# Patient Record
Sex: FEMALE | Race: WHITE | NOT HISPANIC OR LATINO
[De-identification: names, ages, dates, MRNs, and addresses within clinical notes are randomized per-mention and may not be internally consistent; named-entity substitution may affect disease eponyms.]

---

## 2018-02-24 ENCOUNTER — TRANSCRIPTION ENCOUNTER (OUTPATIENT)
Age: 58
End: 2018-02-24

## 2018-03-25 ENCOUNTER — TRANSCRIPTION ENCOUNTER (OUTPATIENT)
Age: 58
End: 2018-03-25

## 2018-04-19 ENCOUNTER — NON-APPOINTMENT (OUTPATIENT)
Age: 58
End: 2018-04-19

## 2018-04-19 ENCOUNTER — APPOINTMENT (OUTPATIENT)
Dept: INTERNAL MEDICINE | Facility: CLINIC | Age: 58
End: 2018-04-19
Payer: COMMERCIAL

## 2018-04-19 VITALS
RESPIRATION RATE: 16 BRPM | DIASTOLIC BLOOD PRESSURE: 88 MMHG | HEIGHT: 59 IN | OXYGEN SATURATION: 97 % | HEART RATE: 84 BPM | SYSTOLIC BLOOD PRESSURE: 120 MMHG | BODY MASS INDEX: 30.04 KG/M2 | WEIGHT: 149 LBS | TEMPERATURE: 98.4 F

## 2018-04-19 DIAGNOSIS — I72.8 ANEURYSM OF OTHER SPECIFIED ARTERIES: ICD-10-CM

## 2018-04-19 DIAGNOSIS — Z82.49 FAMILY HISTORY OF ISCHEMIC HEART DISEASE AND OTHER DISEASES OF THE CIRCULATORY SYSTEM: ICD-10-CM

## 2018-04-19 DIAGNOSIS — K76.0 FATTY (CHANGE OF) LIVER, NOT ELSEWHERE CLASSIFIED: ICD-10-CM

## 2018-04-19 DIAGNOSIS — Z78.9 OTHER SPECIFIED HEALTH STATUS: ICD-10-CM

## 2018-04-19 PROCEDURE — 99205 OFFICE O/P NEW HI 60 MIN: CPT | Mod: 25

## 2018-04-19 PROCEDURE — 93000 ELECTROCARDIOGRAM COMPLETE: CPT

## 2018-05-22 ENCOUNTER — LABORATORY RESULT (OUTPATIENT)
Age: 58
End: 2018-05-22

## 2018-05-25 LAB
APPEARANCE: CLEAR
BACTERIA UR CULT: NORMAL
BACTERIA: NEGATIVE
BILIRUBIN URINE: NEGATIVE
BLOOD URINE: NEGATIVE
COLOR: YELLOW
GLUCOSE QUALITATIVE U: NEGATIVE MG/DL
HYALINE CASTS: 4 /LPF
KETONES URINE: NEGATIVE
LEUKOCYTE ESTERASE URINE: ABNORMAL
MICROSCOPIC-UA: NORMAL
NITRITE URINE: NEGATIVE
PH URINE: 5
PROTEIN URINE: NEGATIVE MG/DL
RED BLOOD CELLS URINE: 2 /HPF
SPECIFIC GRAVITY URINE: 1.03
SQUAMOUS EPITHELIAL CELLS: 3 /HPF
UROBILINOGEN URINE: NEGATIVE MG/DL
WHITE BLOOD CELLS URINE: 12 /HPF

## 2018-05-31 ENCOUNTER — NON-APPOINTMENT (OUTPATIENT)
Age: 58
End: 2018-05-31

## 2018-05-31 ENCOUNTER — APPOINTMENT (OUTPATIENT)
Dept: INTERNAL MEDICINE | Facility: CLINIC | Age: 58
End: 2018-05-31
Payer: COMMERCIAL

## 2018-05-31 VITALS
RESPIRATION RATE: 16 BRPM | SYSTOLIC BLOOD PRESSURE: 130 MMHG | TEMPERATURE: 98.2 F | WEIGHT: 149 LBS | DIASTOLIC BLOOD PRESSURE: 70 MMHG | HEIGHT: 59 IN | OXYGEN SATURATION: 98 % | BODY MASS INDEX: 30.04 KG/M2 | HEART RATE: 62 BPM

## 2018-05-31 DIAGNOSIS — K21.9 GASTRO-ESOPHAGEAL REFLUX DISEASE W/OUT ESOPHAGITIS: ICD-10-CM

## 2018-05-31 PROCEDURE — 94060 EVALUATION OF WHEEZING: CPT

## 2018-05-31 PROCEDURE — 99214 OFFICE O/P EST MOD 30 MIN: CPT | Mod: 25

## 2018-05-31 PROCEDURE — 94727 GAS DIL/WSHOT DETER LNG VOL: CPT

## 2018-05-31 PROCEDURE — 94729 DIFFUSING CAPACITY: CPT

## 2018-05-31 NOTE — PHYSICAL EXAM
[No Acute Distress] : no acute distress [Well Developed] : well developed [Well-Appearing] : well-appearing [Normal Voice/Communication] : normal voice/communication [Normal Sclera/Conjunctiva] : normal sclera/conjunctiva [PERRL] : pupils equal round and reactive to light [EOMI] : extraocular movements intact [Normal Outer Ear/Nose] : the outer ears and nose were normal in appearance [Normal Oropharynx] : the oropharynx was normal [Normal Nasal Mucosa] : the nasal mucosa was normal [No JVD] : no jugular venous distention [Supple] : supple [No Lymphadenopathy] : no lymphadenopathy [Thyroid Normal, No Nodules] : the thyroid was normal and there were no nodules present [No Respiratory Distress] : no respiratory distress  [Clear to Auscultation] : lungs were clear to auscultation bilaterally [No Accessory Muscle Use] : no accessory muscle use [Normal Rate] : normal rate  [Regular Rhythm] : with a regular rhythm [Normal S1, S2] : normal S1 and S2 [No Murmur] : no murmur heard [No Varicosities] : no varicosities [No Edema] : there was no peripheral edema [No Extremity Clubbing/Cyanosis] : no extremity clubbing/cyanosis [Soft] : abdomen soft [Normal Supraclavicular Nodes] : no supraclavicular lymphadenopathy [Normal Posterior Cervical Nodes] : no posterior cervical lymphadenopathy [Normal Anterior Cervical Nodes] : no anterior cervical lymphadenopathy [No CVA Tenderness] : no CVA  tenderness [No Spinal Tenderness] : no spinal tenderness [Kyphosis] : no kyphosis [No Joint Swelling] : no joint swelling [Grossly Normal Strength/Tone] : grossly normal strength/tone [No Rash] : no rash [No Skin Lesions] : no skin lesions [Acne] : no acne [Normal Gait] : normal gait [Coordination Grossly Intact] : coordination grossly intact [No Focal Deficits] : no focal deficits [Speech Grossly Normal] : speech grossly normal [Memory Grossly Normal] : memory grossly normal [Normal Affect] : the affect was normal [Alert and Oriented x3] : oriented to person, place, and time [Normal Mood] : the mood was normal [Normal Insight/Judgement] : insight and judgment were intact [de-identified] : overweight. [de-identified] : no lip lesions [de-identified] : no wheezing or crackles. [de-identified] : round

## 2018-05-31 NOTE — DATA REVIEWED
[FreeTextEntry1] : PFT is performed today (see pre albert and post with other PFT testing). Flow rates are normal with moderate airway reactivity. ERV is reduced and RV is slightly elevated. Raw diffusion is reduced at 78% predicted but corrects to 115% based on alveolar volume.\par \par 5-22-18 labs reviewed with the patient today.\par

## 2018-05-31 NOTE — REVIEW OF SYSTEMS
[Fever] : no fever [Chills] : no chills [Fatigue] : no fatigue [Night Sweats] : no night sweats [Recent Change In Weight] : ~T recent weight change [Pain] : no pain [Vision Problems] : no vision problems [Hearing Loss] : no hearing loss [Nosebleed] : no nosebleeds [Hoarseness] : no hoarseness [Nasal Discharge] : no nasal discharge [Sore Throat] : no sore throat [Postnasal Drip] : no postnasal drip [Chest Pain] : no chest pain [Palpitations] : no palpitations [Lower Ext Edema] : no lower extremity edema [Orthopnea] : no orthopnea [Paroysmal Nocturnal Dyspnea] : no paroysmal nocturnal dyspnea [Wheezing] : no wheezing [Cough] : cough [Abdominal Pain] : no abdominal pain [Nausea] : no nausea [Constipation] : no constipation [Diarrhea] : diarrhea [Heartburn] : no heartburn [Melena] : no melena [Dysuria] : no dysuria [Hematuria] : no hematuria [Joint Pain] : no joint pain [Joint Stiffness] : no joint stiffness [Joint Swelling] : no joint swelling [Muscle Pain] : no muscle pain [Back Pain] : no back pain [Itching] : Itching [Mole Changes] : no mole changes [Skin Rash] : skin rash [Headache] : no headache [Dizziness] : no dizziness [Fainting] : no fainting [Confusion] : no confusion [Memory Loss] : no memory loss [Unsteady Walking] : no ataxia [Insomnia] : no insomnia [Anxiety] : anxiety [Depression] : no depression [Easy Bruising] : no easy bruising [Swollen Glands] : no swollen glands [Negative] : Heme/Lymph

## 2018-05-31 NOTE — PLAN
[FreeTextEntry1] : She will not return to smoking.\par Strict low fat/chol/Na diet.\par Exercise as tolerated.\par Continue weight loss.\par Continue current medications.\par She will return to Dr Hector soon.\par No acute treatment for abn UA buit she has been referred to Dr Gardner for GYN exam soon.\par Dr Pack for colonoscopy.\par Eye exam is due soon.\par She will continue yearly dental exam.\par F/U 5-6 mos as sched. or sooner PRN.\par

## 2018-05-31 NOTE — HISTORY OF PRESENT ILLNESS
[FreeTextEntry1] : HTN, urticaria and mild COPD. [de-identified] : The patient has been monitoring her BP at home and reports SBP below 140 and DBP below 80 or daily basis. She is compliant with medications and dietary restrictions. "I have lost 5 pounds so far." She denies chest pain, palpitation, LH, dizziness, edema or PND. \par \par She admits to SUTTON with moderate exertion and there is mild dry cough present during the day w/o wheeze, sputum production or hemoptysis. Rare episodes of urticaria continue with upper lip swelling, mostly in early AM, that resolves within a few hours after Zyrtec OTC. She is due f/u with Dr Hector.\par \par UA has been abn x 2 with contaminated culture. She denies sxs of UTI, hematuria, abd pain or flank pain. She is overdue GYN, GI and Opthal visits.

## 2018-06-21 ENCOUNTER — TRANSCRIPTION ENCOUNTER (OUTPATIENT)
Age: 58
End: 2018-06-21

## 2018-07-26 ENCOUNTER — APPOINTMENT (OUTPATIENT)
Dept: OBGYN | Facility: CLINIC | Age: 58
End: 2018-07-26
Payer: COMMERCIAL

## 2018-07-26 VITALS
BODY MASS INDEX: 30.44 KG/M2 | WEIGHT: 151 LBS | HEIGHT: 59 IN | DIASTOLIC BLOOD PRESSURE: 80 MMHG | SYSTOLIC BLOOD PRESSURE: 110 MMHG

## 2018-07-26 DIAGNOSIS — Z78.9 OTHER SPECIFIED HEALTH STATUS: ICD-10-CM

## 2018-07-26 PROCEDURE — 99386 PREV VISIT NEW AGE 40-64: CPT

## 2018-07-26 RX ORDER — CETIRIZINE HYDROCHLORIDE 10 MG/1
10 TABLET, FILM COATED ORAL DAILY
Qty: 30 | Refills: 5 | Status: DISCONTINUED | COMMUNITY
Start: 2018-04-19 | End: 2018-07-26

## 2018-07-26 RX ORDER — RANITIDINE 150 MG/1
150 TABLET ORAL TWICE DAILY
Qty: 180 | Refills: 3 | Status: DISCONTINUED | COMMUNITY
End: 2018-07-26

## 2018-07-30 LAB — HPV HIGH+LOW RISK DNA PNL CVX: NOT DETECTED

## 2018-07-31 LAB — CYTOLOGY CVX/VAG DOC THIN PREP: NORMAL

## 2018-09-13 LAB
24R-OH-CALCIDIOL SERPL-MCNC: 53.1 PG/ML
ALBUMIN SERPL ELPH-MCNC: 4.4 G/DL
ALP BLD-CCNC: 50 U/L
ALT SERPL-CCNC: 17 U/L
ANION GAP SERPL CALC-SCNC: 15 MMOL/L
APPEARANCE: ABNORMAL
AST SERPL-CCNC: 18 U/L
BACTERIA: NEGATIVE
BASOPHILS # BLD AUTO: 0.01 K/UL
BASOPHILS NFR BLD AUTO: 0.2 %
BILIRUB SERPL-MCNC: 0.6 MG/DL
BILIRUBIN URINE: NEGATIVE
BLOOD URINE: ABNORMAL
BUN SERPL-MCNC: 17 MG/DL
CALCIUM SERPL-MCNC: 9.5 MG/DL
CHLORIDE SERPL-SCNC: 105 MMOL/L
CHOLEST SERPL-MCNC: 157 MG/DL
CHOLEST/HDLC SERPL: 3.5 RATIO
CO2 SERPL-SCNC: 23 MMOL/L
COLOR: YELLOW
CREAT SERPL-MCNC: 0.86 MG/DL
EOSINOPHIL # BLD AUTO: 0.17 K/UL
EOSINOPHIL NFR BLD AUTO: 2.8 %
ESTIMATED AVERAGE GLUCOSE: 108 MG/DL
GLUCOSE QUALITATIVE U: NEGATIVE MG/DL
GLUCOSE SERPL-MCNC: 96 MG/DL
HBA1C MFR BLD HPLC: 5.4 %
HCT VFR BLD CALC: 40.4 %
HDLC SERPL-MCNC: 45 MG/DL
HGB BLD-MCNC: 13.6 G/DL
HYALINE CASTS: 0 /LPF
IMM GRANULOCYTES NFR BLD AUTO: 0.2 %
KETONES URINE: NEGATIVE
LDLC SERPL CALC-MCNC: 68 MG/DL
LEUKOCYTE ESTERASE URINE: ABNORMAL
LYMPHOCYTES # BLD AUTO: 1.67 K/UL
LYMPHOCYTES NFR BLD AUTO: 27.6 %
MAN DIFF?: NORMAL
MCHC RBC-ENTMCNC: 28.2 PG
MCHC RBC-ENTMCNC: 33.7 GM/DL
MCV RBC AUTO: 83.8 FL
MICROSCOPIC-UA: NORMAL
MONOCYTES # BLD AUTO: 0.46 K/UL
MONOCYTES NFR BLD AUTO: 7.6 %
NEUTROPHILS # BLD AUTO: 3.72 K/UL
NEUTROPHILS NFR BLD AUTO: 61.6 %
NITRITE URINE: NEGATIVE
PH URINE: 5
PLATELET # BLD AUTO: 218 K/UL
POTASSIUM SERPL-SCNC: 4.1 MMOL/L
PROT SERPL-MCNC: 7.4 G/DL
PROTEIN URINE: ABNORMAL MG/DL
RBC # BLD: 4.82 M/UL
RBC # FLD: 13.8 %
RED BLOOD CELLS URINE: 2 /HPF
SODIUM SERPL-SCNC: 143 MMOL/L
SPECIFIC GRAVITY URINE: 1.02
SQUAMOUS EPITHELIAL CELLS: 2 /HPF
T3FREE SERPL-MCNC: 2.88 PG/ML
T3RU NFR SERPL: 1.07 INDEX
T4 FREE SERPL-MCNC: 1.3 NG/DL
T4 SERPL-MCNC: 7.7 UG/DL
TRIGL SERPL-MCNC: 218 MG/DL
TSH SERPL-ACNC: 3.74 UIU/ML
UROBILINOGEN URINE: NEGATIVE MG/DL
WBC # FLD AUTO: 6.04 K/UL
WHITE BLOOD CELLS URINE: 201 /HPF

## 2018-10-16 ENCOUNTER — RESULT REVIEW (OUTPATIENT)
Age: 58
End: 2018-10-16

## 2019-04-23 ENCOUNTER — APPOINTMENT (OUTPATIENT)
Dept: INTERNAL MEDICINE | Facility: CLINIC | Age: 59
End: 2019-04-23
Payer: COMMERCIAL

## 2019-04-23 ENCOUNTER — MEDICATION RENEWAL (OUTPATIENT)
Age: 59
End: 2019-04-23

## 2019-04-23 VITALS
SYSTOLIC BLOOD PRESSURE: 134 MMHG | HEART RATE: 62 BPM | RESPIRATION RATE: 16 BRPM | BODY MASS INDEX: 31.45 KG/M2 | DIASTOLIC BLOOD PRESSURE: 72 MMHG | HEIGHT: 59 IN | WEIGHT: 156 LBS | OXYGEN SATURATION: 99 % | TEMPERATURE: 97.7 F

## 2019-04-23 PROCEDURE — 99396 PREV VISIT EST AGE 40-64: CPT

## 2019-04-23 NOTE — PHYSICAL EXAM
[Well Nourished] : well nourished [Well Developed] : well developed [PERRL] : pupils equal round and reactive to light [Normal Oropharynx] : the oropharynx was normal [No JVD] : no jugular venous distention [Supple] : supple [No Lymphadenopathy] : no lymphadenopathy [No Respiratory Distress] : no respiratory distress  [Clear to Auscultation] : lungs were clear to auscultation bilaterally [No Accessory Muscle Use] : no accessory muscle use [Regular Rhythm] : with a regular rhythm [No Edema] : there was no peripheral edema [No Extremity Clubbing/Cyanosis] : no extremity clubbing/cyanosis [Soft] : abdomen soft [Non Tender] : non-tender [Normal Supraclavicular Nodes] : no supraclavicular lymphadenopathy [Normal Posterior Cervical Nodes] : no posterior cervical lymphadenopathy [Normal Anterior Cervical Nodes] : no anterior cervical lymphadenopathy [No CVA Tenderness] : no CVA  tenderness [No Joint Swelling] : no joint swelling [Grossly Normal Strength/Tone] : grossly normal strength/tone [No Rash] : no rash [Normal Gait] : normal gait [Coordination Grossly Intact] : coordination grossly intact [Normal Affect] : the affect was normal [Normal Insight/Judgement] : insight and judgment were intact [de-identified] : Carotids are 1-2+ bilaterally

## 2019-04-23 NOTE — PLAN
[FreeTextEntry1] : 1. Continue with medication as outlined above.\par \par 2. The patient is due for her followup colonoscopy this year.\par \par 3. Routine blood work to be performed at this time.\par \par 4. Routine GYN followup\par \par 5. The patient will return for the new shingles vaccine after determining its coverage.\par \par 6. Followup in 6 months with full pulmonary function testing, and a Pneumovax 23. Flu shot will be offered as well.

## 2019-04-23 NOTE — HISTORY OF PRESENT ILLNESS
[de-identified] : The patient comes in today for a wellness evaluation.\par \par Overall, the patient states that she is stable at this time. She had previously been treated for chronic urticaria with Zyrtec and ranitidine. She notes that the symptoms eventually resolved. She discontinued these 2 medications on around approximately 6 months ago. She has not had any flareups of the urticaria. She still carries an EpiPen with her.\par \par The patient states that she is compliant with her other medications including fenofibrate and metoprolol. She still takes Paxil for depression and anxiety. She feels fairly well overall at this time. She occasionally exercises approximately 3 times a week but does not over exert herself. She has not been able to lose weight. She denies any change in bowel habits. She is due for GYN assessment. She now comes in for this assessment.

## 2019-04-23 NOTE — HEALTH RISK ASSESSMENT
[0] : 2) Feeling down, depressed, or hopeless: Not at all (0) [Sunscreen] : uses sunscreen [Seat Belt] :  uses seat belt [] : No [de-identified] : occasional [[id number]] : 0 [Smoke Detector] : no smoke detector [Carbon Monoxide Detector] : no carbon monoxide detector [Guns at Home] : no guns at home

## 2019-07-18 ENCOUNTER — APPOINTMENT (OUTPATIENT)
Dept: OBGYN | Facility: CLINIC | Age: 59
End: 2019-07-18
Payer: COMMERCIAL

## 2019-07-18 VITALS
BODY MASS INDEX: 31.51 KG/M2 | WEIGHT: 156.31 LBS | HEIGHT: 59 IN | SYSTOLIC BLOOD PRESSURE: 130 MMHG | DIASTOLIC BLOOD PRESSURE: 80 MMHG

## 2019-07-18 PROCEDURE — 99396 PREV VISIT EST AGE 40-64: CPT

## 2019-07-18 NOTE — HISTORY OF PRESENT ILLNESS
[Last Mammogram ___] : Last Mammogram was [unfilled] [Last Bone Density ___] : Last bone density studies [unfilled] [Last Colonoscopy ___] : Last colonoscopy [unfilled] [Good] : being in good health [Healthy Diet] : a healthy diet [Regular Exercise] : regular exercise [Last Pap ___] : Last cervical pap smear was [unfilled] [Postmenopausal] : is postmenopausal [Sexually Active] : is sexually active

## 2019-09-21 ENCOUNTER — LABORATORY RESULT (OUTPATIENT)
Age: 59
End: 2019-09-21

## 2019-09-25 LAB
25(OH)D3 SERPL-MCNC: 23.3 NG/ML
ALBUMIN SERPL ELPH-MCNC: 4.8 G/DL
ALP BLD-CCNC: 53 U/L
ALT SERPL-CCNC: 19 U/L
ANION GAP SERPL CALC-SCNC: 14 MMOL/L
APPEARANCE: ABNORMAL
AST SERPL-CCNC: 17 U/L
BACTERIA: NEGATIVE
BASOPHILS # BLD AUTO: 0.05 K/UL
BASOPHILS NFR BLD AUTO: 0.7 %
BILIRUB SERPL-MCNC: 0.5 MG/DL
BILIRUBIN URINE: NEGATIVE
BLOOD URINE: NEGATIVE
BUN SERPL-MCNC: 18 MG/DL
CALCIUM SERPL-MCNC: 9.8 MG/DL
CHLORIDE SERPL-SCNC: 106 MMOL/L
CHOLEST SERPL-MCNC: 161 MG/DL
CHOLEST/HDLC SERPL: 3.3 RATIO
CO2 SERPL-SCNC: 24 MMOL/L
COLOR: YELLOW
CREAT SERPL-MCNC: 0.79 MG/DL
EOSINOPHIL # BLD AUTO: 0.2 K/UL
EOSINOPHIL NFR BLD AUTO: 2.6 %
GLUCOSE QUALITATIVE U: NEGATIVE
GLUCOSE SERPL-MCNC: 97 MG/DL
HCT VFR BLD CALC: 45.4 %
HDLC SERPL-MCNC: 49 MG/DL
HGB BLD-MCNC: 14.8 G/DL
HYALINE CASTS: 0 /LPF
IMM GRANULOCYTES NFR BLD AUTO: 0.4 %
KETONES URINE: NEGATIVE
LDLC SERPL CALC-MCNC: 81 MG/DL
LEUKOCYTE ESTERASE URINE: ABNORMAL
LYMPHOCYTES # BLD AUTO: 2.01 K/UL
LYMPHOCYTES NFR BLD AUTO: 26.6 %
MAN DIFF?: NORMAL
MCHC RBC-ENTMCNC: 27.7 PG
MCHC RBC-ENTMCNC: 32.6 GM/DL
MCV RBC AUTO: 84.9 FL
MICROSCOPIC-UA: NORMAL
MONOCYTES # BLD AUTO: 0.49 K/UL
MONOCYTES NFR BLD AUTO: 6.5 %
NEUTROPHILS # BLD AUTO: 4.79 K/UL
NEUTROPHILS NFR BLD AUTO: 63.2 %
NITRITE URINE: NEGATIVE
PH URINE: 5.5
PLATELET # BLD AUTO: 224 K/UL
POTASSIUM SERPL-SCNC: 3.7 MMOL/L
PROT SERPL-MCNC: 7.2 G/DL
PROTEIN URINE: NORMAL
RBC # BLD: 5.35 M/UL
RBC # FLD: 13.3 %
RED BLOOD CELLS URINE: 6 /HPF
SODIUM SERPL-SCNC: 144 MMOL/L
SPECIFIC GRAVITY URINE: 1.03
SQUAMOUS EPITHELIAL CELLS: 4 /HPF
T3FREE SERPL-MCNC: 2.95 PG/ML
T3RU NFR SERPL: 1.1 TBI
T4 FREE SERPL-MCNC: 1.3 NG/DL
T4 SERPL-MCNC: 7.8 UG/DL
TRIGL SERPL-MCNC: 157 MG/DL
TSH SERPL-ACNC: 4.31 UIU/ML
URINE COMMENTS: NORMAL
UROBILINOGEN URINE: NORMAL
WBC # FLD AUTO: 7.57 K/UL
WHITE BLOOD CELLS URINE: 7 /HPF

## 2019-09-30 ENCOUNTER — APPOINTMENT (OUTPATIENT)
Dept: INTERNAL MEDICINE | Facility: CLINIC | Age: 59
End: 2019-09-30

## 2019-09-30 ENCOUNTER — APPOINTMENT (OUTPATIENT)
Dept: INTERNAL MEDICINE | Facility: CLINIC | Age: 59
End: 2019-09-30
Payer: COMMERCIAL

## 2019-09-30 ENCOUNTER — NON-APPOINTMENT (OUTPATIENT)
Age: 59
End: 2019-09-30

## 2019-09-30 VITALS
RESPIRATION RATE: 18 BRPM | HEART RATE: 71 BPM | DIASTOLIC BLOOD PRESSURE: 84 MMHG | SYSTOLIC BLOOD PRESSURE: 138 MMHG | BODY MASS INDEX: 31.45 KG/M2 | TEMPERATURE: 97.5 F | OXYGEN SATURATION: 96 % | HEIGHT: 59 IN | WEIGHT: 156 LBS

## 2019-09-30 DIAGNOSIS — K63.5 POLYP OF COLON: ICD-10-CM

## 2019-09-30 PROCEDURE — 99214 OFFICE O/P EST MOD 30 MIN: CPT | Mod: 25

## 2019-09-30 PROCEDURE — 93000 ELECTROCARDIOGRAM COMPLETE: CPT

## 2019-09-30 NOTE — HISTORY OF PRESENT ILLNESS
[FreeTextEntry1] : colonoscopy [FreeTextEntry3] : Dr. Johnna Pack [FreeTextEntry4] : Mrs. Thorpe is a 59-year-old female with a history of hypertension, hyperlipidemia and depression who is now scheduled for elective breast colonoscopy. She also has a history of colonic polyps. She has no chest pain, shortness breath or palpitations. Her no nocturnal symptoms of cough or dyspnea.

## 2019-09-30 NOTE — ASSESSMENT
[Patient Optimized for Surgery] : Patient optimized for surgery [No Further Testing Recommended] : no further testing recommended [As per surgery] : as per surgery [FreeTextEntry4] : Ms. Thorpe is scheduled for elective colonoscopy. She has a history of colon polyps. There is no contraindication to planned procedure with sedation/anesthesia. Followup as scheduled.

## 2019-09-30 NOTE — PHYSICAL EXAM
[No Acute Distress] : no acute distress [Well Nourished] : well nourished [Well Developed] : well developed [Well-Appearing] : well-appearing [Normal Sclera/Conjunctiva] : normal sclera/conjunctiva [PERRL] : pupils equal round and reactive to light [Normal Outer Ear/Nose] : the outer ears and nose were normal in appearance [EOMI] : extraocular movements intact [No JVD] : no jugular venous distention [Normal Oropharynx] : the oropharynx was normal [No Lymphadenopathy] : no lymphadenopathy [Supple] : supple [Thyroid Normal, No Nodules] : the thyroid was normal and there were no nodules present [No Respiratory Distress] : no respiratory distress  [No Accessory Muscle Use] : no accessory muscle use [Normal Rate] : normal rate  [Clear to Auscultation] : lungs were clear to auscultation bilaterally [Normal S1, S2] : normal S1 and S2 [Regular Rhythm] : with a regular rhythm [No Carotid Bruits] : no carotid bruits [No Murmur] : no murmur heard [No Abdominal Bruit] : a ~M bruit was not heard ~T in the abdomen [No Varicosities] : no varicosities [No Edema] : there was no peripheral edema [Pedal Pulses Present] : the pedal pulses are present [No Extremity Clubbing/Cyanosis] : no extremity clubbing/cyanosis [No Palpable Aorta] : no palpable aorta [Soft] : abdomen soft [Non Tender] : non-tender [Non-distended] : non-distended [No HSM] : no HSM [No Masses] : no abdominal mass palpated [Normal Posterior Cervical Nodes] : no posterior cervical lymphadenopathy [Normal Bowel Sounds] : normal bowel sounds [No CVA Tenderness] : no CVA  tenderness [Normal Anterior Cervical Nodes] : no anterior cervical lymphadenopathy [No Spinal Tenderness] : no spinal tenderness [Grossly Normal Strength/Tone] : grossly normal strength/tone [No Rash] : no rash [No Joint Swelling] : no joint swelling [No Focal Deficits] : no focal deficits [Coordination Grossly Intact] : coordination grossly intact [Normal Gait] : normal gait [Deep Tendon Reflexes (DTR)] : deep tendon reflexes were 2+ and symmetric [Normal Insight/Judgement] : insight and judgment were intact [Normal Affect] : the affect was normal

## 2019-11-26 ENCOUNTER — APPOINTMENT (OUTPATIENT)
Dept: INTERNAL MEDICINE | Facility: CLINIC | Age: 59
End: 2019-11-26

## 2019-12-09 ENCOUNTER — APPOINTMENT (OUTPATIENT)
Dept: INTERNAL MEDICINE | Facility: CLINIC | Age: 59
End: 2019-12-09

## 2019-12-14 LAB
APPEARANCE: ABNORMAL
BACTERIA UR CULT: NORMAL
BACTERIA: NEGATIVE
BILIRUBIN URINE: NEGATIVE
BLOOD URINE: NEGATIVE
CALCIUM OXALATE CRYSTALS: ABNORMAL
COLOR: YELLOW
GLUCOSE QUALITATIVE U: NEGATIVE
HYALINE CASTS: 0 /LPF
KETONES URINE: NEGATIVE
LEUKOCYTE ESTERASE URINE: ABNORMAL
MICROSCOPIC-UA: NORMAL
NITRITE URINE: NEGATIVE
PH URINE: 5.5
PROTEIN URINE: NORMAL
RED BLOOD CELLS URINE: 0 /HPF
SPECIFIC GRAVITY URINE: 1.02
SQUAMOUS EPITHELIAL CELLS: 3 /HPF
URINE COMMENTS: NORMAL
UROBILINOGEN URINE: NORMAL
WHITE BLOOD CELLS URINE: 6 /HPF

## 2019-12-16 ENCOUNTER — APPOINTMENT (OUTPATIENT)
Dept: INTERNAL MEDICINE | Facility: CLINIC | Age: 59
End: 2019-12-16
Payer: COMMERCIAL

## 2019-12-16 VITALS
TEMPERATURE: 98.4 F | DIASTOLIC BLOOD PRESSURE: 82 MMHG | BODY MASS INDEX: 31.04 KG/M2 | OXYGEN SATURATION: 98 % | RESPIRATION RATE: 16 BRPM | SYSTOLIC BLOOD PRESSURE: 120 MMHG | WEIGHT: 154 LBS | HEIGHT: 59 IN | HEART RATE: 68 BPM

## 2019-12-16 DIAGNOSIS — M54.5 LOW BACK PAIN: ICD-10-CM

## 2019-12-16 DIAGNOSIS — E88.81 METABOLIC SYNDROME: ICD-10-CM

## 2019-12-16 PROCEDURE — 99214 OFFICE O/P EST MOD 30 MIN: CPT

## 2019-12-16 NOTE — ASSESSMENT
[FreeTextEntry1] : \par Discussed current recommendations regarding exercise for general health and well being and for prevention/progression of chronic disease with goal of 30 minutes of moderate intensity activity most days of week.  For weight lose may require 60 - 90 minutes of moderate intensity exercise.\par \par Healthy diet\par \par Avoid heavy lifting.  Mobic 15 mg qd x 7-10 days.  Weight loss.  Do Xray LS spine.  FU if pain persists\par \par Repeat Thyroid Bw in 3 months.  \par \par Vit D 2000ius qd. \par \par Declined vaccines. \par \par FU with  in 6 months for CPE.

## 2019-12-16 NOTE — PHYSICAL EXAM
[No Edema] : there was no peripheral edema [No Extremity Clubbing/Cyanosis] : no extremity clubbing/cyanosis [Normal] : no CVA or spinal tenderness [de-identified] : + R lumbar paravertebral tenderness

## 2019-12-16 NOTE — COUNSELING
[Potential consequences of obesity discussed] : Potential consequences of obesity discussed [Encouraged to increase physical activity] : Encouraged to increase physical activity [Benefits of weight loss discussed] : Benefits of weight loss discussed [Good understanding] : Patient has a good understanding of lifestyle changes and steps needed to achieve self management goal

## 2019-12-16 NOTE — HISTORY OF PRESENT ILLNESS
[FreeTextEntry1] : FU HTN/review BW [de-identified] : Overall she is feeling well  although for the past few months having L sided back pain.   Worse getting out of bed in am, improves after 10 minutes of motion.  There is no radiation.    No leg weakness or numbness.  Occasional takes aspirin for pain. Exercises doing aerobic classes 2-3 x/week without difficulty.   No trauma. \par   Recently she had colonoscopy by .  +polyp with repeat in 3 years.

## 2020-05-01 ENCOUNTER — EMERGENCY (EMERGENCY)
Facility: HOSPITAL | Age: 60
LOS: 0 days | Discharge: HOME | End: 2020-05-01
Payer: COMMERCIAL

## 2020-05-01 VITALS
HEART RATE: 64 BPM | RESPIRATION RATE: 17 BRPM | OXYGEN SATURATION: 97 % | DIASTOLIC BLOOD PRESSURE: 90 MMHG | SYSTOLIC BLOOD PRESSURE: 159 MMHG | TEMPERATURE: 98 F

## 2020-05-01 DIAGNOSIS — R53.83 OTHER FATIGUE: ICD-10-CM

## 2020-05-01 DIAGNOSIS — I10 ESSENTIAL (PRIMARY) HYPERTENSION: ICD-10-CM

## 2020-05-01 DIAGNOSIS — R50.9 FEVER, UNSPECIFIED: ICD-10-CM

## 2020-05-01 DIAGNOSIS — Z20.828 CONTACT WITH AND (SUSPECTED) EXPOSURE TO OTHER VIRAL COMMUNICABLE DISEASES: ICD-10-CM

## 2020-05-01 DIAGNOSIS — J02.9 ACUTE PHARYNGITIS, UNSPECIFIED: ICD-10-CM

## 2020-05-01 PROCEDURE — 87635 SARS-COV-2 COVID-19 AMP PRB: CPT

## 2020-05-01 PROCEDURE — 99283 EMERGENCY DEPT VISIT LOW MDM: CPT

## 2020-05-01 NOTE — ED STATDOCS - CARE PLAN
Principal Discharge DX:	Fatigue, unspecified type  Secondary Diagnosis:	Fever, unspecified fever cause

## 2020-05-01 NOTE — ED STATDOCS - OBJECTIVE STATEMENT
60 yo female with a PMH of htn presents with sore throat, fatigue, and fever T max 102F x 2 days. +exposure to covid in her house. Pt here for testing because she has a  grandchild.

## 2020-05-01 NOTE — ED STATDOCS - CLINICAL SUMMARY MEDICAL DECISION MAKING FREE TEXT BOX
patient presents with fatigue, sore throat, fever and concern for COVID exposure.  As patient is nontoxic appearing will test for COVID and d/c.  Quarantine reviewed and return precautions reviewed.

## 2020-05-01 NOTE — ED ADULT NURSE NOTE - OBJECTIVE STATEMENT
Patient provides verbal consent to receive results via text or email. Discharge instructions given verbally and understood by patient. Self-quarantine and COVID-19 information provided to patient. Unable to sign secondary to COVID-19 PUI. Patient presents for COVID-19 testing; complains of cold symptoms.

## 2020-05-02 LAB — SARS-COV-2 RNA SPEC QL NAA+PROBE: SIGNIFICANT CHANGE UP

## 2020-06-30 ENCOUNTER — APPOINTMENT (OUTPATIENT)
Dept: INTERNAL MEDICINE | Facility: CLINIC | Age: 60
End: 2020-06-30

## 2020-06-30 LAB
THYROGLOB AB SERPL-ACNC: <20 IU/ML
THYROPEROXIDASE AB SERPL IA-ACNC: <10 IU/ML
TSH SERPL-ACNC: 1 UIU/ML

## 2020-07-01 ENCOUNTER — TRANSCRIPTION ENCOUNTER (OUTPATIENT)
Age: 60
End: 2020-07-01

## 2020-07-08 ENCOUNTER — APPOINTMENT (OUTPATIENT)
Dept: INTERNAL MEDICINE | Facility: CLINIC | Age: 60
End: 2020-07-08
Payer: COMMERCIAL

## 2020-07-08 VITALS — DIASTOLIC BLOOD PRESSURE: 86 MMHG | SYSTOLIC BLOOD PRESSURE: 138 MMHG

## 2020-07-08 VITALS
TEMPERATURE: 98.3 F | HEART RATE: 63 BPM | HEIGHT: 59 IN | WEIGHT: 153 LBS | RESPIRATION RATE: 18 BRPM | SYSTOLIC BLOOD PRESSURE: 168 MMHG | BODY MASS INDEX: 30.84 KG/M2 | OXYGEN SATURATION: 99 % | DIASTOLIC BLOOD PRESSURE: 94 MMHG

## 2020-07-08 PROCEDURE — 99396 PREV VISIT EST AGE 40-64: CPT

## 2020-07-08 RX ORDER — MELOXICAM 15 MG/1
15 TABLET ORAL
Qty: 14 | Refills: 0 | Status: DISCONTINUED | COMMUNITY
Start: 2019-12-16 | End: 2020-07-08

## 2020-07-08 NOTE — HEALTH RISK ASSESSMENT
[Yes] : Yes [Monthly or less (1 pt)] : Monthly or less (1 point) [1 or 2 (0 pts)] : 1 or 2 (0 points) [Never (0 pts)] : Never (0 points) [No] : In the past 12 months have you used drugs other than those required for medical reasons? 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

## 2020-07-08 NOTE — COUNSELING
[Potential consequences of obesity discussed] : Potential consequences of obesity discussed [Benefits of weight loss discussed] : Benefits of weight loss discussed [Encouraged to increase physical activity] : Encouraged to increase physical activity [Decrease Portions] : decrease portions [____ min/wk Activity] : [unfilled] min/wk activity [Good understanding] : Patient has a good understanding of lifestyle changes and steps needed to achieve self management goal [None] : None

## 2020-07-08 NOTE — HISTORY OF PRESENT ILLNESS
[FreeTextEntry1] : CPE [de-identified] : She is doing well overall.  Exercises doing aerobics and dancing 3x/week and walks occassionally.    Wants to lose weight.  Her downfalls are bread and pasta.   does most of cooking so unsure of Salt intake but attempts to avoid.  \par Doing well on Paxil. \par She has not had any issues regarding allergic Rxs  in the past year.

## 2020-07-08 NOTE — ASSESSMENT
[FreeTextEntry1] : Dental exam q 6 months\par Colonoscopy UTD\par GYN yearly for pap/mammo/breast exam\par Yearly eye exam.\par Long discussion focusing on benefits of  Lifestyle modifications.  Encouraged healthy diet with lean meats, fish, chicken,increased fruits and veggies, higher fiber and avoidance of sweets, soft drinks and fruit juices.   Discussed  portion control  and importance of  increased exercise for health promotion and disease prevention. Calorie restriction with 500 -1000 marilyn necessary to promote weight loss of 1-2 lbs weekly.\par \par Limit white starches and low salt\par Stressed importance off an overall healthy diet and lifestyle\par \par Declines Pneumovax and Shingrix\par Yearly flu vaccine advised\par \par FU 6 months.  Do FBW prior to visit.  \par \par \par

## 2020-07-08 NOTE — PHYSICAL EXAM
[No Acute Distress] : no acute distress [Well Developed] : well developed [Well Nourished] : well nourished [Well-Appearing] : well-appearing [PERRL] : pupils equal round and reactive to light [Normal Sclera/Conjunctiva] : normal sclera/conjunctiva [EOMI] : extraocular movements intact [Normal Outer Ear/Nose] : the outer ears and nose were normal in appearance [Normal Oropharynx] : the oropharynx was normal [No JVD] : no jugular venous distention [No Lymphadenopathy] : no lymphadenopathy [Thyroid Normal, No Nodules] : the thyroid was normal and there were no nodules present [Supple] : supple [No Respiratory Distress] : no respiratory distress  [No Accessory Muscle Use] : no accessory muscle use [Clear to Auscultation] : lungs were clear to auscultation bilaterally [Normal Rate] : normal rate  [Regular Rhythm] : with a regular rhythm [Normal S1, S2] : normal S1 and S2 [No Carotid Bruits] : no carotid bruits [No Murmur] : no murmur heard [No Abdominal Bruit] : a ~M bruit was not heard ~T in the abdomen [No Varicosities] : no varicosities [No Edema] : there was no peripheral edema [Pedal Pulses Present] : the pedal pulses are present [No Palpable Aorta] : no palpable aorta [No Extremity Clubbing/Cyanosis] : no extremity clubbing/cyanosis [Soft] : abdomen soft [Non-distended] : non-distended [Non Tender] : non-tender [No HSM] : no HSM [No Masses] : no abdominal mass palpated [Normal Bowel Sounds] : normal bowel sounds [Normal Anterior Cervical Nodes] : no anterior cervical lymphadenopathy [Normal Posterior Cervical Nodes] : no posterior cervical lymphadenopathy [No CVA Tenderness] : no CVA  tenderness [No Spinal Tenderness] : no spinal tenderness [Grossly Normal Strength/Tone] : grossly normal strength/tone [No Joint Swelling] : no joint swelling [No Rash] : no rash [Coordination Grossly Intact] : coordination grossly intact [No Focal Deficits] : no focal deficits [Normal Gait] : normal gait [Normal Affect] : the affect was normal [Normal Insight/Judgement] : insight and judgment were intact [Deep Tendon Reflexes (DTR)] : deep tendon reflexes were 2+ and symmetric [de-identified] : midline sx scar

## 2021-02-01 ENCOUNTER — RX RENEWAL (OUTPATIENT)
Age: 61
End: 2021-02-01

## 2021-02-02 ENCOUNTER — LABORATORY RESULT (OUTPATIENT)
Age: 61
End: 2021-02-02

## 2021-02-04 LAB
25(OH)D3 SERPL-MCNC: 27 NG/ML
ALBUMIN SERPL ELPH-MCNC: 4.5 G/DL
ALP BLD-CCNC: 61 U/L
ALT SERPL-CCNC: 26 U/L
ANION GAP SERPL CALC-SCNC: 13 MMOL/L
APPEARANCE: ABNORMAL
AST SERPL-CCNC: 20 U/L
BACTERIA: NEGATIVE
BASOPHILS # BLD AUTO: 0.04 K/UL
BASOPHILS NFR BLD AUTO: 0.5 %
BILIRUB SERPL-MCNC: 0.6 MG/DL
BILIRUBIN URINE: NEGATIVE
BLOOD URINE: NEGATIVE
BUN SERPL-MCNC: 15 MG/DL
CALCIUM SERPL-MCNC: 9.7 MG/DL
CHLORIDE SERPL-SCNC: 104 MMOL/L
CHOLEST SERPL-MCNC: 180 MG/DL
CO2 SERPL-SCNC: 25 MMOL/L
COLOR: ABNORMAL
CREAT SERPL-MCNC: 0.95 MG/DL
EOSINOPHIL # BLD AUTO: 0.26 K/UL
EOSINOPHIL NFR BLD AUTO: 3.5 %
ESTIMATED AVERAGE GLUCOSE: 103 MG/DL
GLUCOSE QUALITATIVE U: NEGATIVE
GLUCOSE SERPL-MCNC: 93 MG/DL
HBA1C MFR BLD HPLC: 5.2 %
HCT VFR BLD CALC: 46.5 %
HDLC SERPL-MCNC: 46 MG/DL
HGB BLD-MCNC: 15.2 G/DL
HYALINE CASTS: 0 /LPF
IMM GRANULOCYTES NFR BLD AUTO: 0.4 %
KETONES URINE: NEGATIVE
LDLC SERPL CALC-MCNC: 94 MG/DL
LEUKOCYTE ESTERASE URINE: ABNORMAL
LYMPHOCYTES # BLD AUTO: 1.65 K/UL
LYMPHOCYTES NFR BLD AUTO: 22.1 %
MAN DIFF?: NORMAL
MCHC RBC-ENTMCNC: 27.9 PG
MCHC RBC-ENTMCNC: 32.7 GM/DL
MCV RBC AUTO: 85.3 FL
MICROSCOPIC-UA: NORMAL
MONOCYTES # BLD AUTO: 0.53 K/UL
MONOCYTES NFR BLD AUTO: 7.1 %
NEUTROPHILS # BLD AUTO: 4.94 K/UL
NEUTROPHILS NFR BLD AUTO: 66.4 %
NITRITE URINE: NEGATIVE
NONHDLC SERPL-MCNC: 134 MG/DL
PH URINE: 5.5
PLATELET # BLD AUTO: 249 K/UL
POTASSIUM SERPL-SCNC: 4.8 MMOL/L
PROT SERPL-MCNC: 6.9 G/DL
PROTEIN URINE: ABNORMAL
RBC # BLD: 5.45 M/UL
RBC # FLD: 13.5 %
RED BLOOD CELLS URINE: 2 /HPF
SODIUM SERPL-SCNC: 142 MMOL/L
SPECIFIC GRAVITY URINE: 1.03
SQUAMOUS EPITHELIAL CELLS: 0 /HPF
T3FREE SERPL-MCNC: 2.8 PG/ML
T3RU NFR SERPL: 1.2 TBI
T4 FREE SERPL-MCNC: 1.2 NG/DL
T4 SERPL-MCNC: 8.2 UG/DL
TRIGL SERPL-MCNC: 200 MG/DL
TSH SERPL-ACNC: 2.22 UIU/ML
URINE COMMENTS: NORMAL
UROBILINOGEN URINE: NORMAL
WBC # FLD AUTO: 7.45 K/UL
WHITE BLOOD CELLS URINE: 58 /HPF

## 2021-02-11 LAB
APPEARANCE: CLEAR
BACTERIA UR CULT: NORMAL
BACTERIA: NEGATIVE
BILIRUBIN URINE: NEGATIVE
BLOOD URINE: NEGATIVE
COLOR: YELLOW
GLUCOSE QUALITATIVE U: NEGATIVE
HYALINE CASTS: 1 /LPF
KETONES URINE: NEGATIVE
LEUKOCYTE ESTERASE URINE: ABNORMAL
MICROSCOPIC-UA: NORMAL
NITRITE URINE: NEGATIVE
PH URINE: 5.5
PROTEIN URINE: NEGATIVE
RED BLOOD CELLS URINE: 1 /HPF
SPECIFIC GRAVITY URINE: 1.03
SQUAMOUS EPITHELIAL CELLS: 4 /HPF
UROBILINOGEN URINE: NORMAL
WHITE BLOOD CELLS URINE: 24 /HPF

## 2021-02-16 ENCOUNTER — NON-APPOINTMENT (OUTPATIENT)
Age: 61
End: 2021-02-16

## 2021-02-16 ENCOUNTER — APPOINTMENT (OUTPATIENT)
Dept: INTERNAL MEDICINE | Facility: CLINIC | Age: 61
End: 2021-02-16
Payer: COMMERCIAL

## 2021-02-16 VITALS
TEMPERATURE: 98.3 F | WEIGHT: 155 LBS | SYSTOLIC BLOOD PRESSURE: 162 MMHG | DIASTOLIC BLOOD PRESSURE: 84 MMHG | BODY MASS INDEX: 31.25 KG/M2 | HEART RATE: 77 BPM | HEIGHT: 59 IN | OXYGEN SATURATION: 97 % | RESPIRATION RATE: 18 BRPM

## 2021-02-16 DIAGNOSIS — E55.9 VITAMIN D DEFICIENCY, UNSPECIFIED: ICD-10-CM

## 2021-02-16 PROCEDURE — 99072 ADDL SUPL MATRL&STAF TM PHE: CPT

## 2021-02-16 PROCEDURE — 93000 ELECTROCARDIOGRAM COMPLETE: CPT

## 2021-02-16 PROCEDURE — 99396 PREV VISIT EST AGE 40-64: CPT | Mod: 25

## 2021-02-16 PROCEDURE — 99214 OFFICE O/P EST MOD 30 MIN: CPT | Mod: 25

## 2021-02-16 NOTE — DATA REVIEWED
[FreeTextEntry1] : Yearly routine blood work is reviewed\par \par EKG shows sinus rhythm at a rate of 75. There are normal intervals and axis. There are no acute ST-T wave changes noted.

## 2021-02-16 NOTE — PHYSICAL EXAM
[Well Nourished] : well nourished [Well Developed] : well developed [Normal Oropharynx] : the oropharynx was normal [No JVD] : no jugular venous distention [Supple] : supple [No Respiratory Distress] : no respiratory distress  [Clear to Auscultation] : lungs were clear to auscultation bilaterally [No Accessory Muscle Use] : no accessory muscle use [Regular Rhythm] : with a regular rhythm [Normal S1, S2] : normal S1 and S2 [No Edema] : there was no peripheral edema [No Extremity Clubbing/Cyanosis] : no extremity clubbing/cyanosis [Soft] : abdomen soft [Non Tender] : non-tender [Normal Supraclavicular Nodes] : no supraclavicular lymphadenopathy [Normal Posterior Cervical Nodes] : no posterior cervical lymphadenopathy [Normal Anterior Cervical Nodes] : no anterior cervical lymphadenopathy [No CVA Tenderness] : no CVA  tenderness [No Rash] : no rash [Normal Affect] : the affect was normal [Normal Insight/Judgement] : insight and judgment were intact [de-identified] : mild centripetal obesity

## 2021-02-16 NOTE — HISTORY OF PRESENT ILLNESS
[FreeTextEntry1] : The patient comes in today for a routine followup evaluation.\par  [de-identified] : The patient states, that she is relatively stable at this time. Unfortunately, she has not been able to lose weight. She has had poor dietary choices over the past several years. During a recent rotavirus epidemic, she has not been exercising as much as she had in the past. She states that she is compliant with her metoprolol taking it once a day. She usually takes the medication in the evening.\par \par The patient remains on fenofibrate for her hypertriglyceridemia. She did undergo recent blood work and her cholesterol increased from 157 up to 180. She continues to take her Paxil for underlying depression. She notes that her mood has been stable. She now comes in for this assessment

## 2021-02-16 NOTE — PLAN
[FreeTextEntry1] : 1. We have had an extensive discussion regarding her accelerated hypertension. To this end, the patient knows that she has not been compliant with dietary discretion. She will now tend to be more compliant with salt intake as well as fast foods. She will attempt to make better and more healthy choices going forward.\par \par 2. Will now increase the strength of the metoprolol from 25 mg daily, to 50 mg daily. We will need to monitor her pulse and blood pressure carefully.\par \par 3. Diet and weight loss have been stressed.\par \par 4. The patient will resume cardiovascular exercise\par \par 5. Increase vitamin D to 5000 units daily\par \par 6. The patient is in need of routine GYN followup with mammography\par \par 7. The patient will followup in 6 weeks with Ranulfo Hernan for a blood pressure check. This will be to assess her response to the above noted changes\par \par 8. Follow up with myself in 6 months with a wellness evaluation.

## 2021-03-25 ENCOUNTER — APPOINTMENT (OUTPATIENT)
Dept: OBGYN | Facility: CLINIC | Age: 61
End: 2021-03-25
Payer: COMMERCIAL

## 2021-03-25 VITALS
BODY MASS INDEX: 30.64 KG/M2 | RESPIRATION RATE: 16 BRPM | HEART RATE: 78 BPM | WEIGHT: 152 LBS | TEMPERATURE: 98 F | DIASTOLIC BLOOD PRESSURE: 80 MMHG | SYSTOLIC BLOOD PRESSURE: 148 MMHG | HEIGHT: 59 IN

## 2021-03-25 PROCEDURE — 99072 ADDL SUPL MATRL&STAF TM PHE: CPT

## 2021-03-25 PROCEDURE — 99396 PREV VISIT EST AGE 40-64: CPT

## 2021-03-25 NOTE — HISTORY OF PRESENT ILLNESS
[No] : Patient does not have concerns regarding sex [Currently Active] : currently active [Men] : men [Vaginal] : vaginal [TextBox_4] : No vb takes vit d, exercising [Mammogramdate] : 9/19 [PapSmeardate] : 7/18 [BoneDensityDate] : 8/31/19 [TextBox_37] : fu 2-3 yrs [ColonoscopyDate] : utd

## 2021-03-25 NOTE — PHYSICAL EXAM
[Appropriately responsive] : appropriately responsive [Alert] : alert [No Acute Distress] : no acute distress [Soft] : soft [Non-tender] : non-tender [Non-distended] : non-distended [No HSM] : No HSM [No Lesions] : no lesions [No Mass] : no mass [Oriented x3] : oriented x3 [Examination Of The Breasts] : a normal appearance [No Masses] : no breast masses were palpable [Labia Majora] : normal [Labia Minora] : normal [Normal] : normal [Tenderness] : nontender [Enlarged ___ wks] : not enlarged [Mass ___ cm] : no uterine mass was palpated [Uterine Adnexae] : non-palpable [No Tenderness] : no tenderness [FreeTextEntry5] : pap done [FreeTextEntry9] : guaiac- Sarecycline Pregnancy And Lactation Text: This medication is Pregnancy Category D and not consider safe during pregnancy. It is also excreted in breast milk.

## 2021-03-27 LAB — HPV HIGH+LOW RISK DNA PNL CVX: NOT DETECTED

## 2021-03-31 ENCOUNTER — APPOINTMENT (OUTPATIENT)
Dept: INTERNAL MEDICINE | Facility: CLINIC | Age: 61
End: 2021-03-31
Payer: COMMERCIAL

## 2021-03-31 VITALS
DIASTOLIC BLOOD PRESSURE: 84 MMHG | TEMPERATURE: 98 F | HEART RATE: 56 BPM | OXYGEN SATURATION: 97 % | HEIGHT: 59 IN | WEIGHT: 150 LBS | RESPIRATION RATE: 18 BRPM | SYSTOLIC BLOOD PRESSURE: 126 MMHG | BODY MASS INDEX: 30.24 KG/M2

## 2021-03-31 PROCEDURE — 99213 OFFICE O/P EST LOW 20 MIN: CPT

## 2021-03-31 PROCEDURE — 99072 ADDL SUPL MATRL&STAF TM PHE: CPT

## 2021-03-31 NOTE — REVIEW OF SYSTEMS
Patient has small scabbed areas on left groin abdomen area. Visited family doctor and was started on keflex 500mg qid. Has been taking RX since Monday the 10th.   [Negative] : Heme/Lymph

## 2021-03-31 NOTE — HISTORY OF PRESENT ILLNESS
[FreeTextEntry1] : FU HTN [de-identified] : Metoprolol increased to 50 mg last visit.  She has also altered her diet and is eating healthier with portion control.  She has lost 5 lbs and feels well.

## 2021-07-12 ENCOUNTER — OUTPATIENT (OUTPATIENT)
Dept: OUTPATIENT SERVICES | Facility: HOSPITAL | Age: 61
LOS: 1 days | End: 2021-07-12
Payer: COMMERCIAL

## 2021-07-12 ENCOUNTER — APPOINTMENT (OUTPATIENT)
Dept: CT IMAGING | Facility: CLINIC | Age: 61
End: 2021-07-12
Payer: COMMERCIAL

## 2021-07-12 ENCOUNTER — RESULT REVIEW (OUTPATIENT)
Age: 61
End: 2021-07-12

## 2021-07-12 ENCOUNTER — APPOINTMENT (OUTPATIENT)
Dept: INTERNAL MEDICINE | Facility: CLINIC | Age: 61
End: 2021-07-12
Payer: COMMERCIAL

## 2021-07-12 VITALS
WEIGHT: 146 LBS | HEIGHT: 59 IN | TEMPERATURE: 98.7 F | HEART RATE: 64 BPM | DIASTOLIC BLOOD PRESSURE: 88 MMHG | OXYGEN SATURATION: 96 % | SYSTOLIC BLOOD PRESSURE: 130 MMHG | BODY MASS INDEX: 29.43 KG/M2 | RESPIRATION RATE: 16 BRPM

## 2021-07-12 DIAGNOSIS — R19.7 DIARRHEA, UNSPECIFIED: ICD-10-CM

## 2021-07-12 DIAGNOSIS — R10.9 UNSPECIFIED ABDOMINAL PAIN: ICD-10-CM

## 2021-07-12 PROCEDURE — 74177 CT ABD & PELVIS W/CONTRAST: CPT | Mod: 26

## 2021-07-12 PROCEDURE — 74177 CT ABD & PELVIS W/CONTRAST: CPT

## 2021-07-12 PROCEDURE — 36415 COLL VENOUS BLD VENIPUNCTURE: CPT

## 2021-07-12 PROCEDURE — 99213 OFFICE O/P EST LOW 20 MIN: CPT | Mod: 25

## 2021-07-12 PROCEDURE — 99072 ADDL SUPL MATRL&STAF TM PHE: CPT

## 2021-07-12 PROCEDURE — 82565 ASSAY OF CREATININE: CPT

## 2021-07-12 NOTE — PHYSICAL EXAM
[No Acute Distress] : no acute distress [Well-Appearing] : well-appearing [Normal Sclera/Conjunctiva] : normal sclera/conjunctiva [PERRL] : pupils equal round and reactive to light [EOMI] : extraocular movements intact [Normal Outer Ear/Nose] : the outer ears and nose were normal in appearance [Normal Oropharynx] : the oropharynx was normal [Supple] : supple [No Respiratory Distress] : no respiratory distress  [Clear to Auscultation] : lungs were clear to auscultation bilaterally [Normal Rate] : normal rate  [Regular Rhythm] : with a regular rhythm [Normal S1, S2] : normal S1 and S2 [No Murmur] : no murmur heard [Pedal Pulses Present] : the pedal pulses are present [No Edema] : there was no peripheral edema [Soft] : abdomen soft [No Masses] : no abdominal mass palpated [No HSM] : no HSM [Normal Supraclavicular Nodes] : no supraclavicular lymphadenopathy [Normal Posterior Cervical Nodes] : no posterior cervical lymphadenopathy [Normal Anterior Cervical Nodes] : no anterior cervical lymphadenopathy [No CVA Tenderness] : no CVA  tenderness [No Spinal Tenderness] : no spinal tenderness [No Joint Swelling] : no joint swelling [No Rash] : no rash [No Focal Deficits] : no focal deficits [Normal Gait] : normal gait [Normal Affect] : the affect was normal [Alert and Oriented x3] : oriented to person, place, and time [de-identified] : mild diffuse discomfort with mild palpation at right mid abdomen, no rebound or guarding; negative Harp's sign

## 2021-07-12 NOTE — ASSESSMENT
[FreeTextEntry1] : \par 61 yo F pmhx HTN, HLD, COPD, GERD, hepatic steatosis, colon polyps, splenic stent for hx aneurysm, depression here for acute visit\par \par abdominal pain, diarrhea, hx GERD- onset after eating store bought cherries, possible GE.  Focal right mid abdominal pain on exam w/o acute findings.  VSS, afebrile and non-toxic appearing.\par -no clear indication for abx at present\par -check cbc/cmp, stool cx\par -check CT scan r/o colitis\par -advised bland diet, increased hydration\par -declines PPI trial\par -GI f/u pending possibly 7/14/21, encouraged\par -advised prompt medical eval if sx's worsen or f/c, n/v, etc.\par -pt declines f/u appt\par \par MISC:  Continued social distancing and measure for covid19 prevention encouraged.  \par -hx vaccine pfizer, last dose 4/21\par \par Pt has prior scheduled office appt 8/24/21 with PMD Dr. Rosales for CPE.  Advised to f/u sooner as needed.\par \par Labs drawn in office today.\par \par

## 2021-07-12 NOTE — HISTORY OF PRESENT ILLNESS
[FreeTextEntry8] : \par 61 yo F pmhx HTN, HLD, COPD, GERD, hepatic steatosis, depression here for acute visit\par \par Last seen in office by NP 3/21 for f/u.\par \par c/o abdominal pain on/off x 3 days\par described as gen vague "burning" pain.  Notes may also happen w/o food relation, but onset after eating at times- no clear relation to type of food eating.  Recalls onset of pain after had eaten store bought cherries, states no others in home ate same.\par taking Tums w/o help; no OTC pain meds tried\par +diarrhea x 3d, going 2-3x/d, brown, watery; no blood or mucus\par Denies n/v.  Reports nl appetite and po intake.\par \par hx GERD- feels is not active\par -denies recent spicy foods, citric foods or NSAIDs\par \par Denies hx similar abdominal pain.\par Denies recent travel.  Last received abx many yrs ago.  \par works in [employer])- last at work 2 weeks ago, no known illness there\par intentionally trying to lose wt with healthy diet, no recent change in diet.\par \par Reports is socially distancing and using precautions for covid prevention.\par Denies sick or covid positive contacts.\par Denies fever, chills, cough or sob.\par -hx vaccine pfizer, last dose 4/21\par \par hx stent in spleen (retained) for aneurysm- monitored by GI\par -followed by GI, Dr. Fall- has appt pending, possible Wednesday when called today re: abdominal pain\par -reports hx colonoscopy last visit ~ 6 mo ago, told + benign polyp (hx similar) and to repeat in 4 - 5 yrs\par \par \par Denies  complaints.\par  How Severe Is Your Acne?: moderate Is This A New Presentation, Or A Follow-Up?: Follow Up Acne Additional Comments (Use Complete Sentences): Patient states she used Doxycycline 100mg QD, without improvement on acne, also patient has tried Proactive and OTC topical med without improvement

## 2021-07-13 ENCOUNTER — NON-APPOINTMENT (OUTPATIENT)
Age: 61
End: 2021-07-13

## 2021-07-13 LAB
ALBUMIN SERPL ELPH-MCNC: 4.3 G/DL
ALP BLD-CCNC: 49 U/L
ALT SERPL-CCNC: 8 U/L
ANION GAP SERPL CALC-SCNC: 9 MMOL/L
AST SERPL-CCNC: 13 U/L
BASOPHILS # BLD AUTO: 0.03 K/UL
BASOPHILS NFR BLD AUTO: 0.4 %
BILIRUB SERPL-MCNC: 0.4 MG/DL
BUN SERPL-MCNC: 14 MG/DL
CALCIUM SERPL-MCNC: 9.9 MG/DL
CHLORIDE SERPL-SCNC: 105 MMOL/L
CO2 SERPL-SCNC: 28 MMOL/L
CREAT SERPL-MCNC: 0.84 MG/DL
EOSINOPHIL # BLD AUTO: 0.17 K/UL
EOSINOPHIL NFR BLD AUTO: 2.4 %
GLUCOSE SERPL-MCNC: 126 MG/DL
HCT VFR BLD CALC: 40 %
HGB BLD-MCNC: 13.4 G/DL
IMM GRANULOCYTES NFR BLD AUTO: 0.1 %
LYMPHOCYTES # BLD AUTO: 1.62 K/UL
LYMPHOCYTES NFR BLD AUTO: 22.5 %
MAN DIFF?: NORMAL
MCHC RBC-ENTMCNC: 28.5 PG
MCHC RBC-ENTMCNC: 33.5 GM/DL
MCV RBC AUTO: 84.9 FL
MONOCYTES # BLD AUTO: 0.53 K/UL
MONOCYTES NFR BLD AUTO: 7.4 %
NEUTROPHILS # BLD AUTO: 4.83 K/UL
NEUTROPHILS NFR BLD AUTO: 67.2 %
PLATELET # BLD AUTO: 197 K/UL
POTASSIUM SERPL-SCNC: 4.4 MMOL/L
PROT SERPL-MCNC: 6.7 G/DL
RBC # BLD: 4.71 M/UL
RBC # FLD: 13.3 %
SODIUM SERPL-SCNC: 142 MMOL/L
WBC # FLD AUTO: 7.19 K/UL

## 2021-08-24 ENCOUNTER — APPOINTMENT (OUTPATIENT)
Dept: INTERNAL MEDICINE | Facility: CLINIC | Age: 61
End: 2021-08-24
Payer: COMMERCIAL

## 2021-08-24 VITALS
BODY MASS INDEX: 30.24 KG/M2 | OXYGEN SATURATION: 99 % | TEMPERATURE: 96.2 F | HEART RATE: 70 BPM | SYSTOLIC BLOOD PRESSURE: 134 MMHG | HEIGHT: 59 IN | RESPIRATION RATE: 16 BRPM | DIASTOLIC BLOOD PRESSURE: 94 MMHG | WEIGHT: 150 LBS

## 2021-08-24 DIAGNOSIS — S76.112A STRAIN OF LEFT QUADRICEPS MUSCLE, FASCIA AND TENDON, INITIAL ENCOUNTER: ICD-10-CM

## 2021-08-24 PROCEDURE — 99214 OFFICE O/P EST MOD 30 MIN: CPT | Mod: 25

## 2021-08-24 PROCEDURE — 99396 PREV VISIT EST AGE 40-64: CPT | Mod: 25

## 2021-08-24 NOTE — DATA REVIEWED
[FreeTextEntry1] : blood work from July is reviewed.\par \par CAT scan of the abdomen and pelvis performed in July is reviewed as well

## 2021-08-24 NOTE — PLAN
[FreeTextEntry1] : #1. Continued medications as outlined above.\par \par 2. Without the amlodipine 2.5 mg daily to her antihypertensive regimen, as her blood pressure is elevated at this time.\par \par 3. Followup in 6 weeks with Mrs. Becerra for blood pressure check\par \par 4. The patient will apply heating pad to the left thigh. She is told that she can take a nonsteroidal anti-inflammatory drug once or twice a day for the next few days to see if this helps to decrease the discomfort and inflammation.\par \par 5. Pneumovax and the new shingles vaccine have been recommended but the patient refuses\par \par 6. Follow up with myself in 6 months with full pulmonary function testing and routine fasting blood work.

## 2021-08-24 NOTE — HEALTH RISK ASSESSMENT
[Yes] : Yes [Monthly or less (1 pt)] : Monthly or less (1 point) [1 or 2 (0 pts)] : 1 or 2 (0 points) [Never (0 pts)] : Never (0 points) [No] : In the past 12 months have you used drugs other than those required for medical reasons? No [0] : 2) Feeling down, depressed, or hopeless: Not at all (0) [] : No

## 2021-08-24 NOTE — HISTORY OF PRESENT ILLNESS
[FreeTextEntry1] : The patient comes in for a yearly wellness evaluation\par  [de-identified] : He is, that she has been fairly stable. At the time of her last visit, her metoprolol was increased from 25-50 mg daily. Her blood pressure at that time but it was reevaluated was good. She has been under the moderate amounts of pressure and stress, due to a recent illness with her daughter. The patient has lost 5 pounds. She has been exercising by walking and performing aerobics. She denies any chest pains or palpitations. Her last cardiac workup was 5 years ago.\par \par The patient developed the onset of pain in her left upper posterior thigh several days ago. It appears to be more prominent when walking up stairs. She denies any trauma to the area. She does not recall performing any exercise activity that may have triggered the pain.\par \par The patient did have an episode of abdominal pain last month. She was evaluated in this office by Dr. Dhillon and blood work was performed. The CAT scan was unremarkable. The pain eventually resolved without any intervention.\par \par The patient did receive 2 doses of the vaccination for the corona virus. She has been seen by her gynecologist. She has undergone Pap smear and mammography. She is stable at this time. She denies any acute constitutional symptoms. She comes in for this assessment

## 2021-08-24 NOTE — PHYSICAL EXAM
[Well Nourished] : well nourished [Well Developed] : well developed [Normal Sclera/Conjunctiva] : normal sclera/conjunctiva [Well-Appearing] : well-appearing [PERRL] : pupils equal round and reactive to light [EOMI] : extraocular movements intact [Normal Outer Ear/Nose] : the outer ears and nose were normal in appearance [Normal Oropharynx] : the oropharynx was normal [No JVD] : no jugular venous distention [No Lymphadenopathy] : no lymphadenopathy [Supple] : supple [Thyroid Normal, No Nodules] : the thyroid was normal and there were no nodules present [No Respiratory Distress] : no respiratory distress  [No Accessory Muscle Use] : no accessory muscle use [Clear to Auscultation] : lungs were clear to auscultation bilaterally [Normal Rate] : normal rate  [Regular Rhythm] : with a regular rhythm [Normal S1, S2] : normal S1 and S2 [No Murmur] : no murmur heard [No Carotid Bruits] : no carotid bruits [No Abdominal Bruit] : a ~M bruit was not heard ~T in the abdomen [No Varicosities] : no varicosities [Pedal Pulses Present] : the pedal pulses are present [No Edema] : there was no peripheral edema [No Palpable Aorta] : no palpable aorta [No Extremity Clubbing/Cyanosis] : no extremity clubbing/cyanosis [Soft] : abdomen soft [Non Tender] : non-tender [Non-distended] : non-distended [No Masses] : no abdominal mass palpated [No HSM] : no HSM [Normal Bowel Sounds] : normal bowel sounds [Normal Posterior Cervical Nodes] : no posterior cervical lymphadenopathy [Normal Anterior Cervical Nodes] : no anterior cervical lymphadenopathy [No CVA Tenderness] : no CVA  tenderness [No Spinal Tenderness] : no spinal tenderness [No Joint Swelling] : no joint swelling [No Rash] : no rash [No Focal Deficits] : no focal deficits [Coordination Grossly Intact] : coordination grossly intact [Normal Gait] : normal gait [Deep Tendon Reflexes (DTR)] : deep tendon reflexes were 2+ and symmetric [Normal Affect] : the affect was normal [Normal Insight/Judgement] : insight and judgment were intact [de-identified] : There is slight swelling possibly representing a partially torn quadriceps posteriorly in the left upper outer thigh region. It is tender to palpation. There is no significant warmth. [de-identified] : midline sx scar

## 2021-09-14 ENCOUNTER — RX RENEWAL (OUTPATIENT)
Age: 61
End: 2021-09-14

## 2021-10-07 ENCOUNTER — APPOINTMENT (OUTPATIENT)
Dept: INTERNAL MEDICINE | Facility: CLINIC | Age: 61
End: 2021-10-07
Payer: COMMERCIAL

## 2021-10-07 VITALS
HEIGHT: 59 IN | WEIGHT: 154 LBS | HEART RATE: 70 BPM | RESPIRATION RATE: 16 BRPM | BODY MASS INDEX: 31.04 KG/M2 | OXYGEN SATURATION: 98 % | TEMPERATURE: 98 F | SYSTOLIC BLOOD PRESSURE: 150 MMHG | DIASTOLIC BLOOD PRESSURE: 80 MMHG

## 2021-10-07 PROCEDURE — 99213 OFFICE O/P EST LOW 20 MIN: CPT

## 2021-10-07 NOTE — HISTORY OF PRESENT ILLNESS
[FreeTextEntry1] : bp check [de-identified] : At last visit Amlodopine 2.5 mg was added.  She is trying to diet and has joined a gym.  Goes 3x/week Lost 5 lbs.  Has cut out breads, sweets and pasta.  Feels well. No complaints

## 2021-12-09 ENCOUNTER — APPOINTMENT (OUTPATIENT)
Dept: INTERNAL MEDICINE | Facility: CLINIC | Age: 61
End: 2021-12-09
Payer: COMMERCIAL

## 2021-12-09 VITALS
OXYGEN SATURATION: 97 % | RESPIRATION RATE: 16 BRPM | SYSTOLIC BLOOD PRESSURE: 130 MMHG | DIASTOLIC BLOOD PRESSURE: 82 MMHG | TEMPERATURE: 96.3 F | HEIGHT: 59 IN | BODY MASS INDEX: 30.84 KG/M2 | WEIGHT: 153 LBS

## 2021-12-09 PROCEDURE — 99213 OFFICE O/P EST LOW 20 MIN: CPT

## 2021-12-09 NOTE — HISTORY OF PRESENT ILLNESS
[FreeTextEntry1] : BP check [de-identified] : Amlodopine increased to 5 mg last visit.  Tolerating this well.  She continues to exercise at gym and is watching diet however weight stable. Her brother recently  of covid. She received her Covid booster.

## 2021-12-09 NOTE — PLAN
[FreeTextEntry1] : \par \par Continue current meds,exercise, healthy diet.\par \par FU in March as planned. \par \par \par \par

## 2021-12-30 ENCOUNTER — OUTPATIENT (OUTPATIENT)
Dept: OUTPATIENT SERVICES | Facility: HOSPITAL | Age: 61
LOS: 1 days | End: 2021-12-30
Payer: COMMERCIAL

## 2021-12-30 DIAGNOSIS — Z20.828 CONTACT WITH AND (SUSPECTED) EXPOSURE TO OTHER VIRAL COMMUNICABLE DISEASES: ICD-10-CM

## 2021-12-30 LAB — SARS-COV-2 RNA SPEC QL NAA+PROBE: SIGNIFICANT CHANGE UP

## 2021-12-30 PROCEDURE — U0005: CPT

## 2021-12-30 PROCEDURE — C9803: CPT

## 2021-12-30 PROCEDURE — U0003: CPT

## 2021-12-31 DIAGNOSIS — Z20.828 CONTACT WITH AND (SUSPECTED) EXPOSURE TO OTHER VIRAL COMMUNICABLE DISEASES: ICD-10-CM

## 2022-01-11 ENCOUNTER — APPOINTMENT (OUTPATIENT)
Dept: INTERNAL MEDICINE | Facility: CLINIC | Age: 62
End: 2022-01-11
Payer: COMMERCIAL

## 2022-01-11 ENCOUNTER — APPOINTMENT (OUTPATIENT)
Dept: PHYSICAL MEDICINE AND REHAB | Facility: CLINIC | Age: 62
End: 2022-01-11
Payer: COMMERCIAL

## 2022-01-11 VITALS
DIASTOLIC BLOOD PRESSURE: 80 MMHG | WEIGHT: 153 LBS | RESPIRATION RATE: 16 BRPM | BODY MASS INDEX: 30.84 KG/M2 | TEMPERATURE: 98.6 F | SYSTOLIC BLOOD PRESSURE: 150 MMHG | OXYGEN SATURATION: 99 % | HEIGHT: 59 IN | HEART RATE: 73 BPM

## 2022-01-11 VITALS — SYSTOLIC BLOOD PRESSURE: 160 MMHG | DIASTOLIC BLOOD PRESSURE: 100 MMHG

## 2022-01-11 VITALS — SYSTOLIC BLOOD PRESSURE: 170 MMHG | TEMPERATURE: 96.7 F | HEART RATE: 68 BPM | DIASTOLIC BLOOD PRESSURE: 110 MMHG

## 2022-01-11 DIAGNOSIS — M48.062 SPINAL STENOSIS, LUMBAR REGION WITH NEUROGENIC CLAUDICATION: ICD-10-CM

## 2022-01-11 DIAGNOSIS — M48.061 SPINAL STENOSIS, LUMBAR REGION WITHOUT NEUROGENIC CLAUDICATION: ICD-10-CM

## 2022-01-11 PROCEDURE — 99213 OFFICE O/P EST LOW 20 MIN: CPT

## 2022-01-11 PROCEDURE — 99204 OFFICE O/P NEW MOD 45 MIN: CPT | Mod: GC

## 2022-01-11 NOTE — ASSESSMENT
[FreeTextEntry1] : Ms. ISHA LANDRY is a 61 year old female who presents with 3 month history of bilateral leg pain/ claudication suspicious for lumbar spinal stenosis. Overall she has not had any significant improvement in her pain. No red flag signs/ symptoms.\par \par Will recommend:\par - MRI L-spine to assess for DDD/stenosis given no improvement with 6+ weeks of provider guided care. \par - Methocarbamol 500mg Qhs PRN. Advised of side effects including sedation. \par - PT script provided for lumbar spine, core strengthening, ROM exercises, and therapeutic modalities as tolerated.\par - Asymptiomatic elevated BP. She was instructed to follow up with her PCP for BP  management. Patient know to go to ED if she experiences any new symptoms such as severe HA, dizziness, weakness or blurry vision. \par - Recommend Tylenol for pain as needed instead of NSAIDs given elevated BP.  \par \par RTC in 4-5 weeks once MRI completed and to assess progress in therapy. Patient educated on red flag signs including any changes to their bowel/bladder control, groin numbness or new weakness. Patient knows to seek immediate attention by calling 911 or going to nearest ER if these symptoms appear. \par

## 2022-01-11 NOTE — PHYSICAL EXAM
[FreeTextEntry1] : PE:\par Constitutional: In NAD, calm and cooperative\par MSK (Back)\par 	Inspection: no gross swelling identified\par 	Palpation: No Tenderness of the bilateral lower lumbar paraspinals, GT, or ITB\par 	ROM: No pain at end lumbar extension/flexion\par 	Strength: 5/5 strength in bilateral lower extremities\par 	Reflexes: 2+ Patella reflex bilaterally, 2+ Achilles reflex bilaterally, negative clonus bilaterally\par 	Sensation: Intact to light touch in bilateral lower extremities\par Special tests:\par SLR: neg\par IAN: neg\par FADIR: neg\par Facet loading: neg\par \par Other Extremities:\par Palpable pedal pulses bilaterally and well perfused\par No lower extremity edema

## 2022-01-11 NOTE — HISTORY OF PRESENT ILLNESS
[FreeTextEntry1] : Ms. ISHA LANDRY is a 61 year old female who presents with leg pain. \par \par Location: bilateral leg pain, L=R\par Onset: 3 months.  Insidious/ gradual onset. Feels that it started after adding amlodipine but shes unsure. \par Provocation/Palliative: difficulty climbing stairs. Worse at night. Sleeping ok. Sitting/ elevating legs. \par Quality: Both legs squeezing/ throbbing. \par Radiation: bilateral legs anterior/posterior thighs down to calves. \par Severity:  7/10\par Timing: intermittent. \par \par Denies any associated numbness. Denies any associated leg weakness. Denies any loss of bowel/bladder control or any groin numbness. Denies any significant leg swelling. \par Previous medications trialed: NSAIDs, applying voltaren gel. Minimal improvement. \par Previous procedures relevant to complaint: none\par Conservative therapy tried?: Has had PT in the past for lower back 3 years ago. Had chiropractic care.  No acupuncture or massage.

## 2022-01-11 NOTE — PLAN
[FreeTextEntry1] : 1. Will have patient stop amlodipine at this time as patient feels that it is contributing to fatigue and leg pain and will start patient on olmesartan 20mg. Patient to take dose upon retrieval from pharmacy\par \par 2. Patient to obtain fasting bw to further assess uncontrolled HTN\par \par 3.  Continue with low sodium diet, diet, exercise\par \par 4. To f/u in 3 weeks for BP check\par \par 5. To see Dr. Rosales 3/2022 f9r follow up \par \par \par All questions answered. Agrees with plan above

## 2022-01-11 NOTE — HISTORY OF PRESENT ILLNESS
[FreeTextEntry8] : Patient is a 61- year-old female with PMH of anxiety, GERD, COPD, HTN, LBP who presents for urgent evaluation of elevated BP. Reports she was seen earlier today at pain management and was noted to have elevated /110. Patient is currently taking amlodipine 5mg and metoprolol 50mg for BP management. She takes both her medications at night. Did not take meds yet today.  She reports she feels that amlodipine makes her very fatigued and causes her legs to hurt. She reports she is compliant with diet and watches sodium intake. She reports she checks her BP at home and readings are usually 130/90 range. Patient asymptomatic at this time, denies HA, ringing in ears, dizziness.

## 2022-01-11 NOTE — DATA REVIEWED
[Plain X-Rays] : plain X-Rays [FreeTextEntry1] : L-spine XR 12/27/19:\par Mild anterolisthesis L4-L5\par Mild DDD  L4/5 and L5/1 with mod facet arthrosis\par T10-T11 with mod-severe DDD

## 2022-01-11 NOTE — PHYSICAL EXAM
[No Acute Distress] : no acute distress [Well-Appearing] : well-appearing [No Lymphadenopathy] : no lymphadenopathy [Supple] : supple [No Respiratory Distress] : no respiratory distress  [No Accessory Muscle Use] : no accessory muscle use [Clear to Auscultation] : lungs were clear to auscultation bilaterally [Normal Rate] : normal rate  [Regular Rhythm] : with a regular rhythm [Normal S1, S2] : normal S1 and S2 [Normal Gait] : normal gait [Normal Affect] : the affect was normal [de-identified] : Mild non pitting edema noted B/L

## 2022-01-12 ENCOUNTER — OUTPATIENT (OUTPATIENT)
Dept: OUTPATIENT SERVICES | Facility: HOSPITAL | Age: 62
LOS: 1 days | End: 2022-01-12
Payer: COMMERCIAL

## 2022-01-12 DIAGNOSIS — Z20.828 CONTACT WITH AND (SUSPECTED) EXPOSURE TO OTHER VIRAL COMMUNICABLE DISEASES: ICD-10-CM

## 2022-01-12 LAB — SARS-COV-2 RNA SPEC QL NAA+PROBE: SIGNIFICANT CHANGE UP

## 2022-01-12 PROCEDURE — U0005: CPT

## 2022-01-12 PROCEDURE — U0003: CPT

## 2022-01-12 PROCEDURE — C9803: CPT

## 2022-01-13 DIAGNOSIS — Z20.828 CONTACT WITH AND (SUSPECTED) EXPOSURE TO OTHER VIRAL COMMUNICABLE DISEASES: ICD-10-CM

## 2022-01-18 ENCOUNTER — OUTPATIENT (OUTPATIENT)
Dept: OUTPATIENT SERVICES | Facility: HOSPITAL | Age: 62
LOS: 1 days | End: 2022-01-18
Payer: COMMERCIAL

## 2022-01-18 ENCOUNTER — APPOINTMENT (OUTPATIENT)
Dept: MRI IMAGING | Facility: CLINIC | Age: 62
End: 2022-01-18
Payer: COMMERCIAL

## 2022-01-18 DIAGNOSIS — M48.062 SPINAL STENOSIS, LUMBAR REGION WITH NEUROGENIC CLAUDICATION: ICD-10-CM

## 2022-01-18 PROCEDURE — 72148 MRI LUMBAR SPINE W/O DYE: CPT | Mod: 26

## 2022-01-18 PROCEDURE — 72148 MRI LUMBAR SPINE W/O DYE: CPT

## 2022-01-19 ENCOUNTER — TRANSCRIPTION ENCOUNTER (OUTPATIENT)
Age: 62
End: 2022-01-19

## 2022-02-08 ENCOUNTER — APPOINTMENT (OUTPATIENT)
Dept: INTERNAL MEDICINE | Facility: CLINIC | Age: 62
End: 2022-02-08
Payer: COMMERCIAL

## 2022-02-08 VITALS — DIASTOLIC BLOOD PRESSURE: 80 MMHG | SYSTOLIC BLOOD PRESSURE: 140 MMHG

## 2022-02-08 VITALS
OXYGEN SATURATION: 97 % | SYSTOLIC BLOOD PRESSURE: 142 MMHG | TEMPERATURE: 98.5 F | DIASTOLIC BLOOD PRESSURE: 82 MMHG | RESPIRATION RATE: 16 BRPM | WEIGHT: 155 LBS | HEIGHT: 59 IN | BODY MASS INDEX: 31.25 KG/M2 | HEART RATE: 68 BPM

## 2022-02-08 PROCEDURE — 99213 OFFICE O/P EST LOW 20 MIN: CPT

## 2022-02-08 RX ORDER — AMLODIPINE BESYLATE 5 MG/1
5 TABLET ORAL
Qty: 30 | Refills: 5 | Status: DISCONTINUED | COMMUNITY
Start: 2021-08-24 | End: 2022-02-08

## 2022-02-08 NOTE — PHYSICAL EXAM
[No Acute Distress] : no acute distress [Well-Appearing] : well-appearing [No Lymphadenopathy] : no lymphadenopathy [Supple] : supple [No Respiratory Distress] : no respiratory distress  [No Accessory Muscle Use] : no accessory muscle use [Clear to Auscultation] : lungs were clear to auscultation bilaterally [Normal Rate] : normal rate  [Regular Rhythm] : with a regular rhythm [Normal S1, S2] : normal S1 and S2 [Normal Gait] : normal gait [Normal Affect] : the affect was normal [No Edema] : there was no peripheral edema [Alert and Oriented x3] : oriented to person, place, and time

## 2022-02-08 NOTE — HISTORY OF PRESENT ILLNESS
[FreeTextEntry1] : F/u BP check  [de-identified] : Patient is a 61- year-old female with PMH of anxiety, GERD, COPD, HTN, LBP presents to office today for BP check. Patient recently seen in office 1/11/22 and noted to have elevated BP. At this time, patient was taking amlodipine and was experiencing fatigue and leg pain. Amlodipine was stopped and patient was started on olmesartan 20 mg. Reports she has been tolerating well. Patient has been checking BP at home and obtaining readings 140-145/ 80-85 range. She reports she has been limiting salt intake. Feels well today no complaints.

## 2022-02-08 NOTE — PLAN
[FreeTextEntry1] : 1. Will increase olmesartan from 20 mg to 40 mg. To continue monitoring BP at home and keeping log. Advised patient to bring home machine to next visit. To notify if BP <90/60 of if fatigue/ dizziness develops\par \par 2. Fasting labs still pending- advised patient to obtain labs prior to next visit with Dr. Rosales on 3/3/22. \par \par 3. Continue with low sodium diet + exercise\par \par All questions answered. Agrees with plan above

## 2022-02-12 ENCOUNTER — LABORATORY RESULT (OUTPATIENT)
Age: 62
End: 2022-02-12

## 2022-02-14 LAB
25(OH)D3 SERPL-MCNC: 43.7 NG/ML
ALBUMIN SERPL ELPH-MCNC: 4.8 G/DL
ALP BLD-CCNC: 60 U/L
ALT SERPL-CCNC: 17 U/L
ANION GAP SERPL CALC-SCNC: 14 MMOL/L
APPEARANCE: CLEAR
AST SERPL-CCNC: 13 U/L
BACTERIA: NEGATIVE
BASOPHILS # BLD AUTO: 0.03 K/UL
BASOPHILS NFR BLD AUTO: 0.4 %
BILIRUB SERPL-MCNC: 0.4 MG/DL
BILIRUBIN URINE: NEGATIVE
BLOOD URINE: NEGATIVE
BUN SERPL-MCNC: 20 MG/DL
CALCIUM SERPL-MCNC: 10.2 MG/DL
CHLORIDE SERPL-SCNC: 108 MMOL/L
CHOLEST SERPL-MCNC: 199 MG/DL
CO2 SERPL-SCNC: 23 MMOL/L
COLOR: NORMAL
CREAT SERPL-MCNC: 0.84 MG/DL
EOSINOPHIL # BLD AUTO: 0.18 K/UL
EOSINOPHIL NFR BLD AUTO: 2.6 %
ESTIMATED AVERAGE GLUCOSE: 105 MG/DL
GLUCOSE QUALITATIVE U: NEGATIVE
GLUCOSE SERPL-MCNC: 113 MG/DL
HBA1C MFR BLD HPLC: 5.3 %
HCT VFR BLD CALC: 44.2 %
HDLC SERPL-MCNC: 56 MG/DL
HGB BLD-MCNC: 14.3 G/DL
HYALINE CASTS: 1 /LPF
IMM GRANULOCYTES NFR BLD AUTO: 0.3 %
KETONES URINE: NEGATIVE
LDLC SERPL CALC-MCNC: 111 MG/DL
LEUKOCYTE ESTERASE URINE: ABNORMAL
LYMPHOCYTES # BLD AUTO: 1.61 K/UL
LYMPHOCYTES NFR BLD AUTO: 23.7 %
MAN DIFF?: NORMAL
MCHC RBC-ENTMCNC: 28.1 PG
MCHC RBC-ENTMCNC: 32.4 GM/DL
MCV RBC AUTO: 87 FL
MICROSCOPIC-UA: NORMAL
MONOCYTES # BLD AUTO: 0.47 K/UL
MONOCYTES NFR BLD AUTO: 6.9 %
NEUTROPHILS # BLD AUTO: 4.49 K/UL
NEUTROPHILS NFR BLD AUTO: 66.1 %
NITRITE URINE: NEGATIVE
NONHDLC SERPL-MCNC: 143 MG/DL
PH URINE: 5.5
PLATELET # BLD AUTO: 209 K/UL
POTASSIUM SERPL-SCNC: 5 MMOL/L
PROT SERPL-MCNC: 6.9 G/DL
PROTEIN URINE: NEGATIVE
RBC # BLD: 5.08 M/UL
RBC # FLD: 14.1 %
RED BLOOD CELLS URINE: 2 /HPF
SODIUM SERPL-SCNC: 145 MMOL/L
SPECIFIC GRAVITY URINE: 1.02
SQUAMOUS EPITHELIAL CELLS: 2 /HPF
T3FREE SERPL-MCNC: 2.8 PG/ML
T3RU NFR SERPL: 1.1 TBI
T4 FREE SERPL-MCNC: 1.2 NG/DL
T4 SERPL-MCNC: 7.7 UG/DL
TRIGL SERPL-MCNC: 162 MG/DL
TSH SERPL-ACNC: 2.58 UIU/ML
UROBILINOGEN URINE: NORMAL
WBC # FLD AUTO: 6.8 K/UL
WHITE BLOOD CELLS URINE: 22 /HPF

## 2022-02-17 ENCOUNTER — NON-APPOINTMENT (OUTPATIENT)
Age: 62
End: 2022-02-17

## 2022-02-17 DIAGNOSIS — R82.90 UNSPECIFIED ABNORMAL FINDINGS IN URINE: ICD-10-CM

## 2022-02-24 DIAGNOSIS — Z20.822 CONTACT WITH AND (SUSPECTED) EXPOSURE TO COVID-19: ICD-10-CM

## 2022-03-01 ENCOUNTER — APPOINTMENT (OUTPATIENT)
Dept: PHYSICAL MEDICINE AND REHAB | Facility: CLINIC | Age: 62
End: 2022-03-01

## 2022-03-02 LAB — SARS-COV-2 N GENE NPH QL NAA+PROBE: NOT DETECTED

## 2022-03-03 ENCOUNTER — APPOINTMENT (OUTPATIENT)
Dept: INTERNAL MEDICINE | Facility: CLINIC | Age: 62
End: 2022-03-03
Payer: COMMERCIAL

## 2022-03-03 VITALS
SYSTOLIC BLOOD PRESSURE: 156 MMHG | OXYGEN SATURATION: 97 % | BODY MASS INDEX: 30.64 KG/M2 | TEMPERATURE: 98.3 F | DIASTOLIC BLOOD PRESSURE: 95 MMHG | RESPIRATION RATE: 18 BRPM | WEIGHT: 152 LBS | HEART RATE: 74 BPM | HEIGHT: 59 IN

## 2022-03-03 DIAGNOSIS — R82.81 PYURIA: ICD-10-CM

## 2022-03-03 DIAGNOSIS — J44.9 CHRONIC OBSTRUCTIVE PULMONARY DISEASE, UNSPECIFIED: ICD-10-CM

## 2022-03-03 DIAGNOSIS — M54.10 RADICULOPATHY, SITE UNSPECIFIED: ICD-10-CM

## 2022-03-03 DIAGNOSIS — M48.00 SPINAL STENOSIS, SITE UNSPECIFIED: ICD-10-CM

## 2022-03-03 LAB
APPEARANCE: CLEAR
BACTERIA UR CULT: ABNORMAL
BACTERIA: NEGATIVE
BILIRUBIN URINE: NEGATIVE
BLOOD URINE: NEGATIVE
COLOR: YELLOW
GLUCOSE QUALITATIVE U: NEGATIVE
HYALINE CASTS: 3 /LPF
KETONES URINE: NEGATIVE
LEUKOCYTE ESTERASE URINE: ABNORMAL
MICROSCOPIC-UA: NORMAL
NITRITE URINE: NEGATIVE
PH URINE: 5
PROTEIN URINE: NEGATIVE
RED BLOOD CELLS URINE: 4 /HPF
SPECIFIC GRAVITY URINE: 1.03
SQUAMOUS EPITHELIAL CELLS: 2 /HPF
UROBILINOGEN URINE: NORMAL
WHITE BLOOD CELLS URINE: 6 /HPF

## 2022-03-03 PROCEDURE — 94729 DIFFUSING CAPACITY: CPT

## 2022-03-03 PROCEDURE — 94727 GAS DIL/WSHOT DETER LNG VOL: CPT

## 2022-03-03 PROCEDURE — 99214 OFFICE O/P EST MOD 30 MIN: CPT | Mod: 25

## 2022-03-03 PROCEDURE — ZZZZZ: CPT

## 2022-03-03 PROCEDURE — 94010 BREATHING CAPACITY TEST: CPT

## 2022-03-03 NOTE — PLAN
[FreeTextEntry1] : 1. Continue with medical regimen as outlined above.\par \par 2. The patient will continue to monitor her blood pressure at home carefully.\par \par 3. Will not administer antibiotics for sterile pyuria as she is asymptomatic\par \par 4. Diet and weight loss have been stressed. She will continue with cardiovascular exercise\par \par 4. Will hold off on institution of statin therapy even though she has a slight increase in her cardiovascular risk. She would like to attempt to get her numbers down on her\par \par 5. Followup in 6 months with a wellness evaluation and EKG. She will undergo blood work to include lipid liver profile basic metabolic panel prior to that visit.

## 2022-03-03 NOTE — HISTORY OF PRESENT ILLNESS
[FreeTextEntry1] : The patient comes in today for a routine followup evaluation.\par  [de-identified] : The patient states, she is relatively stable at this time. Over the past several months, we have been adjusting her antihypertensive regimen. She was started on amlodipine back in August, but eventually the medication was discontinued due to progressive discomfort in her legs. She was then switched over to olmesartan, and this medication has been increased from 20 mg up to 40 mg. She is tolerating it without any issues. Her blood pressure at home has been ranging between 130 and 140 systolic, and in the mid 80s diastolic. She has been also going to the gym and perform stretching exercises so her legs are better. She did see a physical medicine physician for the leg discomfort and an MRI revealed spinal stenosis. No other recommendations were made.\par \par The patient is denies any urinary symptoms at this time such as urgency or frequency. She is attempting to diet and lose weight. She denies any chest pains or palpitations. She is up-to-date on colonoscopy. She has received 3 doses of the vaccination for the corona virus. She comes in for this assessment.

## 2022-03-03 NOTE — PHYSICAL EXAM
[Well Nourished] : well nourished [Well Developed] : well developed [Normal Oropharynx] : the oropharynx was normal [No JVD] : no jugular venous distention [Supple] : supple [No Respiratory Distress] : no respiratory distress  [Clear to Auscultation] : lungs were clear to auscultation bilaterally [No Accessory Muscle Use] : no accessory muscle use [Regular Rhythm] : with a regular rhythm [Normal S1, S2] : normal S1 and S2 [No Edema] : there was no peripheral edema [No Extremity Clubbing/Cyanosis] : no extremity clubbing/cyanosis [Soft] : abdomen soft [Non Tender] : non-tender [Normal Supraclavicular Nodes] : no supraclavicular lymphadenopathy [Normal Posterior Cervical Nodes] : no posterior cervical lymphadenopathy [Normal Anterior Cervical Nodes] : no anterior cervical lymphadenopathy [No CVA Tenderness] : no CVA  tenderness [No Rash] : no rash [Normal Affect] : the affect was normal [Normal Insight/Judgement] : insight and judgment were intact [de-identified] : mild centripetal obesity

## 2022-03-03 NOTE — DATA REVIEWED
[FreeTextEntry1] : Routine blood work is reviewed\par \par A pulmonary function test is performed. Lung volumes and flow rates are within normal limits. Bronchodilator reactivity is not assessed. The DLCO and saturation is maintained. This represents normal physiologic function.

## 2022-03-17 ENCOUNTER — RX RENEWAL (OUTPATIENT)
Age: 62
End: 2022-03-17

## 2022-04-15 ENCOUNTER — NON-APPOINTMENT (OUTPATIENT)
Age: 62
End: 2022-04-15

## 2022-06-02 ENCOUNTER — APPOINTMENT (OUTPATIENT)
Dept: INTERNAL MEDICINE | Facility: CLINIC | Age: 62
End: 2022-06-02
Payer: COMMERCIAL

## 2022-06-02 VITALS
HEART RATE: 78 BPM | TEMPERATURE: 98.8 F | SYSTOLIC BLOOD PRESSURE: 140 MMHG | RESPIRATION RATE: 16 BRPM | WEIGHT: 150 LBS | OXYGEN SATURATION: 97 % | BODY MASS INDEX: 30.24 KG/M2 | HEIGHT: 59 IN | DIASTOLIC BLOOD PRESSURE: 82 MMHG

## 2022-06-02 LAB — RHEUMATOID FACT SER QL: <10 IU/ML

## 2022-06-02 PROCEDURE — 99213 OFFICE O/P EST LOW 20 MIN: CPT

## 2022-06-02 NOTE — PHYSICAL EXAM
[Normal] : normal rate, regular rhythm, normal S1 and S2 and no murmur heard [No Edema] : there was no peripheral edema [Grossly Normal Strength/Tone] : grossly normal strength/tone [de-identified] : +heberdons nodes L thumb, R 2nd and 3rd DIPJ.

## 2022-06-02 NOTE — HISTORY OF PRESENT ILLNESS
[FreeTextEntry8] : bumps on fingers and toes\par \par Has noticed lumps to joints on fingers and toes.  Denies stiffness.  Notes intermittent pain with [id number]  at base of L thumb.  More aware the past month.  No fevers, rash, weight loss.  Denies FH of RA.

## 2022-06-03 LAB
ALBUMIN SERPL ELPH-MCNC: 4.7 G/DL
ALP BLD-CCNC: 53 U/L
ALT SERPL-CCNC: 16 U/L
ANA SER IF-ACNC: NEGATIVE
ANION GAP SERPL CALC-SCNC: 14 MMOL/L
APPEARANCE: CLEAR
AST SERPL-CCNC: 16 U/L
BACTERIA: NEGATIVE
BILIRUB DIRECT SERPL-MCNC: 0.1 MG/DL
BILIRUB INDIRECT SERPL-MCNC: 0.3 MG/DL
BILIRUB SERPL-MCNC: 0.4 MG/DL
BILIRUBIN URINE: NEGATIVE
BLOOD URINE: NEGATIVE
BUN SERPL-MCNC: 13 MG/DL
CALCIUM SERPL-MCNC: 9.6 MG/DL
CHLORIDE SERPL-SCNC: 106 MMOL/L
CHOLEST SERPL-MCNC: 203 MG/DL
CO2 SERPL-SCNC: 23 MMOL/L
COLOR: YELLOW
CREAT SERPL-MCNC: 0.82 MG/DL
EGFR: 80 ML/MIN/1.73M2
ERYTHROCYTE [SEDIMENTATION RATE] IN BLOOD BY WESTERGREN METHOD: 6 MM/HR
GLUCOSE QUALITATIVE U: NEGATIVE
GLUCOSE SERPL-MCNC: 157 MG/DL
HDLC SERPL-MCNC: 51 MG/DL
HYALINE CASTS: 2 /LPF
KETONES URINE: NEGATIVE
LDLC SERPL CALC-MCNC: 110 MG/DL
LEUKOCYTE ESTERASE URINE: NEGATIVE
MICROSCOPIC-UA: NORMAL
NITRITE URINE: NEGATIVE
NONHDLC SERPL-MCNC: 152 MG/DL
PH URINE: 5.5
POTASSIUM SERPL-SCNC: 4.2 MMOL/L
PROT SERPL-MCNC: 7.1 G/DL
PROTEIN URINE: NORMAL
RED BLOOD CELLS URINE: 2 /HPF
SODIUM SERPL-SCNC: 143 MMOL/L
SPECIFIC GRAVITY URINE: 1.02
SQUAMOUS EPITHELIAL CELLS: 1 /HPF
TRIGL SERPL-MCNC: 214 MG/DL
UROBILINOGEN URINE: NORMAL
WHITE BLOOD CELLS URINE: 2 /HPF

## 2022-06-06 ENCOUNTER — NON-APPOINTMENT (OUTPATIENT)
Age: 62
End: 2022-06-06

## 2022-07-29 ENCOUNTER — APPOINTMENT (OUTPATIENT)
Dept: INTERNAL MEDICINE | Facility: CLINIC | Age: 62
End: 2022-07-29

## 2022-07-29 ENCOUNTER — EMERGENCY (EMERGENCY)
Facility: HOSPITAL | Age: 62
LOS: 0 days | Discharge: HOME | End: 2022-07-29
Attending: EMERGENCY MEDICINE
Payer: COMMERCIAL

## 2022-07-29 VITALS — WEIGHT: 149.91 LBS

## 2022-07-29 VITALS
HEIGHT: 59 IN | BODY MASS INDEX: 29.64 KG/M2 | OXYGEN SATURATION: 98 % | TEMPERATURE: 98 F | WEIGHT: 147 LBS | SYSTOLIC BLOOD PRESSURE: 126 MMHG | DIASTOLIC BLOOD PRESSURE: 64 MMHG | HEART RATE: 72 BPM

## 2022-07-29 VITALS
DIASTOLIC BLOOD PRESSURE: 85 MMHG | OXYGEN SATURATION: 98 % | RESPIRATION RATE: 19 BRPM | SYSTOLIC BLOOD PRESSURE: 178 MMHG | HEART RATE: 77 BPM | TEMPERATURE: 98 F

## 2022-07-29 DIAGNOSIS — L50.9 URTICARIA, UNSPECIFIED: ICD-10-CM

## 2022-07-29 DIAGNOSIS — M25.50 PAIN IN UNSPECIFIED JOINT: ICD-10-CM

## 2022-07-29 PROCEDURE — 36415 COLL VENOUS BLD VENIPUNCTURE: CPT

## 2022-07-29 PROCEDURE — 99214 OFFICE O/P EST MOD 30 MIN: CPT | Mod: 25

## 2022-07-29 PROCEDURE — 99283 EMERGENCY DEPT VISIT LOW MDM: CPT

## 2022-07-29 RX ORDER — DIPHENHYDRAMINE HCL 50 MG
1 CAPSULE ORAL
Qty: 20 | Refills: 0
Start: 2022-07-29 | End: 2022-08-07

## 2022-07-29 RX ORDER — DIPHENHYDRAMINE HCL 25 MG
1 CAPSULE ORAL
Qty: 20 | Refills: 0 | DISCHARGE
Start: 2022-07-29 | End: 2022-08-07

## 2022-07-29 RX ORDER — FAMOTIDINE 10 MG/ML
1 INJECTION INTRAVENOUS
Qty: 20 | Refills: 0
Start: 2022-07-29 | End: 2022-08-07

## 2022-07-29 RX ORDER — FAMOTIDINE 10 MG/ML
20 INJECTION INTRAVENOUS ONCE
Refills: 0 | Status: COMPLETED | OUTPATIENT
Start: 2022-07-29 | End: 2022-07-29

## 2022-07-29 RX ORDER — EPINEPHRINE 0.3 MG/.3ML
0.3 INJECTION INTRAMUSCULAR
Qty: 1 | Refills: 1 | Status: ACTIVE | COMMUNITY
Start: 1900-01-01 | End: 1900-01-01

## 2022-07-29 RX ORDER — PAROXETINE HYDROCHLORIDE 30 MG/1
30 TABLET, FILM COATED ORAL DAILY
Qty: 30 | Refills: 5 | Status: DISCONTINUED | COMMUNITY
Start: 2017-11-08 | End: 2022-07-29

## 2022-07-29 RX ADMIN — FAMOTIDINE 20 MILLIGRAM(S): 10 INJECTION INTRAVENOUS at 21:46

## 2022-07-29 NOTE — ED STATDOCS - NSFOLLOWUPINSTRUCTIONS_ED_ALL_ED_FT
please follow up with your doctor in 2-3 days.   take medications as prescribed.   return to ED for any concerns

## 2022-07-29 NOTE — ED ADULT TRIAGE NOTE - CHIEF COMPLAINT QUOTE
Pt presents to ER c/o allergic reaction. Pt reports she began having large pruritic welts on her body last night and saw her PCP today and was prescribed prednisone. Pt reports symptoms were not relieved. Airway patent denies SOB

## 2022-07-29 NOTE — ED STATDOCS - OBJECTIVE STATEMENT
60 y/o female presents to the ED c/o hives. Pt is currently being worked up by allergist. Pt was seen this morning and started on prednisone. Denies any other sx besides itchy rash. Rash has improved since this morning.

## 2022-07-29 NOTE — ED ADULT NURSE NOTE - OBJECTIVE STATEMENT
Patient presents to the emergency room with complaints of allergic reaction. Patient reports having episodes of hives, itchiness, swollen tongue and lip, swollen hands, and "welts" on body. Patient reports this has been going on for years. Patient has been prescribed an epi pen in the past, was put on prednisone today by PCP and has appointment with an allergist. Patient reports having high blood pressure which she is on metoprolol and olmesartan for. Patient with swollen, reddened right hand at this time, airway patent, no difficulty swallowing or speaking.

## 2022-07-29 NOTE — ED STATDOCS - PATIENT PORTAL LINK FT
You can access the FollowMyHealth Patient Portal offered by [facility] by registering at the following website: [email or web address]. By joining [employer]’s FollowMyHealth portal, you will also be able to view your health information using other applications (apps) compatible with our system.

## 2022-07-31 ENCOUNTER — EMERGENCY (EMERGENCY)
Facility: HOSPITAL | Age: 62
LOS: 0 days | Discharge: HOME | End: 2022-07-31
Attending: EMERGENCY MEDICINE
Payer: COMMERCIAL

## 2022-07-31 VITALS
HEART RATE: 61 BPM | TEMPERATURE: 98 F | DIASTOLIC BLOOD PRESSURE: 89 MMHG | OXYGEN SATURATION: 100 % | SYSTOLIC BLOOD PRESSURE: 175 MMHG | RESPIRATION RATE: 16 BRPM

## 2022-07-31 VITALS — HEIGHT: 61 IN | WEIGHT: 134.92 LBS
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
## 2022-07-31 DIAGNOSIS — I10 ESSENTIAL (PRIMARY) HYPERTENSION: ICD-10-CM
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
## 2022-07-31 DIAGNOSIS — T78.3XXA ANGIONEUROTIC EDEMA, INITIAL ENCOUNTER: ICD-10-CM

## 2022-07-31 DIAGNOSIS — R22.0 LOCALIZED SWELLING, MASS AND LUMP, HEAD: ICD-10-CM

## 2022-07-31 DIAGNOSIS — Z88.0 ALLERGY STATUS TO PENICILLIN: ICD-10-CM

## 2022-07-31 PROBLEM — Z78.9 OTHER SPECIFIED HEALTH STATUS: Chronic | Status: ACTIVE | Noted: 2022-07-29

## 2022-07-31 PROCEDURE — 96374 THER/PROPH/DIAG INJ IV PUSH: CPT

## 2022-07-31 PROCEDURE — 99284 EMERGENCY DEPT VISIT MOD MDM: CPT

## 2022-07-31 PROCEDURE — 99284 EMERGENCY DEPT VISIT MOD MDM: CPT | Mod: 25

## 2022-07-31 PROCEDURE — 96375 TX/PRO/DX INJ NEW DRUG ADDON: CPT

## 2022-07-31 RX ORDER — DEXAMETHASONE 0.5 MG/5ML
10 ELIXIR ORAL ONCE
Refills: 0 | Status: COMPLETED | OUTPATIENT
Start: 2022-07-31 | End: 2022-07-31

## 2022-07-31 RX ORDER — DIPHENHYDRAMINE HCL 50 MG
25 CAPSULE ORAL ONCE
Refills: 0 | Status: COMPLETED | OUTPATIENT
Start: 2022-07-31 | End: 2022-07-31

## 2022-07-31 RX ADMIN — Medication 102 MILLIGRAM(S): at 12:17

## 2022-07-31 RX ADMIN — Medication 25 MILLIGRAM(S): at 11:42

## 2022-07-31 NOTE — ED STATDOCS - NS ED ROS FT
Constitutional: nad, well appearing  HEENT:  no nasal congestion, eye drainage or ear pain. + lip swelling   CVS:  no cp  Resp:  No sob, no cough  GI:  no abdominal pain, no nausea or vomiting  :  no dysuria  MSK: no joint pain or limited ROM  Skin: no rash  Neuro: no change in mental status or level of consciousness  Heme/lymph: no bleeding

## 2022-07-31 NOTE — ED STATDOCS - PATIENT PORTAL LINK FT
You can access the FollowMyHealth Patient Portal offered by [facility] by registering at the following website: [email or web address]. By joining [employer]’s FollowMyHealth portal, you will also be able to view your health information using other applications (apps) compatible with our system.

## 2022-07-31 NOTE — ED ADULT NURSE NOTE - OBJECTIVE STATEMENT
Pt presents to er with complaints lip swelling, states her systemic swelling has been going on for years, states today she went to her md and was instructed to come in for evaluation. Pt denies chest pain, sob.

## 2022-07-31 NOTE — ED STATDOCS - PROGRESS NOTE DETAILS
Pt. is a 51 year old female Hx HTN on Metoprolol, presents with edema to upper lip.  Pt. had swelling to eye and hand two days ago and was placed on prednisone.  stopped taking it due to elevated BP.  Benadryl taken this morning.  Neg. SOB, tongue swelling.  Pt. has an allergist and was ruled out for food allergy.  This intermittent swelling has been going on for 20 years.  No new medications, lotions, foods.  Amy Ozuna PA-C Paged Dr. Herman as patient spoke with him today covering for Dr. Rosales.  Amy Ozuna PA-C I spoke with Dr. Herman and he is aware of care plan and patient's condition.  Pt. aware I spoke with him.  Amy Ozuna PA-C Pt. feeling well.  Mild decrease in swelling of lip.  Neg. SOB, or tongue swelling.  Pt. will follow with Dr. Rosales and is aware of strict return precautions.  Continue Benadryl at home.   Amy Ozuna PA-C I spoke with Dr. Herman and he is aware of care plan and patient's condition.  Pt. aware I spoke with him.   Pt. is on Olmesarten which can cause angioedema.  Pt. aware and will speak with PMD for possible change in meds.    .Amy Ozuna PA-C

## 2022-07-31 NOTE — ED STATDOCS - CLINICAL SUMMARY MEDICAL DECISION MAKING FREE TEXT BOX
consider angioedema. No other complaints at this time. no evidence of compromised airway, will give decadron, benadryl, and discuss with ronald. consider angioedema. No other complaints at this time. no evidence of compromised airway, will give decadron, benadryl, and discuss with anselmi.              Pt. feeling well.  Mild decrease in swelling of lip.  Neg. SOB, or tongue swelling.  Pt. will follow with Dr. Rosales and is aware of strict return precautions.  Continue Benadryl at home.   Amy Ozuna PA-C consider angioedema. No other complaints at this time. no evidence of airway compromise, will give decadron, benadryl, and discuss with Dr. cardenas.  Consider hereditary vs related to olmesartan.  Dispo pending reassessment.                Pt. feeling well.  Mild decrease in swelling of lip.  Neg. SOB, or tongue swelling.  Pt. will follow with Dr. Rosales and is aware of strict return precautions.  Continue Benadryl at home.   Amy Ozuna PA-C

## 2022-07-31 NOTE — ED ADULT TRIAGE NOTE - CHIEF COMPLAINT QUOTE
Pt. to the ED C/O Swollen Lip - Pt. states she was sent by PCP for IV Benadryl - Pt. reports having this problem for years and is not diagnosed with cause- Denies trouble breathing-- Hx of HTN

## 2022-07-31 NOTE — ED STATDOCS - CARE PROVIDER_API CALL
Devan Rosales)  Internal Medicine; Pulmonary Disease  [address] [facility] [facility] [address], [place] [address]  Phone: [phone]  Fax: [phone]  Follow Up Time:

## 2022-07-31 NOTE — ED STATDOCS - NS ED ATTENDING STATEMENT MOD
This was a shared visit with the [place]. I reviewed and verified the documentation and independently performed the documented:

## 2022-07-31 NOTE — ED STATDOCS - PHYSICAL EXAMINATION
Constitutional: nad, well appearing  HEENT:  no nasal congestion, eye drainage or ear pain.    CVS:  no cp  Resp:  No sob, no cough  GI:  no abdominal pain, no nausea or vomiting  :  no dysuria  MSK: no joint pain or limited ROM  Skin: no rash  Neuro: no change in mental status or level of consciousness  Heme/lymph: no bleeding Constitutional: nad, well appearing  HEENT:  no nasal congestion, eye drainage or ear pain , +EDEMA to L upper lip   CVS:  no cp  Resp:  No sob, no cough  GI:  no abdominal pain, no nausea or vomiting  :  no dysuria  MSK: no joint pain or limited ROM  Skin: no rash  Neuro: no change in mental status or level of consciousness  Heme/lymph: no bleeding

## 2022-07-31 NOTE — ED STATDOCS - NSFOLLOWUPINSTRUCTIONS_ED_ALL_ED_FT
Angioedema    WHAT YOU NEED TO KNOW:    Angioedema is sudden swelling caused by fluid that collects in deep layers of the skin. Swelling occurs most often on the face, lips, tongue, or throat, but it can happen anywhere on the body. Your risk for angioedema increases if you have food or insect allergies or a family history of angioedema. Emotional stress, autoimmune disorders, and medicines, such as ACE inhibitors, also increase your risk. Your symptoms may be mild, or you may develop anaphylaxis. Anaphylaxis is a sudden, life-threatening reaction that needs immediate treatment.     DISCHARGE INSTRUCTIONS:    Call 911 for signs or symptoms of anaphylaxis, such as trouble breathing, swelling in your mouth or throat, or wheezing. You may also have itching, a rash, hives, or feel like you are going to faint.    Return to the emergency department if:   •You have sudden behavior changes or irritability.       •You are dizzy and your heart is beating faster than usual.       Contact your healthcare provider if:   •Your swelling does not improve, even after you take your medicines.       •You have questions or concerns about your condition or care.       Medicines:   •Antihistamines decrease mild symptoms such as itching or a rash.       •Epinephrine is used to treat severe allergic reactions such as anaphylaxis.       •Steroids may help decrease inflammation.      •Take your medicine as directed. Contact your healthcare provider if you think your medicine is not helping or if you have side effects. Tell him or her if you are allergic to any medicine. Keep a list of the medicines, vitamins, and herbs you take. Include the amounts, and when and why you take them. Bring the list or the pill bottles to follow-up visits. Carry your medicine list with you in case of an emergency.      Steps to take for signs or symptoms of anaphylaxis:   •Immediately give 1 shot of epinephrine only into the outer thigh muscle.       •Leave the shot in place as directed. Your healthcare provider may recommend you leave it in place for up to 10 seconds before you remove it. This helps make sure all of the epinephrine is delivered.       •Call 911 and go to the emergency department, even if the shot improved symptoms. Do not drive yourself. Bring the used epinephrine shot with you.       Safety precautions to take if you are at risk for anaphylaxis:   •Keep 2 shots of epinephrine with you at all times. You may need a second shot, because epinephrine only works for about 20 minutes and symptoms may return. Your healthcare provider can show you and family members how to give the shot. Check the expiration date every month and replace it before it expires.      •Create an action plan. Your healthcare provider can help you create a written plan that explains the allergy and an emergency plan to treat a reaction. The plan explains when to give a second epinephrine shot if symptoms return or do not improve after the first. Give copies of the action plan and emergency instructions to family members, work and school staff, and  providers. Show them how to give a shot of epinephrine.      •Be careful when you exercise. If you have had exercise-induced anaphylaxis, do not exercise right after you eat. Stop exercising right away if you start to develop any signs or symptoms of anaphylaxis. You may first feel tired, warm, or have itchy skin. Hives, swelling, and severe breathing problems may develop if you continue to exercise.      •Carry medical alert identification. Wear medical alert jewelry or carry a card that explains the allergy. Ask your healthcare provider where to get these items.   Medical Alert Jewelry           •Keep a symptom diary. Include information about how often your symptoms occur, how long they last, and if they are mild or severe. Also keep information on what you ate, what happened, or which medicines you took before the swelling started.       •Avoid triggers. Triggers include foods, medicines, and other things that you know cause symptoms. You may need to see a specialist, such as an allergist or dietitian, to learn what to avoid.      Follow up with your doctor as directed: Write down your questions so you remember to ask them during your visits.

## 2022-07-31 NOTE — ED STATDOCS - OBJECTIVE STATEMENT
60 y/o F with PMHx HTN of presents to the ED with complaints of swollen lip. Hand was swollen, eye was swollen. Pt was put on prednisone and blood pressure alcides. Pt was told to stop prednisone and now takes benadryl. Pt denies any trouble breathing.

## 2022-08-01 ENCOUNTER — APPOINTMENT (OUTPATIENT)
Dept: INTERNAL MEDICINE | Facility: CLINIC | Age: 62
End: 2022-08-01

## 2022-08-01 VITALS
WEIGHT: 147 LBS | HEART RATE: 67 BPM | DIASTOLIC BLOOD PRESSURE: 90 MMHG | HEIGHT: 59 IN | BODY MASS INDEX: 29.64 KG/M2 | RESPIRATION RATE: 16 BRPM | OXYGEN SATURATION: 97 % | TEMPERATURE: 98.3 F | SYSTOLIC BLOOD PRESSURE: 166 MMHG

## 2022-08-01 LAB
T4 FREE SERPL-MCNC: 1.4 NG/DL
TSH SERPL-ACNC: 1.33 UIU/ML

## 2022-08-01 PROCEDURE — 99214 OFFICE O/P EST MOD 30 MIN: CPT

## 2022-08-01 NOTE — ASSESSMENT
[FreeTextEntry1] : 1. allergic reaction, \par H/o upper lip swelling also , Angioedema component?\par Start Prednisone  as directed , take with food\par Continue Benadryl 25 mg po Q 8 hrs PRN, \par Consider  stopping ACE/ARB if lip swelling reoccurs , as may be contributing \par referral to allergy/ immunology MD provided\par Pt will go to ER with Shortness of breath, tongue swelling ,sx's of angio edema  ect.\par 2. knee / joint pain \par Tick panel , TSH obtained \par call/ return as needs, OV with Dr. Rosales as scheduled

## 2022-08-01 NOTE — HISTORY OF PRESENT ILLNESS
[FreeTextEntry8] : Pt presents  to the office today with complaints of " itchy skin for 3 days . " she reports has been having on and off hives and itchy skin for over a year. Sometimes her upper lip swells also. She is unsure what is contributing to it. HAs not taken any new meds or topical products. She was started on Olmsartan for  elevated BP 6 months ago.\par She saw Dr. Hector in the distant past for hives , but did not complete allergy testing . She has a history of chronic urticaria treated with Zyrtec in the past \par She has been taking Benadryl the past 3 days which is making her fatigued but is helping  with the itch. She does  have  a welt on her abdomen and one  on her left forearm.  .She has an EPI pen  at home , has not had to use it. \par She would like a lyme test today, c/ of bilateral knee / joint pain . She was told it was osteoarthritis years ago, but " wants to make sure its not lyme." \par Denies shortness of breath, cough , tongue swelling , palpitations. \par

## 2022-08-01 NOTE — HISTORY OF PRESENT ILLNESS
[FreeTextEntry1] : chronic urticartia [de-identified] : This is a pleasant 61-year-old female with a history of chronic urticaria, with occasional episodes of angioedema (lip swelling), who was seen here on Friday and treated with prednisone and Benadry.  She subsequently went to the emergency room, on Friday and Pepcid p.o. was added.  She then had lip swelling yesterday and was seen in the emergency room and treated with IV Decadron and IV Benadryl.  She currently is taking Benadryl 25 mg p.o. twice daily and Pepcid 20 mg p.o. twice daily.  Her lip swelling has resolved.  She now has no skin urticarial lesions.  She is not having coughing, wheezing, lightheadedness, or swelling of lips tongue or throat. \par \par \par

## 2022-08-01 NOTE — PHYSICAL EXAM
[No Acute Distress] : no acute distress [Well Nourished] : well nourished [Well Developed] : well developed [No Respiratory Distress] : no respiratory distress  [No Accessory Muscle Use] : no accessory muscle use [Clear to Auscultation] : lungs were clear to auscultation bilaterally [Regular Rhythm] : with a regular rhythm [Normal S1, S2] : normal S1 and S2 [Pedal Pulses Present] : the pedal pulses are present [No Extremity Clubbing/Cyanosis] : no extremity clubbing/cyanosis [Coordination Grossly Intact] : coordination grossly intact [No Focal Deficits] : no focal deficits [Normal Gait] : normal gait [Normal Affect] : the affect was normal [Alert and Oriented x3] : oriented to person, place, and time [Normal Insight/Judgement] : insight and judgment were intact [de-identified] : 1 inch raised hive on mid abdomen , 1/2 inch raised hive  on left forearm

## 2022-08-01 NOTE — ASSESSMENT
[FreeTextEntry1] : #1  61-year-old female with history of chronic urticaria.  She had recent urticaria recently, and lip swelling yesterday, treated in ER yesterday with IV Decadron and IV Benadryl.  Has no skin lesions or lip swelling today.  She will continue Benadryl 25 mg p.o. twice daily, Pepcid 20 mg p.o. twice daily, begin Zyrtec 10 mg p.o. daily.  Had higher BP readings in ER 7/29, 7/31, and today.  Repeat 132/78 LA larger cuff today.  To go on mild Decadron taper 2 mg tabs:  1 tablet p.o. twice daily x2 days, then 1 tablet daily x3 days (will stay on no salt, no sugar diet while on steroids).  Will see allergy/immunology Dr Hector tomorrow, who will further assess patient,  address ? if olmesartan needs to be discontinued.  Also, is scheduled to follow with cardiologist this Thursday.

## 2022-08-01 NOTE — PHYSICAL EXAM
[No Acute Distress] : no acute distress [Well Nourished] : well nourished [Well Developed] : well developed [Well-Appearing] : well-appearing [Normal Sclera/Conjunctiva] : normal sclera/conjunctiva [PERRL] : pupils equal round and reactive to light [Normal Outer Ear/Nose] : the outer ears and nose were normal in appearance [Normal Oropharynx] : the oropharynx was normal [No JVD] : no jugular venous distention [No Lymphadenopathy] : no lymphadenopathy [Supple] : supple [Thyroid Normal, No Nodules] : the thyroid was normal and there were no nodules present [No Respiratory Distress] : no respiratory distress  [No Accessory Muscle Use] : no accessory muscle use [Clear to Auscultation] : lungs were clear to auscultation bilaterally [Normal Rate] : normal rate  [Regular Rhythm] : with a regular rhythm [Normal S1, S2] : normal S1 and S2 [No Edema] : there was no peripheral edema [No Extremity Clubbing/Cyanosis] : no extremity clubbing/cyanosis [Soft] : abdomen soft [Non Tender] : non-tender [Non-distended] : non-distended [No HSM] : no HSM [Normal Bowel Sounds] : normal bowel sounds [Normal Anterior Cervical Nodes] : no anterior cervical lymphadenopathy [No Rash] : no rash [Coordination Grossly Intact] : coordination grossly intact [No Focal Deficits] : no focal deficits [Normal Gait] : normal gait [Normal Affect] : the affect was normal [Normal Insight/Judgement] : insight and judgment were intact [de-identified] : no lip swelling

## 2022-08-01 NOTE — REVIEW OF SYSTEMS
[Itching] : Itching [Skin Rash] : skin rash [Negative] : Psychiatric [Fever] : no fever [Chills] : no chills [Fatigue] : no fatigue

## 2022-08-02 ENCOUNTER — NON-APPOINTMENT (OUTPATIENT)
Age: 62
End: 2022-08-02

## 2022-08-02 LAB
A PHAGOCYTOPH IGG TITR SER IF: NORMAL TITER
B BURGDOR AB SER QL IA: NEGATIVE
B MICROTI IGG TITR SER: NORMAL TITER
E CHAFFEENSIS IGG TITR SER IF: NORMAL TITER

## 2022-08-04 ENCOUNTER — NON-APPOINTMENT (OUTPATIENT)
Age: 62
End: 2022-08-04

## 2022-08-04 ENCOUNTER — APPOINTMENT (OUTPATIENT)
Dept: INTERNAL MEDICINE | Facility: CLINIC | Age: 62
End: 2022-08-04

## 2022-08-04 VITALS
HEIGHT: 59 IN | DIASTOLIC BLOOD PRESSURE: 92 MMHG | OXYGEN SATURATION: 97 % | SYSTOLIC BLOOD PRESSURE: 150 MMHG | WEIGHT: 147 LBS | BODY MASS INDEX: 29.64 KG/M2 | RESPIRATION RATE: 16 BRPM | TEMPERATURE: 98.4 F | HEART RATE: 64 BPM

## 2022-08-04 DIAGNOSIS — L50.9 URTICARIA, UNSPECIFIED: ICD-10-CM

## 2022-08-04 DIAGNOSIS — I10 ESSENTIAL (PRIMARY) HYPERTENSION: ICD-10-CM

## 2022-08-04 DIAGNOSIS — T78.3XXA ANGIONEUROTIC EDEMA, INITIAL ENCOUNTER: ICD-10-CM

## 2022-08-04 PROCEDURE — 93000 ELECTROCARDIOGRAM COMPLETE: CPT | Mod: 59

## 2022-08-04 PROCEDURE — 99214 OFFICE O/P EST MOD 30 MIN: CPT | Mod: 25

## 2022-08-04 RX ORDER — OLMESARTAN MEDOXOMIL 40 MG/1
40 TABLET, FILM COATED ORAL DAILY
Qty: 90 | Refills: 3 | Status: DISCONTINUED | COMMUNITY
Start: 2022-01-11 | End: 2022-08-04

## 2022-08-04 RX ORDER — PREDNISONE 20 MG/1
20 TABLET ORAL
Qty: 10 | Refills: 0 | Status: DISCONTINUED | COMMUNITY
Start: 2022-07-29 | End: 2022-08-04

## 2022-08-04 RX ORDER — DEXAMETHASONE 2 MG/1
2 TABLET ORAL
Qty: 7 | Refills: 0 | Status: DISCONTINUED | COMMUNITY
Start: 2022-08-01 | End: 2022-08-04

## 2022-08-04 NOTE — PHYSICAL EXAM
[Normal Oropharynx] : the oropharynx was normal [Normal] : normal rate, regular rhythm, normal S1 and S2 and no murmur heard [Soft] : abdomen soft [Normal Bowel Sounds] : normal bowel sounds [Normal Posterior Cervical Nodes] : no posterior cervical lymphadenopathy [Normal Anterior Cervical Nodes] : no anterior cervical lymphadenopathy

## 2022-08-04 NOTE — DATA REVIEWED
[FreeTextEntry1] : EKG shows sinus rhythm at a rate of 66.  Normal intervals and axis.  There are no acute ST-T wave changes noted.

## 2022-08-04 NOTE — PLAN
[FreeTextEntry1] : 1.  Continue with medication as outlined above.\par \par 2.  Discontinue olmesartan, as this may potentially exacerbate urticaria.\par \par 3.  We will now begin amlodipine 5 mg daily for treatment of her accelerated blood pressure\par \par 4.  The patient will take Allegra 180 mg twice daily\par \par 5.  Pepcid 20 mg twice daily\par \par 6.  The patient will undergo cardiology assessment later today with Dr. Becerra\par \par 7.  The patient will follow up with her allergist, Dr. Hector.  She may potentially be a candidate for Xolair in the future to control her urticaria.  He was given a prescription for an EpiPen as well by her allergist.\par \par 8.  Follow-up with myself in September for her wellness evaluation.  She will undergo lipid liver profile and basic metabolic panel prior to that visit.

## 2022-08-04 NOTE — HISTORY OF PRESENT ILLNESS
[FreeTextEntry8] : Patient comes in today for an acute evaluation.\par \par The patient developed the onset of angioedema last week.  She was seen in the office on 7/29/2022.  She was sent over to the emergency room.  She was given intravenous corticosteroids.  She was sent home on Decadron and tapering doses.  The patient, again developed symptoms earlier this week on 8/1/2022.  She was seen by Dr. Pryor.  He told her to continue with the Decadron taper. \par \par She was then seen on 8/2/2022 by her allergist, Dr. Hector.  She felt, that the patient has chronic urticaria, which has been present for years.  There was concern that possibly the olmesartan, may be contributing.  She is not on any ACE inhibitor's.  She discontinued the olmesartan.  She told the patient to stay on Allegra, Zyrtec, and the Pepcid.  The patient was unable to tolerate Benadryl.  The swelling had resolved completely by the time she was evaluated by the allergist.\par \par The patient now comes in stating that she is unable to tolerate the Allegra, or the Pepcid.  She states that she is extremely jumpy and has an agitated edge to her.  Her stomach is very "nervous."  She has not had any further urticarial lesions on her skin.  Today was her last dose of the Decadron.  She comes in for this assessment

## 2022-08-30 DIAGNOSIS — Z00.00 ENCOUNTER FOR GENERAL ADULT MEDICAL EXAMINATION W/OUT ABNORMAL FINDINGS: ICD-10-CM

## 2022-09-03 ENCOUNTER — LABORATORY RESULT (OUTPATIENT)
Age: 62
End: 2022-09-03

## 2022-09-06 ENCOUNTER — RX CHANGE (OUTPATIENT)
Age: 62
End: 2022-09-06

## 2022-09-13 ENCOUNTER — APPOINTMENT (OUTPATIENT)
Dept: INTERNAL MEDICINE | Facility: CLINIC | Age: 62
End: 2022-09-13

## 2022-09-13 VITALS
HEART RATE: 66 BPM | RESPIRATION RATE: 16 BRPM | BODY MASS INDEX: 29.03 KG/M2 | TEMPERATURE: 97.9 F | WEIGHT: 144 LBS | DIASTOLIC BLOOD PRESSURE: 84 MMHG | SYSTOLIC BLOOD PRESSURE: 146 MMHG | OXYGEN SATURATION: 96 % | HEIGHT: 59 IN

## 2022-09-13 DIAGNOSIS — F41.9 ANXIETY DISORDER, UNSPECIFIED: ICD-10-CM

## 2022-09-13 PROBLEM — Z00.00 ENCOUNTER FOR WELLNESS EXAMINATION IN ADULT: Status: ACTIVE | Noted: 2019-04-23

## 2022-09-13 PROCEDURE — 99396 PREV VISIT EST AGE 40-64: CPT | Mod: 25

## 2022-09-13 PROCEDURE — 99214 OFFICE O/P EST MOD 30 MIN: CPT | Mod: 25

## 2022-09-13 RX ORDER — METHOCARBAMOL 500 MG/1
500 TABLET, FILM COATED ORAL
Qty: 30 | Refills: 0 | Status: DISCONTINUED | COMMUNITY
Start: 2022-01-11 | End: 2022-09-13

## 2022-09-13 RX ORDER — FAMOTIDINE 20 MG/1
20 TABLET, FILM COATED ORAL TWICE DAILY
Qty: 60 | Refills: 11 | Status: DISCONTINUED | COMMUNITY
Start: 2022-07-29 | End: 2022-09-13

## 2022-09-13 RX ORDER — PAROXETINE HYDROCHLORIDE 40 MG/1
40 TABLET, FILM COATED ORAL DAILY
Qty: 30 | Refills: 11 | Status: ACTIVE | COMMUNITY
Start: 2022-08-30 | End: 1900-01-01

## 2022-09-13 NOTE — HISTORY OF PRESENT ILLNESS
[FreeTextEntry1] : The patient presents for a comprehensive assessment.  [de-identified] : The patient reports that she has been very anxious and depressed as of recently. Her job has been very stressful and she has been crying often. She does not currently have a psychiatrist. She previously had a psychiatrist, and none of the medications helped, except for Paxil 30 mg. In the past.  She stopped Paxil on her own because she was doing well, but she has now restarted it on her own, due to the recurrence of symptoms noted above.  I have talked to her in the past about undergoing psychiatric intervention, but she has not followed through in this regard.  She also feels, as if she is not sleeping very well, and as a result, she has significant daytime fatigue.. \par \par The patient has a history of angioedema. She sees Dr. Hector, who placed her on multiple medications, which she is not taking. She only takes Allegra as needed, if she develops a flareup.. She has not had an exacerbation, over the past several weeks.  He refuses to take many of the maintenance medications due to the side effects.\par \par She is maintained on Amlodipine for her HTN. She tends to check her blood pressure at home; the systolic pressure ranges from 130-160 and the diastolic is usually from 80-90. She is followed by her cardiologist,  Dr. Becerra. She is scheduled to undergo a stress test on September 29, 2022. Denies chest pain or chest tightness. \par \par She has an appointment with her GYN for an annual assessment. \par \par She had the COVID-19 vaccines and 2x boosters. She has not had the Shingrix vaccines. \par \par No other complaints at this time.

## 2022-09-13 NOTE — PHYSICAL EXAM
[Well Nourished] : well nourished [Well Developed] : well developed [Well-Appearing] : well-appearing [Normal Sclera/Conjunctiva] : normal sclera/conjunctiva [EOMI] : extraocular movements intact [Normal Outer Ear/Nose] : the outer ears and nose were normal in appearance [Normal Oropharynx] : the oropharynx was normal [No JVD] : no jugular venous distention [Supple] : supple [Thyroid Normal, No Nodules] : the thyroid was normal and there were no nodules present [No Respiratory Distress] : no respiratory distress  [No Accessory Muscle Use] : no accessory muscle use [Clear to Auscultation] : lungs were clear to auscultation bilaterally [Normal Rate] : normal rate  [Regular Rhythm] : with a regular rhythm [Normal S1, S2] : normal S1 and S2 [No Murmur] : no murmur heard [No Edema] : there was no peripheral edema [No Extremity Clubbing/Cyanosis] : no extremity clubbing/cyanosis [Soft] : abdomen soft [Non Tender] : non-tender [Non-distended] : non-distended [No Masses] : no abdominal mass palpated [No HSM] : no HSM [Normal Bowel Sounds] : normal bowel sounds [Normal Posterior Cervical Nodes] : no posterior cervical lymphadenopathy [Normal Anterior Cervical Nodes] : no anterior cervical lymphadenopathy [No CVA Tenderness] : no CVA  tenderness [No Spinal Tenderness] : no spinal tenderness [No Joint Swelling] : no joint swelling [No Rash] : no rash [Coordination Grossly Intact] : coordination grossly intact [Normal Gait] : normal gait [Normal Affect] : the affect was normal [Normal Insight/Judgement] : insight and judgment were intact [Normal Supraclavicular Nodes] : no supraclavicular lymphadenopathy [de-identified] : midline sx scar [de-identified] : There is slight swelling possibly representing a partially torn quadriceps posteriorly in the left upper outer thigh region. It is tender to palpation. There is no significant warmth.

## 2022-09-13 NOTE — PLAN
[FreeTextEntry1] : 1. I advised her to see a psychiatrist for her depression and anxiety. I referred her to Dr. Alyssa Barker and Dr. Burton Hoang. \par \par 2. We will increase her Paxil t 40 mg daily. \par \par 3. Exercise regularly\par \par 4. Continue medications as outlined above. \par \par 5. Follow up with cardiology for a stress test later this month.  Antihypertensive medications will be adjusted as needed.\par \par 6. I recommended she complete another COVID-19 booster in the fall. \par \par 7. Follow up with GYN for routine assessment. \par \par 8. Follow up in 6 months for a pulmonary function test and yearly blood work.

## 2022-09-20 ENCOUNTER — NON-APPOINTMENT (OUTPATIENT)
Age: 62
End: 2022-09-20

## 2022-09-20 ENCOUNTER — FORM ENCOUNTER (OUTPATIENT)
Age: 62
End: 2022-09-20

## 2022-09-30 ENCOUNTER — RX CHANGE (OUTPATIENT)
Age: 62
End: 2022-09-30

## 2022-10-13 ENCOUNTER — APPOINTMENT (OUTPATIENT)
Dept: OBGYN | Facility: CLINIC | Age: 62
End: 2022-10-13

## 2022-10-13 VITALS
BODY MASS INDEX: 29.84 KG/M2 | WEIGHT: 148 LBS | SYSTOLIC BLOOD PRESSURE: 122 MMHG | HEIGHT: 59 IN | DIASTOLIC BLOOD PRESSURE: 78 MMHG

## 2022-10-13 DIAGNOSIS — M85.80 OTHER SPECIFIED DISORDERS OF BONE DENSITY AND STRUCTURE, UNSPECIFIED SITE: ICD-10-CM

## 2022-10-13 DIAGNOSIS — Z12.31 ENCOUNTER FOR SCREENING MAMMOGRAM FOR MALIGNANT NEOPLASM OF BREAST: ICD-10-CM

## 2022-10-13 DIAGNOSIS — Z01.419 ENCOUNTER FOR GYNECOLOGICAL EXAMINATION (GENERAL) (ROUTINE) W/OUT ABNORMAL FINDINGS: ICD-10-CM

## 2022-10-13 PROCEDURE — 99396 PREV VISIT EST AGE 40-64: CPT

## 2022-10-13 PROCEDURE — 99213 OFFICE O/P EST LOW 20 MIN: CPT | Mod: 25

## 2022-10-13 NOTE — HISTORY OF PRESENT ILLNESS
[No] : Patient does not have concerns regarding sex [Currently Active] : currently active [Men] : men [Vaginal] : vaginal [TextBox_4] : No vb takes vit d, exercising\par Being tx for anxiety\par has night sweats\par not exercising, takes vit d\par rlq pain over past 4-5 months. Hot compress helps\par Has fishy odor, no urinary symptoms, no vaginal dc [Mammogramdate] : 3/2021 [PapSmeardate] : 3/2021 [BoneDensityDate] : 8/31/19 [TextBox_37] : fu 2-3 yrs [ColonoscopyDate] : 4 yrs ago

## 2022-10-16 LAB
CANDIDA VAG CYTO: NOT DETECTED
G VAGINALIS+PREV SP MTYP VAG QL MICRO: DETECTED
T VAGINALIS VAG QL WET PREP: NOT DETECTED

## 2022-10-17 ENCOUNTER — NON-APPOINTMENT (OUTPATIENT)
Age: 62
End: 2022-10-17

## 2022-10-17 DIAGNOSIS — B96.89 ACUTE VAGINITIS: ICD-10-CM

## 2022-10-17 DIAGNOSIS — N76.0 ACUTE VAGINITIS: ICD-10-CM

## 2022-11-03 ENCOUNTER — TRANSCRIPTION ENCOUNTER (OUTPATIENT)
Age: 62
End: 2022-11-03

## 2022-11-07 ENCOUNTER — NON-APPOINTMENT (OUTPATIENT)
Age: 62
End: 2022-11-07

## 2022-11-07 NOTE — ED STATDOCS - ATTENDING SHARED VISIT SELECTORS
PROCEDURES:  CABG, with RODRIGUEZ 07-Nov-2022 21:08:53 CABGx3 (LIMA-LAD, SVG-OM, SVG-PDA) Mame Jurado D   History/Exam/Medical Decision Making

## 2022-11-11 ENCOUNTER — NON-APPOINTMENT (OUTPATIENT)
Age: 62
End: 2022-11-11

## 2022-12-07 ENCOUNTER — NON-APPOINTMENT (OUTPATIENT)
Age: 62
End: 2022-12-07

## 2022-12-13 NOTE — ED STATDOCS - CPE ED CARDIAC NORM
Medications:  Continuous Infusions:  Scheduled Meds:   aspirin  81 mg Oral Daily    atorvastatin  80 mg Oral Daily    gabapentin  800 mg Oral TID    pantoprazole  40 mg Oral Daily    rivaroxaban  20 mg Oral Daily with dinner     PRN Meds:acetaminophen, acetaminophen, hydrALAZINE, influenza 65up-adj, LIDOcaine (PF) 10 mg/ml (1%), melatonin, ondansetron, oxyCODONE, sodium chloride 0.9%     Objective:     Vital Signs (Most Recent):  Temp: 97.4 °F (36.3 °C) (12/12/22 0329)  Pulse: 86 (12/12/22 0329)  Resp: 16 (12/12/22 0329)  BP: (!) 124/58 (12/12/22 0329)  SpO2: (!) 94 % (12/12/22 0329)   Vital Signs (24h Range):  Temp:  [97.4 °F (36.3 °C)-98.6 °F (37 °C)] 97.4 °F (36.3 °C)  Pulse:  [77-88] 86  Resp:  [14-20] 16  SpO2:  [94 %-98 %] 94 %  BP: (124-163)/(58-69) 124/58         Physical Exam  Vitals reviewed.   Constitutional:       General: He is not in acute distress.     Appearance: Normal appearance. He is not ill-appearing.   Pulmonary:      Effort: Pulmonary effort is normal. No respiratory distress.   Abdominal:      General: Abdomen is flat.      Palpations: Abdomen is soft.   Skin:     General: Skin is warm and dry.   Neurological:      Mental Status: He is alert and oriented to person, place, and time.       Significant Labs:  CBC:   Recent Labs   Lab 12/11/22  0545   WBC 9.90   RBC 3.47*   HGB 8.0*   HCT 27.1*      MCV 78*   MCH 23.1*   MCHC 29.5*     CMP:   Recent Labs   Lab 12/11/22  0545   GLU 95   CALCIUM 8.9      K 4.0   CO2 24      BUN 10   CREATININE 0.8     All pertinent labs from the last 24 hours have been reviewed.    Significant Diagnostics:  I have reviewed and interpreted all pertinent imaging results/findings within the past 24 hours.  
  Medications:  Continuous Infusions:  Scheduled Meds:   aspirin  81 mg Oral Daily    atorvastatin  80 mg Oral Daily    gabapentin  800 mg Oral TID    pantoprazole  40 mg Oral Daily    rivaroxaban  20 mg Oral Daily with dinner     PRN Meds:acetaminophen, acetaminophen, hydrALAZINE, influenza 65up-adj, LIDOcaine (PF) 10 mg/ml (1%), melatonin, ondansetron, oxyCODONE, sodium chloride 0.9%     Objective:     Vital Signs (Most Recent):  Temp: 98 °F (36.7 °C) (12/13/22 0430)  Pulse: 80 (12/13/22 0430)  Resp: 20 (12/13/22 0430)  BP: (!) 144/67 (12/13/22 0430)  SpO2: 97 % (12/13/22 0430)   Vital Signs (24h Range):  Temp:  [97 °F (36.1 °C)-98.8 °F (37.1 °C)] 98 °F (36.7 °C)  Pulse:  [80-93] 80  Resp:  [16-20] 20  SpO2:  [95 %-98 %] 97 %  BP: (141-168)/(66-72) 144/67         Physical Exam  Vitals reviewed.   Constitutional:       General: He is not in acute distress.     Appearance: Normal appearance. He is not ill-appearing.   Pulmonary:      Effort: Pulmonary effort is normal. No respiratory distress.   Abdominal:      General: Abdomen is flat.      Palpations: Abdomen is soft.   Skin:     General: Skin is warm and dry.   Neurological:      Mental Status: He is alert and oriented to person, place, and time.       Significant Labs:  CBC:   Recent Labs   Lab 12/12/22  0907   WBC 10.74   RBC 3.64*   HGB 8.4*   HCT 28.2*      MCV 78*   MCH 23.1*   MCHC 29.8*     CMP:   Recent Labs   Lab 12/12/22  0907   *   CALCIUM 8.9      K 3.9   CO2 24      BUN 12   CREATININE 0.8       Significant Diagnostics:  I have reviewed all pertinent imaging results/findings within the past 24 hours.  
  Medications:  Continuous Infusions:  Scheduled Meds:   aspirin  81 mg Oral Daily    atorvastatin  80 mg Oral Daily    gabapentin  800 mg Oral TID    rivaroxaban  20 mg Oral Daily with dinner     PRN Meds:acetaminophen, acetaminophen, hydrALAZINE, LIDOcaine (PF) 10 mg/ml (1%), melatonin, ondansetron, oxyCODONE, sodium chloride 0.9%     Objective:     Vital Signs (Most Recent):  Temp: 98.5 °F (36.9 °C) (12/11/22 0730)  Pulse: 77 (12/11/22 0730)  Resp: 14 (12/11/22 0730)  BP: (!) 145/67 (12/11/22 0730)  SpO2: 98 % (12/11/22 0730)   Vital Signs (24h Range):  Temp:  [97.7 °F (36.5 °C)-98.5 °F (36.9 °C)] 98.5 °F (36.9 °C)  Pulse:  [77-89] 77  Resp:  [14-19] 14  SpO2:  [96 %-98 %] 98 %  BP: (143-168)/(64-76) 145/67     Date 12/11/22 0700 - 12/12/22 0659   Shift 3576-9954 9249-7174 5926-0533 24 Hour Total   INTAKE   Shift Total(mL/kg)       OUTPUT   Urine(mL/kg/hr) 350   350   Shift Total(mL/kg) 350(5.5)   350(5.5)   Weight (kg) 64 64 64 64       Physical Exam  Cardiovascular:      Rate and Rhythm: Normal rate.      Pulses: Normal pulses.   Pulmonary:      Effort: Pulmonary effort is normal.   Abdominal:      General: Abdomen is flat.      Palpations: Abdomen is soft.   Musculoskeletal:         General: Normal range of motion.   Skin:     General: Skin is warm.      Comments: RLE wound with wound vac in place - good suction/seal      Neurological:      Mental Status: He is alert.       Significant Labs:  CBC:   Recent Labs   Lab 12/11/22  0545   WBC 9.90   RBC 3.47*   HGB 8.0*   HCT 27.1*      MCV 78*   MCH 23.1*   MCHC 29.5*     CMP:   Recent Labs   Lab 12/11/22  0545   GLU 95   CALCIUM 8.9      K 4.0   CO2 24      BUN 10   CREATININE 0.8       Significant Diagnostics:  I have reviewed all pertinent imaging results/findings within the past 24 hours.  
  Medications:  Continuous Infusions:  Scheduled Meds:   aspirin  81 mg Oral Daily    atorvastatin  80 mg Oral Daily    rivaroxaban  20 mg Oral Daily with dinner     PRN Meds:acetaminophen, acetaminophen, hydrALAZINE, LIDOcaine (PF) 10 mg/ml (1%), melatonin, ondansetron, oxyCODONE, sodium chloride 0.9%     Objective:     Vital Signs (Most Recent):  Temp: 97.5 °F (36.4 °C) (12/10/22 0727)  Pulse: 83 (12/10/22 0727)  Resp: 14 (12/10/22 0727)  BP: (!) 162/76 (12/10/22 0727)  SpO2: 96 % (12/10/22 0727)   Vital Signs (24h Range):  Temp:  [97.5 °F (36.4 °C)-98.1 °F (36.7 °C)] 97.5 °F (36.4 °C)  Pulse:  [77-85] 83  Resp:  [14-18] 14  SpO2:  [95 %-98 %] 96 %  BP: (137-175)/(63-79) 162/76     Date 12/10/22 0700 - 12/11/22 0659   Shift 7957-5989 8881-8621 4103-6122 24 Hour Total   INTAKE   Shift Total(mL/kg)       OUTPUT   Urine(mL/kg/hr) 225   225   Shift Total(mL/kg) 225(3.5)   225(3.5)   Weight (kg) 64 64 64 64       Physical Exam  Cardiovascular:      Rate and Rhythm: Normal rate.      Pulses: Normal pulses.   Pulmonary:      Effort: Pulmonary effort is normal.   Abdominal:      General: Abdomen is flat.      Palpations: Abdomen is soft.   Musculoskeletal:         General: Normal range of motion.   Skin:     General: Skin is warm.      Comments: RLE wound with wound vac in place - good suction/seal      Neurological:      Mental Status: He is alert.       Significant Labs:  CBC:   Recent Labs   Lab 12/10/22  0521   WBC 9.52   RBC 3.27*   HGB 7.6*   HCT 25.5*      MCV 78*   MCH 23.2*   MCHC 29.8*     CMP:   Recent Labs   Lab 12/10/22  0521   GLU 91   CALCIUM 8.5*      K 3.7   CO2 23      BUN 9   CREATININE 0.7       Significant Diagnostics:  I have reviewed all pertinent imaging results/findings within the past 24 hours.  
normal...

## 2023-02-14 ENCOUNTER — NON-APPOINTMENT (OUTPATIENT)
Age: 63
End: 2023-02-14

## 2023-02-14 ENCOUNTER — APPOINTMENT (OUTPATIENT)
Dept: INTERNAL MEDICINE | Facility: CLINIC | Age: 63
End: 2023-02-14
Payer: COMMERCIAL

## 2023-02-14 VITALS
SYSTOLIC BLOOD PRESSURE: 120 MMHG | WEIGHT: 161 LBS | RESPIRATION RATE: 16 BRPM | BODY MASS INDEX: 32.46 KG/M2 | TEMPERATURE: 99 F | OXYGEN SATURATION: 97 % | HEIGHT: 59 IN | DIASTOLIC BLOOD PRESSURE: 80 MMHG | HEART RATE: 80 BPM

## 2023-02-14 DIAGNOSIS — Z01.818 ENCOUNTER FOR OTHER PREPROCEDURAL EXAMINATION: ICD-10-CM

## 2023-02-14 DIAGNOSIS — K40.90 UNILATERAL INGUINAL HERNIA, W/OUT OBSTRUCTION OR GANGRENE, NOT SPECIFIED AS RECURRENT: ICD-10-CM

## 2023-02-14 DIAGNOSIS — Z87.891 PERSONAL HISTORY OF NICOTINE DEPENDENCE: ICD-10-CM

## 2023-02-14 PROCEDURE — 99214 OFFICE O/P EST MOD 30 MIN: CPT | Mod: 25

## 2023-02-14 PROCEDURE — 93005 ELECTROCARDIOGRAM TRACING: CPT | Mod: 59

## 2023-02-14 NOTE — PHYSICAL EXAM
[No Acute Distress] : no acute distress [Well-Appearing] : well-appearing [No Lymphadenopathy] : no lymphadenopathy [Supple] : supple [No Respiratory Distress] : no respiratory distress  [No Accessory Muscle Use] : no accessory muscle use [Clear to Auscultation] : lungs were clear to auscultation bilaterally [Normal Rate] : normal rate  [Regular Rhythm] : with a regular rhythm [Normal S1, S2] : normal S1 and S2 [No Edema] : there was no peripheral edema [Normal Gait] : normal gait [Normal Affect] : the affect was normal [Alert and Oriented x3] : oriented to person, place, and time

## 2023-02-16 ENCOUNTER — NON-APPOINTMENT (OUTPATIENT)
Age: 63
End: 2023-02-16

## 2023-02-16 LAB
ALBUMIN SERPL ELPH-MCNC: 4.7 G/DL
ALP BLD-CCNC: 50 U/L
ALT SERPL-CCNC: 17 U/L
ANION GAP SERPL CALC-SCNC: 14 MMOL/L
AST SERPL-CCNC: 17 U/L
BASOPHILS # BLD AUTO: 0.03 K/UL
BASOPHILS NFR BLD AUTO: 0.4 %
BILIRUB SERPL-MCNC: 0.4 MG/DL
BUN SERPL-MCNC: 16 MG/DL
CALCIUM SERPL-MCNC: 9.8 MG/DL
CHLORIDE SERPL-SCNC: 104 MMOL/L
CO2 SERPL-SCNC: 26 MMOL/L
CREAT SERPL-MCNC: 0.89 MG/DL
EGFR: 72 ML/MIN/1.73M2
EOSINOPHIL # BLD AUTO: 0.22 K/UL
EOSINOPHIL NFR BLD AUTO: 2.9 %
GLUCOSE SERPL-MCNC: 120 MG/DL
HCT VFR BLD CALC: 41.5 %
HGB BLD-MCNC: 13.3 G/DL
IMM GRANULOCYTES NFR BLD AUTO: 0.3 %
INR PPP: 1 RATIO
LYMPHOCYTES # BLD AUTO: 1.41 K/UL
LYMPHOCYTES NFR BLD AUTO: 18.8 %
MAN DIFF?: NORMAL
MCHC RBC-ENTMCNC: 27.9 PG
MCHC RBC-ENTMCNC: 32 GM/DL
MCV RBC AUTO: 87.2 FL
MONOCYTES # BLD AUTO: 0.57 K/UL
MONOCYTES NFR BLD AUTO: 7.6 %
NEUTROPHILS # BLD AUTO: 5.27 K/UL
NEUTROPHILS NFR BLD AUTO: 70 %
PLATELET # BLD AUTO: 226 K/UL
POTASSIUM SERPL-SCNC: 4.4 MMOL/L
PROT SERPL-MCNC: 6.9 G/DL
PT BLD: 11.6 SEC
RBC # BLD: 4.76 M/UL
RBC # FLD: 14.1 %
SODIUM SERPL-SCNC: 144 MMOL/L
WBC # FLD AUTO: 7.52 K/UL

## 2023-02-16 NOTE — HISTORY OF PRESENT ILLNESS
[Self] : previous adverse anesthesia reaction [(Patient denies any chest pain, claudication, dyspnea on exertion, orthopnea, palpitations or syncope)] : Patient denies any chest pain, claudication, dyspnea on exertion, orthopnea, palpitations or syncope [Aortic Stenosis] : no aortic stenosis [Atrial Fibrillation] : no atrial fibrillation [Coronary Artery Disease] : no coronary artery disease [Recent Myocardial Infarction] : no recent myocardial infarction [Implantable Device/Pacemaker] : no implantable device/pacemaker [Asthma] : no asthma [COPD] : no COPD [Sleep Apnea] : no sleep apnea [Smoker] : not a smoker [Family Member] : no family member with adverse anesthesia reaction/sudden death [Chronic Anticoagulation] : no chronic anticoagulation [Chronic Kidney Disease] : no chronic kidney disease [Diabetes] : no diabetes [FreeTextEntry1] : R inguinal hernia repair with mesh  [FreeTextEntry2] : 2/21/23 [FreeTextEntry3] : Dr. Mcneil  [FreeTextEntry4] : Patient is a 61- year-old female with PMH of anxiety, GERD, COPD, HTN, LBP who presents for [facility] for R inguinal hernia repair on 2/21/23 with Dr. Mcneil at [facility].\par \par Patient state shes has pain in lower abd quadrants. Does occasionally do heavy lifting at work. \par \par Patient feels well today, no acute complaints \par \par Patient states she feels well today, no acute complaints  [FreeTextEntry5] : Reprots history of nausea with previous surgery/ anesthesia  [FreeTextEntry7] : Dr. Becerra - last seen 2 months

## 2023-02-16 NOTE — PLAN
[FreeTextEntry1] : 1. The patient will hold all ASA, tylenol, motrin, vitamins and supplements 7 days prior to surgery\par \par 2. Close airway monitoring with pulse oximetry\par \par 3. PST labs WNL\par \par 4. Patient medically cleared for procedure\par \par 5. DVT prophy as per surgery\par \par 6. F/U 1 month for CPE or sooner if needed

## 2023-03-30 ENCOUNTER — RX RENEWAL (OUTPATIENT)
Age: 63
End: 2023-03-30

## 2023-03-30 RX ORDER — FENOFIBRIC ACID 45 MG/1
45 CAPSULE, DELAYED RELEASE ORAL
Qty: 90 | Refills: 1 | Status: ACTIVE | COMMUNITY
Start: 2017-06-21 | End: 1900-01-01

## 2023-04-05 NOTE — ED STATDOCS - PATIENT PORTAL LINK FT
Improved You can access the FollowMyHealth Patient Portal offered by [facility] by registering at the following website: [email or web address]. By joining [facility]’s FollowMyHealth portal, you will also be able to view your health information using other applications (apps) compatible with our system.

## 2023-06-06 ENCOUNTER — APPOINTMENT (OUTPATIENT)
Dept: INTERNAL MEDICINE | Facility: CLINIC | Age: 63
End: 2023-06-06

## 2023-06-16 ENCOUNTER — NON-APPOINTMENT (OUTPATIENT)
Age: 63
End: 2023-06-16

## 2023-06-21 ENCOUNTER — APPOINTMENT (OUTPATIENT)
Dept: GASTROENTEROLOGY | Facility: CLINIC | Age: 63
End: 2023-06-21

## 2023-07-18 ENCOUNTER — APPOINTMENT (OUTPATIENT)
Dept: INTERNAL MEDICINE | Facility: CLINIC | Age: 63
End: 2023-07-18

## 2023-08-03 ENCOUNTER — APPOINTMENT (OUTPATIENT)
Dept: INTERNAL MEDICINE | Facility: CLINIC | Age: 63
End: 2023-08-03

## 2023-08-03 NOTE — ED ADULT NURSE NOTE - HIV OFFER
Pt has a very sore throat, swollen and his ears hurt. He is going to go to Urgent care in [place] to be tested for strep. Opt out

## 2023-08-04 ENCOUNTER — NON-APPOINTMENT (OUTPATIENT)
Age: 63
End: 2023-08-04

## 2023-08-23 ENCOUNTER — APPOINTMENT (OUTPATIENT)
Dept: INTERNAL MEDICINE | Facility: CLINIC | Age: 63
End: 2023-08-23
Payer: COMMERCIAL

## 2023-08-23 VITALS
HEIGHT: 59 IN | WEIGHT: 145 LBS | OXYGEN SATURATION: 99 % | SYSTOLIC BLOOD PRESSURE: 146 MMHG | BODY MASS INDEX: 29.23 KG/M2 | DIASTOLIC BLOOD PRESSURE: 94 MMHG | TEMPERATURE: 97.7 F | HEART RATE: 71 BPM

## 2023-08-23 DIAGNOSIS — Z23 ENCOUNTER FOR IMMUNIZATION: ICD-10-CM

## 2023-08-23 DIAGNOSIS — Z00.00 ENCOUNTER FOR GENERAL ADULT MEDICAL EXAMINATION W/OUT ABNORMAL FINDINGS: ICD-10-CM

## 2023-08-23 DIAGNOSIS — F32.A DEPRESSION, UNSPECIFIED: ICD-10-CM

## 2023-08-23 PROCEDURE — 99396 PREV VISIT EST AGE 40-64: CPT

## 2023-08-23 RX ORDER — AMLODIPINE BESYLATE 5 MG/1
5 TABLET ORAL
Qty: 90 | Refills: 3 | Status: DISCONTINUED | COMMUNITY
Start: 2022-08-04 | End: 2023-08-23

## 2023-08-23 RX ORDER — CETIRIZINE HYDROCHLORIDE 10 MG/1
10 TABLET, FILM COATED ORAL
Qty: 60 | Refills: 0 | Status: DISCONTINUED | COMMUNITY
Start: 2022-08-01 | End: 2023-08-23

## 2023-08-23 RX ORDER — METRONIDAZOLE 7.5 MG/G
0.75 GEL VAGINAL
Qty: 1 | Refills: 0 | Status: DISCONTINUED | COMMUNITY
Start: 2022-10-17 | End: 2023-08-23

## 2023-08-23 NOTE — REVIEW OF SYSTEMS
[Fever] : no fever [Chills] : no chills [Fatigue] : no fatigue [Night Sweats] : no night sweats [Recent Change In Weight] : ~T recent weight change [Chest Pain] : no chest pain [Palpitations] : no palpitations [Lower Ext Edema] : no lower extremity edema [Orthopnea] : no orthopnea [Paroxysmal Nocturnal Dyspnea] : no paroxysmal nocturnal dyspnea [Heartburn] : heartburn [Dysuria] : no dysuria [Hematuria] : no hematuria [Joint Pain] : no joint pain [Muscle Pain] : no muscle pain [Itching] : no itching [Skin Rash] : no skin rash [Headache] : no headache [Dizziness] : no dizziness [Unsteady Walking] : no ataxia [Insomnia] : no insomnia [Anxiety] : anxiety [Depression] : depression [Easy Bleeding] : no easy bleeding [Easy Bruising] : no easy bruising [Swollen Glands] : no swollen glands [Negative] : Gastrointestinal [FreeTextEntry2] : 16-pound weight loss

## 2023-08-23 NOTE — PHYSICAL EXAM
[No Acute Distress] : no acute distress [Normal Sclera/Conjunctiva] : normal sclera/conjunctiva [EOMI] : extraocular movements intact [Normal Outer Ear/Nose] : the outer ears and nose were normal in appearance [Normal Oropharynx] : the oropharynx was normal [Normal TMs] : both tympanic membranes were normal [Normal Nasal Mucosa] : the nasal mucosa was normal [No JVD] : no jugular venous distention [No Lymphadenopathy] : no lymphadenopathy [Supple] : supple [Thyroid Normal, No Nodules] : the thyroid was normal and there were no nodules present [No Respiratory Distress] : no respiratory distress  [No Accessory Muscle Use] : no accessory muscle use [Clear to Auscultation] : lungs were clear to auscultation bilaterally [Normal Rate] : normal rate  [Regular Rhythm] : with a regular rhythm [Normal S1, S2] : normal S1 and S2 [No Edema] : there was no peripheral edema [No Extremity Clubbing/Cyanosis] : no extremity clubbing/cyanosis [Soft] : abdomen soft [Non Tender] : non-tender [Non-distended] : non-distended [No HSM] : no HSM [Normal Bowel Sounds] : normal bowel sounds [Normal Anterior Cervical Nodes] : no anterior cervical lymphadenopathy [No CVA Tenderness] : no CVA  tenderness [No Joint Swelling] : no joint swelling [No Rash] : no rash [No Focal Deficits] : no focal deficits [Normal Gait] : normal gait [Normal Affect] : the affect was normal [Alert and Oriented x3] : oriented to person, place, and time [Normal Insight/Judgement] : insight and judgment were intact

## 2023-08-23 NOTE — HEALTH RISK ASSESSMENT
[Never (0 pts)] : Never (0 points) [No] : In the past 12 months have you used drugs other than those required for medical reasons? No [0] : 2) Feeling down, depressed, or hopeless: Not at all (0) [Never] : Never

## 2023-08-23 NOTE — HISTORY OF PRESENT ILLNESS
[FreeTextEntry1] : CPE [de-identified] : 62F w/ PMHx of Depression, Anxiety, HTN, HLD, Angioedema, and GERD presents to the office for an annual wellness examination.  The patient reports feeling well, she underwent repair of a right inguinal hernia in February, there were no postoperative complications.  The patient has been following with a Psych NP for the management of her depression and anxiety, she is maintained on Paxil 40mg daily.  The patient had a cardiac stress test 1 year ago with Dr. Becerra that was reportedly normal. She still struggles with management of her blood pressure. She is prescribed Amlodipine 5mg daily and Metoprolol Tartrate 50mg daily. Her BP readings at home are -240 and DBP 80s-105. She eats a no added salt diet and watches her fat & sugar intake. She does not exercise much. She has lost 16 pounds since February.   Pt reports vaginal pressure, she has an appointment with her GYN 8/31/23. She denies vaginal bleeding or abnormal vaginal discharge. She is up to date with her mammography and Dexa scans.   The patient underwent and upper endoscopy and colonoscopy last month with her GI physician, she was diagnosed and treated for H. pylori. She has repeat testing in a few weeks.   The patient is up to date with dental exams, she is overdue for ophthalmology and dermatology exams.   The pt denies smoking or illicit drug use. She rarely drinks EtOH, caffeine intake is 2-3 cups a day.  Labs have been reviewed at the visit.

## 2023-08-28 ENCOUNTER — TRANSCRIPTION ENCOUNTER (OUTPATIENT)
Age: 63
End: 2023-08-28

## 2023-08-31 ENCOUNTER — APPOINTMENT (OUTPATIENT)
Dept: OBGYN | Facility: CLINIC | Age: 63
End: 2023-08-31

## 2023-08-31 ENCOUNTER — APPOINTMENT (OUTPATIENT)
Dept: OBGYN | Facility: CLINIC | Age: 63
End: 2023-08-31
Payer: COMMERCIAL

## 2023-08-31 VITALS
HEIGHT: 59 IN | SYSTOLIC BLOOD PRESSURE: 160 MMHG | WEIGHT: 148 LBS | DIASTOLIC BLOOD PRESSURE: 90 MMHG | BODY MASS INDEX: 29.84 KG/M2

## 2023-08-31 DIAGNOSIS — N83.8 OTHER NONINFLAMMATORY DISORDERS OF OVARY, FALLOPIAN TUBE AND BROAD LIGAMENT: ICD-10-CM

## 2023-08-31 DIAGNOSIS — Z82.49 FAMILY HISTORY OF ISCHEMIC HEART DISEASE AND OTHER DISEASES OF THE CIRCULATORY SYSTEM: ICD-10-CM

## 2023-08-31 DIAGNOSIS — R10.2 PELVIC AND PERINEAL PAIN: ICD-10-CM

## 2023-08-31 LAB
BILIRUB UR QL STRIP: NORMAL
CLARITY UR: CLEAR
COLLECTION METHOD: NORMAL
GLUCOSE UR-MCNC: NORMAL
HCG UR QL: 0.2 EU/DL
HGB UR QL STRIP.AUTO: NORMAL
KETONES UR-MCNC: NORMAL
LEUKOCYTE ESTERASE UR QL STRIP: NORMAL
NITRITE UR QL STRIP: NORMAL
PH UR STRIP: 6
PROT UR STRIP-MCNC: NORMAL
SP GR UR STRIP: 1.02

## 2023-08-31 PROCEDURE — 81003 URINALYSIS AUTO W/O SCOPE: CPT | Mod: QW

## 2023-08-31 PROCEDURE — 99214 OFFICE O/P EST MOD 30 MIN: CPT

## 2023-08-31 NOTE — PHYSICAL EXAM
Manually Hyperlinked DM Eye Exam from outside Facility      [Normal] : uterus [Labia Majora] : labia major [Labia Minora] : labia minora [Enlarged] : was enlarged [Adnexa Tenderness On The Right] : was tender to palpation [___] : no mass was palpated in the right adnexa

## 2023-09-02 LAB
CANDIDA VAG CYTO: NOT DETECTED
G VAGINALIS+PREV SP MTYP VAG QL MICRO: NOT DETECTED
T VAGINALIS VAG QL WET PREP: NOT DETECTED

## 2023-09-05 ENCOUNTER — NON-APPOINTMENT (OUTPATIENT)
Age: 63
End: 2023-09-05

## 2023-09-07 ENCOUNTER — APPOINTMENT (OUTPATIENT)
Dept: INTERNAL MEDICINE | Facility: CLINIC | Age: 63
End: 2023-09-07
Payer: COMMERCIAL

## 2023-09-07 VITALS
HEART RATE: 72 BPM | TEMPERATURE: 96.3 F | HEIGHT: 59 IN | RESPIRATION RATE: 16 BRPM | DIASTOLIC BLOOD PRESSURE: 72 MMHG | WEIGHT: 147 LBS | OXYGEN SATURATION: 97 % | SYSTOLIC BLOOD PRESSURE: 138 MMHG | BODY MASS INDEX: 29.64 KG/M2

## 2023-09-07 DIAGNOSIS — D25.9 LEIOMYOMA OF UTERUS, UNSPECIFIED: ICD-10-CM

## 2023-09-07 PROCEDURE — 99213 OFFICE O/P EST LOW 20 MIN: CPT

## 2023-09-07 RX ORDER — LOSARTAN POTASSIUM 50 MG/1
50 TABLET, FILM COATED ORAL
Qty: 30 | Refills: 1 | Status: DISCONTINUED | COMMUNITY
Start: 2023-08-23 | End: 2023-09-07

## 2023-09-07 NOTE — HISTORY OF PRESENT ILLNESS
[FreeTextEntry1] : follow up, BP check [de-identified] : 62F w/ PMHx of Depression, Anxiety, HTN, HLD, Angioedema, and GERD presents to the office for BP check after discontinuing Amlodipine and starting Losartan 50mg two weeks ago. She is maintained on Metoprolol tartrate 50mg daily.   Her BP readings at home are -181 and DBP 80s-112. She eats a no added salt diet and watches her fat & sugar intake. She does not exercise much. She has lost 16 pounds since February.  Additionally, she has been suffering from pelvic pain, she was seen by GYN, a pelvic US demonstrated a uterine fibroid, an MRI has been ordrered.

## 2023-09-07 NOTE — PLAN
[FreeTextEntry1] : 1. Increase Losartan to 100mg daily. Continue Metoprolol 50mg daily. 2. Continue low fat/chol and sodium diet, increase physical activity. 3. Close f/u with GYN. 4. F/U in 1 month.

## 2023-09-18 ENCOUNTER — NON-APPOINTMENT (OUTPATIENT)
Age: 63
End: 2023-09-18

## 2023-09-18 ENCOUNTER — APPOINTMENT (OUTPATIENT)
Dept: INTERNAL MEDICINE | Facility: CLINIC | Age: 63
End: 2023-09-18
Payer: COMMERCIAL

## 2023-09-18 ENCOUNTER — OUTPATIENT (OUTPATIENT)
Dept: OUTPATIENT SERVICES | Facility: HOSPITAL | Age: 63
LOS: 1 days | End: 2023-09-18
Payer: COMMERCIAL

## 2023-09-18 ENCOUNTER — APPOINTMENT (OUTPATIENT)
Dept: ULTRASOUND IMAGING | Facility: CLINIC | Age: 63
End: 2023-09-18
Payer: COMMERCIAL

## 2023-09-18 VITALS
BODY MASS INDEX: 30.24 KG/M2 | TEMPERATURE: 98.4 F | DIASTOLIC BLOOD PRESSURE: 80 MMHG | HEART RATE: 61 BPM | WEIGHT: 150 LBS | HEIGHT: 59 IN | OXYGEN SATURATION: 98 % | SYSTOLIC BLOOD PRESSURE: 140 MMHG | RESPIRATION RATE: 16 BRPM

## 2023-09-18 DIAGNOSIS — M79.661 PAIN IN RIGHT LOWER LEG: ICD-10-CM

## 2023-09-18 PROCEDURE — 93971 EXTREMITY STUDY: CPT

## 2023-09-18 PROCEDURE — 99213 OFFICE O/P EST LOW 20 MIN: CPT

## 2023-09-18 PROCEDURE — 93971 EXTREMITY STUDY: CPT | Mod: 26,RT

## 2023-09-18 RX ORDER — ALPRAZOLAM 0.5 MG/1
0.5 TABLET ORAL
Qty: 60 | Refills: 0 | Status: DISCONTINUED | COMMUNITY
Start: 2022-08-30 | End: 2023-09-18

## 2023-09-21 ENCOUNTER — TRANSCRIPTION ENCOUNTER (OUTPATIENT)
Age: 63
End: 2023-09-21

## 2023-10-02 NOTE — PLAN
[FreeTextEntry1] : 1. Stop Amlodipine. Start Losartan 50mg daily. Amlodipine not increased 2/2 hx of ankle edema. F/U in 2 weeks for BP check. 2. F/U with GYN for the vaginal pressure. 3. F/U with GI for the management of H. pylori.  4. Continue routine f/u with psych. 5. Recommend ophthalmology and dermatology exams.  6. Low fat/salt/cholesterol diet with exercise.  7. Flu and Covid vaccines this fall. 2nd dose of Shingrix in 2-6 months.  8. F/U with Dr. Rosales in 3 months for a PFT. 9. Routine f/u with cardiology.  Consent (Lip)/Introductory Paragraph: The rationale for Mohs was explained to the patient and consent was obtained. The risks, benefits and alternatives to therapy were discussed in detail. Specifically, the risks of lip deformity, changes in the oral aperture, infection, scarring, bleeding, prolonged wound healing, incomplete removal, allergy to anesthesia, nerve injury and recurrence were addressed. Prior to the procedure, the treatment site was clearly identified and confirmed by the patient. All components of Universal Protocol/PAUSE Rule completed.

## 2023-10-11 ENCOUNTER — APPOINTMENT (OUTPATIENT)
Dept: OBGYN | Facility: CLINIC | Age: 63
End: 2023-10-11
Payer: COMMERCIAL

## 2023-10-11 VITALS
HEIGHT: 59 IN | SYSTOLIC BLOOD PRESSURE: 128 MMHG | WEIGHT: 150 LBS | BODY MASS INDEX: 30.24 KG/M2 | DIASTOLIC BLOOD PRESSURE: 80 MMHG

## 2023-10-11 DIAGNOSIS — M54.9 DORSALGIA, UNSPECIFIED: ICD-10-CM

## 2023-10-11 DIAGNOSIS — R10.2 PELVIC AND PERINEAL PAIN: ICD-10-CM

## 2023-10-11 PROCEDURE — 99214 OFFICE O/P EST MOD 30 MIN: CPT

## 2023-10-13 ENCOUNTER — APPOINTMENT (OUTPATIENT)
Dept: INTERNAL MEDICINE | Facility: CLINIC | Age: 63
End: 2023-10-13
Payer: COMMERCIAL

## 2023-10-13 VITALS
WEIGHT: 145 LBS | SYSTOLIC BLOOD PRESSURE: 140 MMHG | HEIGHT: 59 IN | DIASTOLIC BLOOD PRESSURE: 80 MMHG | HEART RATE: 68 BPM | OXYGEN SATURATION: 98 % | RESPIRATION RATE: 16 BRPM | BODY MASS INDEX: 29.23 KG/M2 | TEMPERATURE: 98.4 F

## 2023-10-13 PROCEDURE — 99213 OFFICE O/P EST LOW 20 MIN: CPT

## 2023-10-13 RX ORDER — CLONAZEPAM 0.5 MG/1
0.5 TABLET ORAL DAILY
Refills: 0 | Status: ACTIVE | COMMUNITY
Start: 2023-10-13

## 2023-10-17 ENCOUNTER — APPOINTMENT (OUTPATIENT)
Dept: OBGYN | Facility: CLINIC | Age: 63
End: 2023-10-17
Payer: COMMERCIAL

## 2023-10-17 VITALS
SYSTOLIC BLOOD PRESSURE: 122 MMHG | WEIGHT: 150 LBS | DIASTOLIC BLOOD PRESSURE: 80 MMHG | HEIGHT: 59 IN | BODY MASS INDEX: 30.24 KG/M2

## 2023-10-17 DIAGNOSIS — N89.8 OTHER SPECIFIED NONINFLAMMATORY DISORDERS OF VAGINA: ICD-10-CM

## 2023-10-17 DIAGNOSIS — Z12.39 ENCOUNTER FOR OTHER SCREENING FOR MALIGNANT NEOPLASM OF BREAST: ICD-10-CM

## 2023-10-17 DIAGNOSIS — N94.10 UNSPECIFIED DYSPAREUNIA: ICD-10-CM

## 2023-10-17 DIAGNOSIS — Z01.419 ENCOUNTER FOR GYNECOLOGICAL EXAMINATION (GENERAL) (ROUTINE) W/OUT ABNORMAL FINDINGS: ICD-10-CM

## 2023-10-17 DIAGNOSIS — Z12.4 ENCOUNTER FOR SCREENING FOR MALIGNANT NEOPLASM OF CERVIX: ICD-10-CM

## 2023-10-17 PROCEDURE — 99397 PER PM REEVAL EST PAT 65+ YR: CPT

## 2023-10-18 LAB — HPV HIGH+LOW RISK DNA PNL CVX: NOT DETECTED

## 2023-10-19 LAB — CYTOLOGY CVX/VAG DOC THIN PREP: ABNORMAL

## 2023-10-19 RX ORDER — LOSARTAN POTASSIUM 100 MG/1
100 TABLET, FILM COATED ORAL
Qty: 90 | Refills: 3 | Status: DISCONTINUED | COMMUNITY
Start: 2023-09-07 | End: 2023-10-19

## 2023-10-31 ENCOUNTER — NON-APPOINTMENT (OUTPATIENT)
Age: 63
End: 2023-10-31

## 2023-11-01 ENCOUNTER — APPOINTMENT (OUTPATIENT)
Dept: OBGYN | Facility: CLINIC | Age: 63
End: 2023-11-01

## 2023-11-03 ENCOUNTER — NON-APPOINTMENT (OUTPATIENT)
Age: 63
End: 2023-11-03

## 2023-11-03 ENCOUNTER — APPOINTMENT (OUTPATIENT)
Dept: CARDIOLOGY | Facility: CLINIC | Age: 63
End: 2023-11-03
Payer: COMMERCIAL

## 2023-11-03 VITALS
OXYGEN SATURATION: 99 % | HEIGHT: 59 IN | BODY MASS INDEX: 30.04 KG/M2 | SYSTOLIC BLOOD PRESSURE: 152 MMHG | HEART RATE: 77 BPM | DIASTOLIC BLOOD PRESSURE: 84 MMHG | WEIGHT: 149 LBS

## 2023-11-03 VITALS — SYSTOLIC BLOOD PRESSURE: 160 MMHG | DIASTOLIC BLOOD PRESSURE: 94 MMHG

## 2023-11-03 DIAGNOSIS — I10 ESSENTIAL (PRIMARY) HYPERTENSION: ICD-10-CM

## 2023-11-03 DIAGNOSIS — E78.5 HYPERLIPIDEMIA, UNSPECIFIED: ICD-10-CM

## 2023-11-03 PROCEDURE — 99204 OFFICE O/P NEW MOD 45 MIN: CPT | Mod: 25

## 2023-11-03 PROCEDURE — 93000 ELECTROCARDIOGRAM COMPLETE: CPT

## 2023-11-03 RX ORDER — OLMESARTAN MEDOXOMIL 20 MG/1
20 TABLET, FILM COATED ORAL DAILY
Qty: 90 | Refills: 3 | Status: DISCONTINUED | COMMUNITY
Start: 2023-10-19 | End: 2023-11-03

## 2023-11-03 RX ORDER — METOPROLOL TARTRATE 50 MG/1
50 TABLET, FILM COATED ORAL
Refills: 0 | Status: COMPLETED | COMMUNITY
Start: 2023-10-13 | End: 2023-11-03

## 2023-11-03 RX ORDER — METOPROLOL TARTRATE 50 MG/1
50 TABLET, FILM COATED ORAL EVERY MORNING
Qty: 60 | Refills: 11 | Status: COMPLETED | COMMUNITY
Start: 2021-02-01 | End: 2023-11-03

## 2023-11-03 RX ORDER — ESTRADIOL 0.1 MG/G
0.1 CREAM VAGINAL
Qty: 1 | Refills: 3 | Status: DISCONTINUED | COMMUNITY
Start: 2023-10-17 | End: 2023-11-03

## 2023-11-17 ENCOUNTER — APPOINTMENT (OUTPATIENT)
Dept: CARDIOLOGY | Facility: CLINIC | Age: 63
End: 2023-11-17
Payer: COMMERCIAL

## 2023-11-17 VITALS — DIASTOLIC BLOOD PRESSURE: 84 MMHG | SYSTOLIC BLOOD PRESSURE: 138 MMHG

## 2023-11-17 VITALS
DIASTOLIC BLOOD PRESSURE: 84 MMHG | SYSTOLIC BLOOD PRESSURE: 138 MMHG | HEIGHT: 59 IN | HEART RATE: 72 BPM | OXYGEN SATURATION: 98 % | BODY MASS INDEX: 29.84 KG/M2 | WEIGHT: 148 LBS

## 2023-11-17 PROCEDURE — 99213 OFFICE O/P EST LOW 20 MIN: CPT

## 2023-11-27 ENCOUNTER — RX CHANGE (OUTPATIENT)
Age: 63
End: 2023-11-27

## 2023-11-27 RX ORDER — CARVEDILOL 25 MG/1
25 TABLET, FILM COATED ORAL
Qty: 180 | Refills: 2 | Status: ACTIVE | COMMUNITY
Start: 2023-11-03 | End: 1900-01-01

## 2023-12-29 ENCOUNTER — APPOINTMENT (OUTPATIENT)
Dept: CARDIOLOGY | Facility: CLINIC | Age: 63
End: 2023-12-29
Payer: COMMERCIAL

## 2023-12-29 VITALS
HEART RATE: 74 BPM | OXYGEN SATURATION: 98 % | DIASTOLIC BLOOD PRESSURE: 78 MMHG | WEIGHT: 149 LBS | BODY MASS INDEX: 30.09 KG/M2 | SYSTOLIC BLOOD PRESSURE: 130 MMHG

## 2023-12-29 DIAGNOSIS — I10 ESSENTIAL (PRIMARY) HYPERTENSION: ICD-10-CM

## 2023-12-29 PROCEDURE — 99213 OFFICE O/P EST LOW 20 MIN: CPT

## 2023-12-29 RX ORDER — LOSARTAN POTASSIUM 100 MG/1
100 TABLET, FILM COATED ORAL
Qty: 90 | Refills: 0 | Status: COMPLETED | COMMUNITY
Start: 2023-11-03 | End: 2023-12-29

## 2023-12-29 RX ORDER — HYDROCHLOROTHIAZIDE 12.5 MG/1
12.5 TABLET ORAL DAILY
Qty: 90 | Refills: 0 | Status: COMPLETED | COMMUNITY
Start: 2023-11-17 | End: 2023-12-29

## 2023-12-29 NOTE — PHYSICAL EXAM
[No Acute Distress] : no acute distress [Normal S1, S2] : normal S1, S2 [Clear Lung Fields] : clear lung fields [Normal Gait] : normal gait [Alert and Oriented] : alert and oriented [No Murmur] : no murmur [No Edema] : no edema [de-identified] : Overweight

## 2023-12-29 NOTE — REVIEW OF SYSTEMS
[Anxiety] : anxiety [Under Stress] : under stress [Negative] : Heme/Lymph [Headache] : no headache [Feeling Fatigued] : feeling fatigued [SOB] : no shortness of breath [Chest Discomfort] : no chest discomfort [Palpitations] : no palpitations [FreeTextEntry9] : Spine pain

## 2023-12-29 NOTE — DISCUSSION/SUMMARY
[With Me] : with me [___ Month(s)] : in [unfilled] month(s) [FreeTextEntry1] :  Hypertension: Blood pressure continues to improve with Rx titrations.  Continue losartan and HCTZ (combined in single tablet) + Coreg.

## 2023-12-29 NOTE — HISTORY OF PRESENT ILLNESS
[FreeTextEntry1] : Diana Thorpe is a 63-year-old woman with a history of hypertension, hyperlipidemia, angioedema (unclear etiology), mild COPD, anxiety/depression, returns for cardiac examination.  Her blood pressure was elevated when I saw her approximately 6 weeks ago; at that time I prescribed HCTZ 12.5 mg daily.  He has been tolerating therapy; no cardiovascular complaints; undergoing physical therapy for spine disease.
none

## 2023-12-30 NOTE — ED ADULT NURSE NOTE - HOW OFTEN DO YOU HAVE A DRINK CONTAINING ALCOHOL?
[FreeTextEntry1] : This vilma patient began transitioning since May 2022. She has been on hormonal therapy consistently since May 2022. She has been in therapy and was diagnosed with Gender Dysphoria concerned about certain facial features that appear masculine and cause bullying desires facial feminization surgery followed by Transgender team. The following facial features appear masculine and will need to be modified: -Brow -Nose -Jawline -Neck -cheeks -Upper lip   Allergies: - N/A   Meds: - Estrogen - Spironolactone - Finasteride   Surgical History Surgery Type: Mini Lipo + Mini BBL Surgeon: Navarro Burris Year: 2023 Complications?: N/A  Surgery Type: Mini Lipo + Mini BBL Year: 2020  Denies previous botox or silicone injections. Patient admits to previous fillers to nasolabial fold, outlines of her upper and lower lip.  SH: Denies marijuana use, Denies Cigarette use   ROS: General health / Constitutional: no fever, no chills, no unusual weight changes, no energy level changes, no night sweats Skin: No color or pigmentation changes, no pruritis, no rashes, no ulcers, Hair: No changes in color, texture, distribution, loss Nails: No color changes, brittleness, infection Head: No headaches, no new jaw pain Eyes: Good visual acuity, no color blindness, no corrective lenses, no photophobia, no diplopia, no blurred vision, no infection, pain, no medications, Ear: no tinnitus, no discharge, no pain, no medications Nose: no epistaxis, no rhinorrhea, no rhinitis, no pain, Mouth & Throat: no gingivitis, no gingival bleeding, no ulcers, no voice changes, no changes in oral mucosa or tongue Neck no stiffness, no pain, no lymphadenitis, no thyroid disorders, Respiratory: no cough, no dyspnea, no wheezing, no chest pain, cyanosis, no pneumonia, no asthma, Cardiovascular: no chest pain, no palpitations, no irregular rhythm, no tachycardia, no bradycardia, no heart failure, no dyspnea on exertion (GREEN), no edema Gastrointestinal: no nausea / vomiting, no dysphagia, no reflux / GERD, no abdominal pain, no jaundice Musculoskeletal: no pain in muscles, bones, or joints; no fractures, no dislocations, no muscular weakness, no atrophy Neurological: no paresis, no paralysis, no paresthesia, no seizures, no dizziness, no syncope, no ataxia, no tremor Psychological: no childhood behavioral problems, no irritability, no sleep changes Hematological: no anemia, no bruising, no bleeding tendencies,   PHYSICAL EXAM General: WDWN, in no distress, A & O x 3 (person, place, time) HEENT Head: AT/NC (atraumatic, normocephalic), including TMJ, sensory and motor; + Prominent brow and lateral orbital rim type III Eyes: EOMI, PERRLA Ears: exterior, nl hearing, Nose: + slightly wide, bulbous nasal tip with acute nasolabial angle intranasal exam showed enlarged turbinates and deviated caudal septum Throat & mouth: gd palate elevation, nl tongue mobility, nl tonsil size; + Prominent mandibular angle with active masseter, boxy wide chin, Excess buccal fat, Hypoplastic upper lip. Neck: no masses, nl pulses, no prominent tracheal bulge ("Sumit's Apple")   We had a 25 minute meeting with the patient discussing diagnosis and medical management issues and outcomes.   First stage FFS: 21139: Frontal sinus anterior wall setback 90029: Orbital reconstruction bilateral 23000: Hairline lowering 43233: Browlift bilateral 03266: Supraorbital bar recontouring bilateral 93662: Tarsorrhaphy bilateral 50542: Mandibular angle contouring 41269-31: Mandibular angle osteotomy 52981: Mandibular reconstruction with autologous tissue bilateral 55756: Osseous genioplasty narrowing, shortening 45034: Mandibular reconstruction with prosthetics bilateral 42308: Rhinoplasty open 41663: Submucous resection of septum 20912: Cartilage grafting for nasal reconstruction, use of cadaveric cartilage for  grafts, columellar strut, tip graft 15839 x 2: Submental fat excision and buccal fat excision 33808: platysmaplasty 69519: Upper lip augmentation with temporalis fascia       21139 (Frontal sinus setback): Previous exposure to testosterone has led this patient to have a male appearing forehead with a more bossed shaped; this is opposed to a female appearing forehead that is more flat. A frontal sinus setback procedure will reshape the anterior wall of the frontal sinus by pushing back the bone and change the contour from convex (male-shape) to flat (female-shape). This surgical change will create a more flattened feminine brow appearance.          51930 (hairline lowering) and 79687 (Browlift): Previous exposure to testosterone has led to the patient having a male M-shaped hairline as opposed to a female upside-down U-shaped hairline. Her receded hairline also creates a high, broad male appearing forehead. Her low set eyebrows on top of her orbital roof rim give her a lin, male-appearing look. Both of these male traits: a high hairline and low-set brows are causing her intense feelings of dysphoria. She would benefit from bilateral browlift and hairline lowering procedures. Raising her lateral eyebrow with a bilateral browlift will create a more female appearance. She has stressed a strong desire to wear her own natural hair and does not want to always have to wear a wig to cover up her male features.          28074 (bilateral orbital reconstruction): The bone growth that occurred during testosterone exposure in the upper half of each orbital has caused this patient to have male appearing orbital features that contribute to the sense of dysphoria she feels in public. Orbital reconstruction with a reciprocating saw for an L-shaped ostectomy, on both sides will help remove the excessive bony protrusion; this will be followed by bone material grafting and resorbable plate fixation. The bilateral orbital reconstruction will help alleviate these orbital and upper facial male features.          26142 (Supraorbital bar contouring): This patient has orbital lateral hooding or overhang of her lateral frontal bone which is typically associated with the male skull and orbits. Supraorbital contouring of this lateral orbital region with a pineapple samina will correct this male feature that is causing this patient dysphoria. These procedures also allows us to do a success browlift procedure since the overhang of bone can inhibit the upper movement of the brow and the removal of this allows for an effective lift.          12083 (Tarsorrhaphy): Bilateral tarsorrhaphy or surgical closure of both eyelids, after protective lacrilube or perilube ointment is applied, is necessary for the safety of the patients globes. With the brow reshaping and browlift procedure the upper eyelid will be pulled up so the globe will be exposed and unprotected during this long, proposed procedure. The tarsorrhaphies, allows both globes to be protected so the complications of corneal abrasions may be prevented.          29449-51 (mandibular angle osteotomy) and 21209 (mandibular angle contouring): This patient has an angular, more boxy jaw which results in male appearing lower face. She also has increased lower facial width related to her mandibular angle lateral projection. Mandibular angle resection and contouring will create a more feminine triangular jaw. By having a mandibular angle reduction through both of these procedures, the lower facial width is narrowed and this will create a V-shaped, feminine appearance of the jawline when viewed from the front.           63603 (Reconstruction of lower jaw with autologous tissue): This patients wide, U-shaped lower jaw accounts for public criticism related to male facial features. We propose reconstruction of the lower jaw with bone graft material layered to form a more feminine shape. The goal of this autologous tissue reconstructive procedure is to achieve a tapered, feminine lower jaw.          61544 (osseous genioplasty): This patient has a wide, large chin that contributes to her feelings of gender dysphoria and being mis-gendered in public.  The patient would benefit from an osseous genioplasty that narrows and shortens the chin. The osseous genioplasty will help create a more feminine and more, slender chin.          25545 (Reconstruction of lower jaw with prosthetic): This patient complained of a male shape to the lower one-third of her face. Reconstruction with a titanium implant used to create a chin point angle and support the bone in healing will help the patient achieve her goal of a more female lower face.          08782 (Rhinoplasty): This patients nose has characteristics of a male nose. The male nose is often larger and wider with a more bulbous nasal tip. These male nasal features make her feel masculine which exacerbates her gender dysphoria. A rhinoplasty would be beneficial to feminize her nose by creating a smaller nose and more delicate, softer nasal tip. The lateral dorsal shape will also be feminized by the rhinoplasty.          63069 (Submucous resection of nose): This patients nasal septum is shaped like a male septum. The septum is the supportive pole that holds up the structure of the nasal pyramid. In this case the septum will also be used to provide cartilage tissue necessary for nasal grafts. This septoplasty is required to modify the septum to create a straight nose with good functional breathing while taking away the characteristics of a male nose.          56655 (Cartilage grafting for nasal reconstruction): Cartilage grafting is crucial for the performance of the feminizing rhinoplasty. This patient has an ethnic type of nose. With regards to her nose, she wants to keep true to her ethnicity while appearing more feminine. To do that cartilage grafts including, bilateral  grafts, a columellar strut graft, a dorsal onlay graft, and nasal tip graft are all necessary.  The dorsal onlay graft will raise the nasal radix and improve the frontonasal regional shape.          15839 x 2 (Submental fat removal and buccal fat excision): Testosterone exposure will build fibrofatty tissue in the submental region that is not corrected by hormone therapy. This patient has this fibrofatty tissue in the submental region which has created a masculine lateral profile appearance. Direct submental fibrofatty tissue excision is necessary to correct this male feature. Buccal fat removal is also necessary for this patient because it accentuates the cheeks bones creating a more feminine appearance.           85851 (Platysmaplasty): With prolonged testosterone exposure, the submental region will appear full and ptotic. This patient has a full and ptotic submental and neck region which is associated with a male-appearing neck. The female neck is slender and tight. The platsymaplasty, performed after submental fat excision, will help create a slender and tight, female appearing neck.          36168 (Upper lip augmentation): Females are shown to have [place] lips than males, therefore, an upper lip augmentation will create a more feminine appearance for this patient as she has currently has hypoplastic lips. Lip augmentation is also a procedure that not all patients need but we deem that this procedure is necessary for this patient as it well help alleviate her gender dysphoria.  Needs a 3D CT scan and Virtual Surgical Planning. She will need to provide a letter from her therapist and hormone provider. Will need PCP clearance.  Advised to lose weight for a better result and safer surgery.  Our team also spent an additional 45 minutes to determine best operative strategies for this patient using 3D imaging and morphing program (Deep [employer] AI). We took 3D images and downloaded them to the software necessary for modifying each facial region to replicated surgical changes that are possible. This was used as a communication tool for the patients to determine which facial features were dysphoric and to determine how surgical modification will help with each feature. It was also used to determine how much modification would be best for this individual patient.   Patient seen in conjunction with Dr. Daniel Sorenson. Never

## 2024-01-22 ENCOUNTER — APPOINTMENT (OUTPATIENT)
Dept: OBGYN | Facility: CLINIC | Age: 64
End: 2024-01-22
Payer: COMMERCIAL

## 2024-01-22 PROCEDURE — 99213 OFFICE O/P EST LOW 20 MIN: CPT

## 2024-01-23 NOTE — PLAN
[FreeTextEntry1] :  discussed in detail that fibroids do not need to be removed when they are small.  She had GYN and GI workup which was negative  Her pain is likely musculoskeletal related she was referred to dr. johnson for eval and consult all of her questions were answered she is agreeable with plan     I Lynda SEN am scribing for the presence of Dr. Valerio the following sections HISTORY OF PRESENT ILLNESS, PAST MEDICAL/FAMILY/SOCIAL HISTORY; REVIEW OF SYSTEMS; VITAL SIGNS; PHYSICAL EXAM; DISPOSITION.    I personally performed the services described in the documentation, reviewed the documentation recorded by the scribe in my presence and it accurately and completely records my words and actions.

## 2024-01-23 NOTE — HISTORY OF PRESENT ILLNESS
[FreeTextEntry1] : Patient is a 64 yo female here today for consultation for uterine fibroids. She is a patient of dr. martinez    she complains of pelvic pain and pelvic pressure and pain shooting down her legs   she had a pelvic sonogram done october 2022 which reveals a 1.7cm uterine fibroid she had a pelvic MRI revealing a 2cm uterine fibroid   UTD on pap smear and colonoscopy  hx of  D&C with me years ago.

## 2024-04-23 ENCOUNTER — APPOINTMENT (OUTPATIENT)
Dept: OBGYN | Facility: CLINIC | Age: 64
End: 2024-04-23
Payer: COMMERCIAL

## 2024-04-23 VITALS
HEART RATE: 75 BPM | SYSTOLIC BLOOD PRESSURE: 142 MMHG | RESPIRATION RATE: 14 BRPM | WEIGHT: 153 LBS | DIASTOLIC BLOOD PRESSURE: 70 MMHG | HEIGHT: 59 IN | BODY MASS INDEX: 30.84 KG/M2

## 2024-04-23 DIAGNOSIS — R10.2 PELVIC AND PERINEAL PAIN: ICD-10-CM

## 2024-04-23 LAB
BILIRUB UR QL STRIP: NEGATIVE
GLUCOSE UR-MCNC: NEGATIVE
HCG UR QL: 0.2 EU/DL
HGB UR QL STRIP.AUTO: NEGATIVE
KETONES UR-MCNC: NEGATIVE
LEUKOCYTE ESTERASE UR QL STRIP: NEGATIVE
NITRITE UR QL STRIP: NEGATIVE
PH UR STRIP: 5.5
PROT UR STRIP-MCNC: NEGATIVE
SP GR UR STRIP: >=1.03

## 2024-04-23 PROCEDURE — 81003 URINALYSIS AUTO W/O SCOPE: CPT | Mod: QW

## 2024-04-23 PROCEDURE — 99213 OFFICE O/P EST LOW 20 MIN: CPT | Mod: 25

## 2024-04-23 NOTE — HISTORY OF PRESENT ILLNESS
[FreeTextEntry1] : Pt c/o pelvic pain, R.L that has been going on for 2 yrs. She has seen multiple gyn providers about this. She had TVUS 9/2023 showing 2.2 cm fiboid, normal ovaries, then had pelvic MRI 10/2023 that confirmed small 2+cm fibroid only, no other gyn abnormalities. She still wonders if the pain she is having is due to fibroid. No vb, no vaginal dc, +night sweats. Pelvic pain exacerbated by sleeping no urinary symptoms +pain with intercourse, estrogen cream helping. Feels bloated, colonoscopy UTD, occ constipation.

## 2024-04-23 NOTE — PHYSICAL EXAM
[Appropriately responsive] : appropriately responsive [Alert] : alert [No Acute Distress] : no acute distress [FreeTextEntry7] : tenderness in abdominal wall on right side with palpation [Labia Majora] : normal [Labia Minora] : normal [Normal] : normal [Tenderness] : nontender [Enlarged ___ wks] : not enlarged [Mass ___ cm] : no uterine mass was palpated [Uterine Adnexae] : non-palpable

## 2024-04-23 NOTE — PLAN
[FreeTextEntry1] : Reassured patient again that fibroid is not causing pelvic pain. No gyn cause of pain on imaging. Suspect MS, therefore, recommend pelvic floor PT.

## 2024-07-01 LAB
25(OH)D3 SERPL-MCNC: 39.6 NG/ML
25(OH)D3 SERPL-MCNC: 51.4 NG/ML
ALBUMIN SERPL ELPH-MCNC: 4.6 G/DL
ALBUMIN SERPL ELPH-MCNC: 4.8 G/DL
ALP BLD-CCNC: 64 U/L
ALP BLD-CCNC: 65 U/L
ALT SERPL-CCNC: 15 U/L
ALT SERPL-CCNC: 21 U/L
ANION GAP SERPL CALC-SCNC: 16 MMOL/L
ANION GAP SERPL CALC-SCNC: 9 MMOL/L
APPEARANCE: CLEAR
APPEARANCE: CLEAR
AST SERPL-CCNC: 16 U/L
AST SERPL-CCNC: 19 U/L
BACTERIA: NEGATIVE /HPF
BACTERIA: NEGATIVE /HPF
BASOPHILS # BLD AUTO: 0.03 K/UL
BASOPHILS NFR BLD AUTO: 0.4 %
BILIRUB SERPL-MCNC: 0.3 MG/DL
BILIRUB SERPL-MCNC: 0.5 MG/DL
BILIRUBIN URINE: NEGATIVE
BILIRUBIN URINE: NEGATIVE
BLOOD URINE: NEGATIVE
BLOOD URINE: NEGATIVE
BUN SERPL-MCNC: 18 MG/DL
BUN SERPL-MCNC: 25 MG/DL
CALCIUM SERPL-MCNC: 10.4 MG/DL
CALCIUM SERPL-MCNC: 9.7 MG/DL
CAST: 0 /LPF
CAST: 1 /LPF
CHLORIDE SERPL-SCNC: 102 MMOL/L
CHLORIDE SERPL-SCNC: 104 MMOL/L
CHOLEST SERPL-MCNC: 129 MG/DL
CHOLEST SERPL-MCNC: 171 MG/DL
CO2 SERPL-SCNC: 22 MMOL/L
CO2 SERPL-SCNC: 28 MMOL/L
COLOR: NORMAL
COLOR: NORMAL
CREAT SERPL-MCNC: 0.8 MG/DL
CREAT SERPL-MCNC: 0.87 MG/DL
EGFR: 74 ML/MIN/1.73M2
EGFR: 82 ML/MIN/1.73M2
EOSINOPHIL # BLD AUTO: 0.18 K/UL
EOSINOPHIL NFR BLD AUTO: 2.3 %
EPITHELIAL CELLS: 1 /HPF
EPITHELIAL CELLS: 1 /HPF
ESTIMATED AVERAGE GLUCOSE: 105 MG/DL
ESTIMATED AVERAGE GLUCOSE: 108 MG/DL
GLUCOSE QUALITATIVE U: NEGATIVE MG/DL
GLUCOSE QUALITATIVE U: NEGATIVE MG/DL
GLUCOSE SERPL-MCNC: 83 MG/DL
GLUCOSE SERPL-MCNC: 86 MG/DL
HBA1C MFR BLD HPLC: 5.3 %
HBA1C MFR BLD HPLC: 5.4 %
HCT VFR BLD CALC: 43.2 %
HDLC SERPL-MCNC: 58 MG/DL
HDLC SERPL-MCNC: 67 MG/DL
HGB BLD-MCNC: 14 G/DL
IMM GRANULOCYTES NFR BLD AUTO: 0.3 %
KETONES URINE: NEGATIVE MG/DL
KETONES URINE: NEGATIVE MG/DL
LDLC SERPL CALC-MCNC: 50 MG/DL
LDLC SERPL CALC-MCNC: 78 MG/DL
LEUKOCYTE ESTERASE URINE: NEGATIVE
LEUKOCYTE ESTERASE URINE: NEGATIVE
LYMPHOCYTES # BLD AUTO: 1.33 K/UL
LYMPHOCYTES NFR BLD AUTO: 16.7 %
MAN DIFF?: NORMAL
MCHC RBC-ENTMCNC: 28.6 PG
MCHC RBC-ENTMCNC: 32.4 GM/DL
MCV RBC AUTO: 88.3 FL
MICROSCOPIC-UA: NORMAL
MICROSCOPIC-UA: NORMAL
MONOCYTES # BLD AUTO: 0.59 K/UL
MONOCYTES NFR BLD AUTO: 7.4 %
NEUTROPHILS # BLD AUTO: 5.83 K/UL
NEUTROPHILS NFR BLD AUTO: 72.9 %
NITRITE URINE: NEGATIVE
NITRITE URINE: NEGATIVE
NONHDLC SERPL-MCNC: 105 MG/DL
NONHDLC SERPL-MCNC: 72 MG/DL
PH URINE: 5.5
PH URINE: 5.5
PLATELET # BLD AUTO: 207 K/UL
POTASSIUM SERPL-SCNC: 4.6 MMOL/L
POTASSIUM SERPL-SCNC: 5 MMOL/L
PROT SERPL-MCNC: 7.1 G/DL
PROT SERPL-MCNC: 7.3 G/DL
PROTEIN URINE: NEGATIVE MG/DL
PROTEIN URINE: NEGATIVE MG/DL
RBC # BLD: 4.89 M/UL
RBC # FLD: 13.6 %
RED BLOOD CELLS URINE: 0 /HPF
RED BLOOD CELLS URINE: 0 /HPF
SODIUM SERPL-SCNC: 138 MMOL/L
SODIUM SERPL-SCNC: 141 MMOL/L
SPECIFIC GRAVITY URINE: 1.02
SPECIFIC GRAVITY URINE: 1.02
T3FREE SERPL-MCNC: 2.14 PG/ML
T3FREE SERPL-MCNC: 2.94 PG/ML
T4 FREE SERPL-MCNC: 1 NG/DL
T4 FREE SERPL-MCNC: 1.4 NG/DL
TRIGL SERPL-MCNC: 127 MG/DL
TRIGL SERPL-MCNC: 158 MG/DL
TSH SERPL-ACNC: 1.21 UIU/ML
TSH SERPL-ACNC: 1.29 UIU/ML
UROBILINOGEN URINE: 0.2 MG/DL
UROBILINOGEN URINE: 0.2 MG/DL
WBC # FLD AUTO: 7.98 K/UL
WHITE BLOOD CELLS URINE: 0 /HPF
WHITE BLOOD CELLS URINE: 1 /HPF

## 2024-08-14 ENCOUNTER — TRANSCRIPTION ENCOUNTER (OUTPATIENT)
Age: 64
End: 2024-08-14

## 2024-08-26 ENCOUNTER — APPOINTMENT (OUTPATIENT)
Dept: INTERNAL MEDICINE | Facility: CLINIC | Age: 64
End: 2024-08-26
Payer: MEDICARE

## 2024-08-26 VITALS
OXYGEN SATURATION: 95 % | DIASTOLIC BLOOD PRESSURE: 90 MMHG | TEMPERATURE: 98.2 F | HEIGHT: 59 IN | WEIGHT: 159 LBS | RESPIRATION RATE: 14 BRPM | SYSTOLIC BLOOD PRESSURE: 138 MMHG | BODY MASS INDEX: 32.05 KG/M2 | HEART RATE: 83 BPM

## 2024-08-26 DIAGNOSIS — Z00.00 ENCOUNTER FOR GENERAL ADULT MEDICAL EXAMINATION W/OUT ABNORMAL FINDINGS: ICD-10-CM

## 2024-08-26 DIAGNOSIS — I10 ESSENTIAL (PRIMARY) HYPERTENSION: ICD-10-CM

## 2024-08-26 DIAGNOSIS — Z23 ENCOUNTER FOR IMMUNIZATION: ICD-10-CM

## 2024-08-26 DIAGNOSIS — Z87.891 PERSONAL HISTORY OF NICOTINE DEPENDENCE: ICD-10-CM

## 2024-08-26 PROCEDURE — 90677 PCV20 VACCINE IM: CPT

## 2024-08-26 PROCEDURE — G0009: CPT

## 2024-08-26 PROCEDURE — 99203 OFFICE O/P NEW LOW 30 MIN: CPT | Mod: 25

## 2024-08-26 PROCEDURE — G0438: CPT

## 2024-08-26 RX ORDER — CARVEDILOL 25 MG/1
25 TABLET, FILM COATED ORAL
Qty: 180 | Refills: 0 | Status: ACTIVE | COMMUNITY
Start: 2024-08-26 | End: 1900-01-01

## 2024-08-26 RX ORDER — ARIPIPRAZOLE 5 MG/1
5 TABLET ORAL DAILY
Refills: 0 | Status: ACTIVE | COMMUNITY

## 2024-08-26 NOTE — REVIEW OF SYSTEMS
[Abdominal Pain] : abdominal pain [Nausea] : nausea [Anxiety] : anxiety [Depression] : depression [Negative] : Heme/Lymph [FreeTextEntry7] : see hpi [de-identified] : see hpi

## 2024-08-26 NOTE — ADDENDUM
[FreeTextEntry1] : This note was written by Pippa Quezada on 08/26/2024 acting as a medical scribe for Dr. Devan Rosales MD. All medical entries made by the scribe were at my, Dr. Devan Rosales's, direction and personally dictated by me on 08/26/2024. I have reviewed the chart and agree that the record accurately reflects my personal performance of the history, review of systems, assessment, and plan. I have also personally directed, reviewed, and agreed with the chart.

## 2024-08-26 NOTE — HISTORY OF PRESENT ILLNESS
[FreeTextEntry1] :  The patient comes in for a yearly wellness evaluation. [de-identified] : The patient is feeling well at this time. The patient has a history of Hypertension for which she continues Losartan-HCTZ 100-12.5 MG once daily. She is meant to be maintained on Carvedilol 25 MG BID, but reports that she self-discontinued the medication. She also has a history of HLD and Hypertriglyceridemia. though she is not maintained on any medications for either issue at this time. There has been no chest pain, shortness of breath, palpitations, or PND. She is following with cardiologist, Dr. John. She does not routinely exercise.   She also has a history of anxiety and depression for which she is maintained in Clonazepam 0.5 MG once daily PRN as well as Paroxetine 40 MG once daily.  She was recently started on 5 mg of Abilify, by the psychiatric nurse practitioner.  She reports a slight improvement in her mood with the addition of this medication.  She reports that she is relatively stable in this regard. She previously followed with psychiatrist DELGADO Renee.   The patient is c/o pelvic pain. She had TVUS 9/2023 showing 2.2 cm fibroid, with normal ovaries. She then underwent pelvic MRI on 10/2023 that confirmed small 2+cm fibroid only, though there were no other gyn abnormalities. She followed with her GYN, Dr. Gardner, who does not feel that the pelvic pain is caused by the fibroid. Dr. Gardner recommended the patient begin pelvic floor PT. The patient continues to believe the pain she is having is due to fibroid.  She reports that she is up to date on her upper endoscopy and colonoscopy. She does report occasional nausea as well as bowel movements shortly after eating. She is not up to date on her dermatologic exam. She is set to follow up with GYN later this week. There have been no cough, fevers, chills, or night sweats.  She notes that she does have frequent bowel movements after eating meals.  She does have some discomfort in her abdominal cavity as well.  There have been no other acute constitutional symptoms. She comes in for this assessment.

## 2024-08-26 NOTE — PHYSICAL EXAM
[Well Nourished] : well nourished [Well Developed] : well developed [Well-Appearing] : well-appearing [Normal Sclera/Conjunctiva] : normal sclera/conjunctiva [EOMI] : extraocular movements intact [Normal Outer Ear/Nose] : the outer ears and nose were normal in appearance [Normal Oropharynx] : the oropharynx was normal [No JVD] : no jugular venous distention [Thyroid Normal, No Nodules] : the thyroid was normal and there were no nodules present [Supple] : supple [No Respiratory Distress] : no respiratory distress  [No Accessory Muscle Use] : no accessory muscle use [Clear to Auscultation] : lungs were clear to auscultation bilaterally [Normal Rate] : normal rate  [Regular Rhythm] : with a regular rhythm [Normal S1, S2] : normal S1 and S2 [No Murmur] : no murmur heard [No Edema] : there was no peripheral edema [No Extremity Clubbing/Cyanosis] : no extremity clubbing/cyanosis [Soft] : abdomen soft [Non Tender] : non-tender [Non-distended] : non-distended [No Masses] : no abdominal mass palpated [No HSM] : no HSM [Normal Bowel Sounds] : normal bowel sounds [Normal Supraclavicular Nodes] : no supraclavicular lymphadenopathy [Normal Posterior Cervical Nodes] : no posterior cervical lymphadenopathy [Normal Anterior Cervical Nodes] : no anterior cervical lymphadenopathy [No CVA Tenderness] : no CVA  tenderness [No Spinal Tenderness] : no spinal tenderness [No Joint Swelling] : no joint swelling [No Rash] : no rash [Coordination Grossly Intact] : coordination grossly intact [Normal Gait] : normal gait [Normal Affect] : the affect was normal [Normal Insight/Judgement] : insight and judgment were intact [de-identified] : midline sx scar [de-identified] : There is slight swelling possibly representing a partially torn quadriceps posteriorly in the left upper outer thigh region. It is tender to palpation. There is no significant warmth.

## 2024-08-26 NOTE — PLAN
[FreeTextEntry1] : 1. Continue current medications as outlined above.  2. The patient will receive the Prevnar 20 vaccine in office today, 8/26/24.   3. The patient will resume the use of Carvedilol 25 MG BID as part of her antihypertensive regimen. She previously self-discontinued the medication.   4. Follow up in 6 weeks with NP Crissy Adamson for office visit and BP check. This visit will be to assess the patient's response to the re-implementation of Carvedilol 25 MG BID into her regimen in addition to her Losartan-HCTZ 100-12.5 MG.    5. Follow up in 6 months with PFT.   6. Follow up with cardiologist, Dr. John, for monitoring of HTN, HLD, and Hypertriglyceridemia.    7. Continue to follow with GYN for annual evaluations.   8. The patient has been referred to Dr. Adame for treatment and monitoring of her mental health issues.  9. Follow up with dermatologist for total body skin evaluation.    10. The Influenza vaccine is recommended in the fall.   11. Cardiovascular exercise as tolerated.

## 2024-08-30 ENCOUNTER — APPOINTMENT (OUTPATIENT)
Dept: OBGYN | Facility: CLINIC | Age: 64
End: 2024-08-30

## 2024-09-22 ENCOUNTER — NON-APPOINTMENT (OUTPATIENT)
Age: 64
End: 2024-09-22

## 2024-10-04 ENCOUNTER — APPOINTMENT (OUTPATIENT)
Dept: INTERNAL MEDICINE | Facility: CLINIC | Age: 64
End: 2024-10-04

## 2024-10-04 ENCOUNTER — APPOINTMENT (OUTPATIENT)
Dept: CARDIOLOGY | Facility: CLINIC | Age: 64
End: 2024-10-04
Payer: MEDICARE

## 2024-10-04 ENCOUNTER — NON-APPOINTMENT (OUTPATIENT)
Age: 64
End: 2024-10-04

## 2024-10-04 VITALS
HEIGHT: 59 IN | DIASTOLIC BLOOD PRESSURE: 78 MMHG | OXYGEN SATURATION: 97 % | HEART RATE: 74 BPM | BODY MASS INDEX: 32.25 KG/M2 | WEIGHT: 160 LBS | SYSTOLIC BLOOD PRESSURE: 130 MMHG

## 2024-10-04 DIAGNOSIS — E66.9 OBESITY, UNSPECIFIED: ICD-10-CM

## 2024-10-04 DIAGNOSIS — I10 ESSENTIAL (PRIMARY) HYPERTENSION: ICD-10-CM

## 2024-10-04 PROCEDURE — 99204 OFFICE O/P NEW MOD 45 MIN: CPT

## 2024-10-04 PROCEDURE — G2211 COMPLEX E/M VISIT ADD ON: CPT

## 2024-10-04 PROCEDURE — 93000 ELECTROCARDIOGRAM COMPLETE: CPT

## 2024-10-04 NOTE — PHYSICAL EXAM
[No Acute Distress] : no acute distress [Normal S1, S2] : normal S1, S2 [No Murmur] : no murmur [Clear Lung Fields] : clear lung fields [Normal Gait] : normal gait [No Edema] : no edema [Alert and Oriented] : alert and oriented [Obese] : obese [de-identified] : Overweight

## 2024-10-04 NOTE — DISCUSSION/SUMMARY
[With Me] : with me [___ Month(s)] : in [unfilled] month(s) [FreeTextEntry1] :  Hypertension: Satisfactory control; continue present doses of both losartan and HCTZ and carvedilol.  We discussed the importance of daily and consistent use of these medications in an effort to control her severely hypertension; I also recommended diet, exercise, and weight loss.  Obesity: BMI approximately 32; weight has increased since I last saw her--lifestyle modifications discussed with patient.  Heterosexual

## 2024-10-04 NOTE — REVIEW OF SYSTEMS
[Under Stress] : under stress [Negative] : Heme/Lymph [Weight Loss (___ Lbs)] : [unfilled] ~Ulb weight loss [SOB] : no shortness of breath [Palpitations] : no palpitations [FreeTextEntry5] : No angina [FreeTextEntry9] : Spine pain

## 2024-10-04 NOTE — HISTORY OF PRESENT ILLNESS
[FreeTextEntry1] : Diana Thorpe is a 64-year-old woman with a history of hypertension, hyperlipidemia, angioedema (unclear etiology), mild COPD, anxiety/depression, returns for cardiac examination -I last saw her in December 2023.  She describes a recent elevation of blood pressure in the setting of missed doses of carvedilol--she has now been doing better with the daily use of both losartan-HCTZ and carvedilol.  She feels well; no new complaints.  *I reviewed laboratory data from June 2024: Cholesterol is reasonably well-controlled (total 171, LDL 78, HDL 67, triglyceride 158).  Normal comprehensive metabolic panel, thyroid function, normal glycemia.

## 2024-10-11 ENCOUNTER — OUTPATIENT (OUTPATIENT)
Dept: OUTPATIENT SERVICES | Facility: HOSPITAL | Age: 64
LOS: 1 days | End: 2024-10-11
Payer: MEDICARE

## 2024-10-11 ENCOUNTER — RESULT REVIEW (OUTPATIENT)
Age: 64
End: 2024-10-11

## 2024-10-11 ENCOUNTER — APPOINTMENT (OUTPATIENT)
Dept: ULTRASOUND IMAGING | Facility: CLINIC | Age: 64
End: 2024-10-11
Payer: MEDICARE

## 2024-10-11 DIAGNOSIS — R10.2 PELVIC AND PERINEAL PAIN: ICD-10-CM

## 2024-10-11 PROCEDURE — 76830 TRANSVAGINAL US NON-OB: CPT | Mod: 26

## 2024-10-11 PROCEDURE — 76856 US EXAM PELVIC COMPLETE: CPT | Mod: 26

## 2024-10-11 PROCEDURE — 76830 TRANSVAGINAL US NON-OB: CPT

## 2024-10-11 PROCEDURE — 76856 US EXAM PELVIC COMPLETE: CPT

## 2024-10-15 ENCOUNTER — TRANSCRIPTION ENCOUNTER (OUTPATIENT)
Age: 64
End: 2024-10-15

## 2024-10-22 ENCOUNTER — APPOINTMENT (OUTPATIENT)
Dept: OBGYN | Facility: CLINIC | Age: 64
End: 2024-10-22
Payer: MEDICARE

## 2024-10-22 VITALS — SYSTOLIC BLOOD PRESSURE: 118 MMHG | BODY MASS INDEX: 32.92 KG/M2 | DIASTOLIC BLOOD PRESSURE: 70 MMHG | WEIGHT: 163 LBS

## 2024-10-22 DIAGNOSIS — Z13.820 ENCOUNTER FOR SCREENING FOR OSTEOPOROSIS: ICD-10-CM

## 2024-10-22 DIAGNOSIS — Z01.419 ENCOUNTER FOR GYNECOLOGICAL EXAMINATION (GENERAL) (ROUTINE) W/OUT ABNORMAL FINDINGS: ICD-10-CM

## 2024-10-22 DIAGNOSIS — Z12.4 ENCOUNTER FOR SCREENING FOR MALIGNANT NEOPLASM OF CERVIX: ICD-10-CM

## 2024-10-22 DIAGNOSIS — Z12.39 ENCOUNTER FOR OTHER SCREENING FOR MALIGNANT NEOPLASM OF BREAST: ICD-10-CM

## 2024-10-22 PROBLEM — M85.80 OSTEOPENIA AFTER MENOPAUSE: Status: ACTIVE | Noted: 2024-10-22

## 2024-10-22 PROCEDURE — 99397 PER PM REEVAL EST PAT 65+ YR: CPT

## 2024-10-23 LAB — HPV HIGH+LOW RISK DNA PNL CVX: NOT DETECTED

## 2024-10-26 LAB — CYTOLOGY CVX/VAG DOC THIN PREP: ABNORMAL

## 2025-01-20 ENCOUNTER — INPATIENT (INPATIENT)
Facility: HOSPITAL | Age: 65
LOS: 8 days | Discharge: HOME | DRG: 914 | End: 2025-01-29
Attending: PSYCHIATRY & NEUROLOGY | Admitting: PSYCHIATRY & NEUROLOGY
Payer: MEDICARE

## 2025-01-20 VITALS
RESPIRATION RATE: 18 BRPM | SYSTOLIC BLOOD PRESSURE: 142 MMHG | DIASTOLIC BLOOD PRESSURE: 74 MMHG | TEMPERATURE: 97 F | HEART RATE: 73 BPM | HEIGHT: 59 IN | OXYGEN SATURATION: 96 % | WEIGHT: 149.91 LBS

## 2025-01-20 DIAGNOSIS — F33.2 MAJOR DEPRESSIVE DISORDER, RECURRENT SEVERE WITHOUT PSYCHOTIC FEATURES: ICD-10-CM

## 2025-01-20 DIAGNOSIS — T14.91XA SUICIDE ATTEMPT, INITIAL ENCOUNTER: ICD-10-CM

## 2025-01-20 LAB
ADD ON TEST-SPECIMEN IN LAB: SIGNIFICANT CHANGE UP
ALBUMIN SERPL ELPH-MCNC: 3.5 G/DL — SIGNIFICANT CHANGE UP (ref 3.3–5)
ALP SERPL-CCNC: 41 U/L — SIGNIFICANT CHANGE UP (ref 40–120)
ALT FLD-CCNC: 29 U/L — SIGNIFICANT CHANGE UP (ref 12–78)
AMPHET UR-MCNC: NEGATIVE — SIGNIFICANT CHANGE UP
ANION GAP SERPL CALC-SCNC: 3 MMOL/L — LOW (ref 5–17)
APAP SERPL-MCNC: <2 UG/ML — LOW (ref 10–30)
AST SERPL-CCNC: 23 U/L — SIGNIFICANT CHANGE UP (ref 15–37)
BARBITURATES UR SCN-MCNC: NEGATIVE — SIGNIFICANT CHANGE UP
BASE EXCESS BLDV CALC-SCNC: -1.3 MMOL/L — SIGNIFICANT CHANGE UP (ref -2–3)
BASOPHILS # BLD AUTO: 0 K/UL — SIGNIFICANT CHANGE UP (ref 0–0.2)
BASOPHILS NFR BLD AUTO: 0 % — SIGNIFICANT CHANGE UP (ref 0–2)
BENZODIAZ UR-MCNC: NEGATIVE — SIGNIFICANT CHANGE UP
BILIRUB SERPL-MCNC: 0.6 MG/DL — SIGNIFICANT CHANGE UP (ref 0.2–1.2)
BUN SERPL-MCNC: 12 MG/DL — SIGNIFICANT CHANGE UP (ref 7–23)
CALCIUM SERPL-MCNC: 8.9 MG/DL — SIGNIFICANT CHANGE UP (ref 8.5–10.1)
CHLORIDE SERPL-SCNC: 110 MMOL/L — HIGH (ref 96–108)
CK SERPL-CCNC: 57 U/L — SIGNIFICANT CHANGE UP (ref 26–192)
CO2 SERPL-SCNC: 25 MMOL/L — SIGNIFICANT CHANGE UP (ref 22–31)
COCAINE METAB.OTHER UR-MCNC: NEGATIVE — SIGNIFICANT CHANGE UP
CREAT SERPL-MCNC: 0.9 MG/DL — SIGNIFICANT CHANGE UP (ref 0.5–1.3)
EGFR: 71 ML/MIN/1.73M2 — SIGNIFICANT CHANGE UP
EOSINOPHIL # BLD AUTO: 0 K/UL — SIGNIFICANT CHANGE UP (ref 0–0.5)
EOSINOPHIL NFR BLD AUTO: 0 % — SIGNIFICANT CHANGE UP (ref 0–6)
ETHANOL SERPL-MCNC: <10 MG/DL — SIGNIFICANT CHANGE UP (ref 0–10)
ETHANOL SERPL-MCNC: <10 MG/DL — SIGNIFICANT CHANGE UP (ref 0–10)
FENTANYL UR QL SCN: NEGATIVE — SIGNIFICANT CHANGE UP
FLUAV AG NPH QL: SIGNIFICANT CHANGE UP
FLUBV AG NPH QL: SIGNIFICANT CHANGE UP
GAS PNL BLDV: SIGNIFICANT CHANGE UP
GLUCOSE SERPL-MCNC: 81 MG/DL — SIGNIFICANT CHANGE UP (ref 70–99)
HCO3 BLDV-SCNC: 24 MMOL/L — SIGNIFICANT CHANGE UP (ref 22–29)
HCT VFR BLD CALC: 35.9 % — SIGNIFICANT CHANGE UP (ref 34.5–45)
HGB BLD-MCNC: 12 G/DL — SIGNIFICANT CHANGE UP (ref 11.5–15.5)
IMM GRANULOCYTES NFR BLD AUTO: 0.4 % — SIGNIFICANT CHANGE UP (ref 0–0.9)
LYMPHOCYTES # BLD AUTO: 1.25 K/UL — SIGNIFICANT CHANGE UP (ref 1–3.3)
LYMPHOCYTES # BLD AUTO: 16 % — SIGNIFICANT CHANGE UP (ref 13–44)
MAGNESIUM SERPL-MCNC: 2.2 MG/DL — SIGNIFICANT CHANGE UP (ref 1.6–2.6)
MCHC RBC-ENTMCNC: 29 PG — SIGNIFICANT CHANGE UP (ref 27–34)
MCHC RBC-ENTMCNC: 33.4 G/DL — SIGNIFICANT CHANGE UP (ref 32–36)
MCV RBC AUTO: 86.7 FL — SIGNIFICANT CHANGE UP (ref 80–100)
METHADONE UR-MCNC: NEGATIVE — SIGNIFICANT CHANGE UP
MONOCYTES # BLD AUTO: 0.38 K/UL — SIGNIFICANT CHANGE UP (ref 0–0.9)
MONOCYTES NFR BLD AUTO: 4.9 % — SIGNIFICANT CHANGE UP (ref 2–14)
NEUTROPHILS # BLD AUTO: 6.16 K/UL — SIGNIFICANT CHANGE UP (ref 1.8–7.4)
NEUTROPHILS NFR BLD AUTO: 78.7 % — HIGH (ref 43–77)
OPIATES UR-MCNC: NEGATIVE — SIGNIFICANT CHANGE UP
PCO2 BLDV: 43 MMHG — HIGH (ref 39–42)
PCP SPEC-MCNC: SIGNIFICANT CHANGE UP
PCP UR-MCNC: NEGATIVE — SIGNIFICANT CHANGE UP
PH BLDV: 7.36 — SIGNIFICANT CHANGE UP (ref 7.32–7.43)
PHOSPHATE SERPL-MCNC: 3.1 MG/DL — SIGNIFICANT CHANGE UP (ref 2.5–4.5)
PLATELET # BLD AUTO: 156 K/UL — SIGNIFICANT CHANGE UP (ref 150–400)
PO2 BLDV: 39 MMHG — SIGNIFICANT CHANGE UP (ref 25–45)
POTASSIUM SERPL-MCNC: 3.6 MMOL/L — SIGNIFICANT CHANGE UP (ref 3.5–5.3)
POTASSIUM SERPL-SCNC: 3.6 MMOL/L — SIGNIFICANT CHANGE UP (ref 3.5–5.3)
PROT SERPL-MCNC: 7 GM/DL — SIGNIFICANT CHANGE UP (ref 6–8.3)
RBC # BLD: 4.14 M/UL — SIGNIFICANT CHANGE UP (ref 3.8–5.2)
RBC # FLD: 14.1 % — SIGNIFICANT CHANGE UP (ref 10.3–14.5)
RSV RNA NPH QL NAA+NON-PROBE: SIGNIFICANT CHANGE UP
SALICYLATES SERPL-MCNC: <1.7 MG/DL — LOW (ref 2.8–20)
SAO2 % BLDV: 69 % — SIGNIFICANT CHANGE UP (ref 67–88)
SARS-COV-2 IGG+IGM SERPL QL IA: >250 U/ML — HIGH
SARS-COV-2 IGG+IGM SERPL QL IA: POSITIVE
SARS-COV-2 RNA SPEC QL NAA+PROBE: SIGNIFICANT CHANGE UP
SODIUM SERPL-SCNC: 138 MMOL/L — SIGNIFICANT CHANGE UP (ref 135–145)
THC UR QL: POSITIVE — SIGNIFICANT CHANGE UP
WBC # BLD: 7.82 K/UL — SIGNIFICANT CHANGE UP (ref 3.8–10.5)
WBC # FLD AUTO: 7.82 K/UL — SIGNIFICANT CHANGE UP (ref 3.8–10.5)

## 2025-01-20 PROCEDURE — 99285 EMERGENCY DEPT VISIT HI MDM: CPT

## 2025-01-20 PROCEDURE — 86769 SARS-COV-2 COVID-19 ANTIBODY: CPT

## 2025-01-20 PROCEDURE — 80307 DRUG TEST PRSMV CHEM ANLYZR: CPT

## 2025-01-20 PROCEDURE — 83036 HEMOGLOBIN GLYCOSYLATED A1C: CPT

## 2025-01-20 PROCEDURE — 36415 COLL VENOUS BLD VENIPUNCTURE: CPT

## 2025-01-20 PROCEDURE — 80061 LIPID PANEL: CPT

## 2025-01-20 PROCEDURE — 0241U: CPT

## 2025-01-20 PROCEDURE — 84443 ASSAY THYROID STIM HORMONE: CPT

## 2025-01-20 RX ORDER — CARVEDILOL 6.25 MG
12.5 TABLET ORAL EVERY 12 HOURS
Refills: 0 | Status: DISCONTINUED | OUTPATIENT
Start: 2025-01-20 | End: 2025-01-21

## 2025-01-20 RX ORDER — LOSARTAN POTASSIUM 100 MG
50 TABLET ORAL DAILY
Refills: 0 | Status: DISCONTINUED | OUTPATIENT
Start: 2025-01-20 | End: 2025-01-21

## 2025-01-20 NOTE — ED ADULT NURSE NOTE - OBJECTIVE STATEMENT
Pt presents to the ED from home by ambulance with c/o overdose. Pt has hx of depression. EMS reports the patient took an unknown amount of clonazepam about 30-45 minutes ago. pt states that she took 1/2 a bottle. about 15 tablets. unsure of dose. Pt endorses SI. 1:1 initiated. Pt placed into trauma 3. EKG to be completed and capnography to be placed. all belongings removed from the bedside.

## 2025-01-20 NOTE — ED BEHAVIORAL HEALTH ASSESSMENT NOTE - DETAILS
[place] See Text Dr. Solorzano aware HTN Unable to endorse Lives separately [facility] ED tem aware

## 2025-01-20 NOTE — ED ADULT NURSE REASSESSMENT NOTE - NS ED NURSE REASSESS COMMENT FT1
Patient verbalized that she would not like her  at bedside. Security notified.    Monica's contact number: [phone]

## 2025-01-20 NOTE — ED ADULT TRIAGE NOTE - CHIEF COMPLAINT QUOTE
Pt sleeping in bed no distress, even chest rise and fall. Sitter at bedside, will continue to monitor   Pt coming in from home by ambulance with c/o overdose. Pt has hx of depression. EMS reports the patient took an unknown amount of clonazepam about 30-45 minutes ago. Pt endorses SI to her family. 1:1 initiated. Pt placed into trauma 3. EKG to be completed and capnography to be placed.

## 2025-01-20 NOTE — BH PATIENT PROFILE - HOME MEDICATIONS
diphenhydrAMINE , 1 tab(s) orally once a day as needed for  allergy symptoms  carvedilol 25 mg oral tablet , 1 tab(s) orally 2 times a day  losartan 100 mg oral tablet , 1 tab(s) orally once a day  hydroCHLOROthiazide 12.5 mg oral tablet , 1 tab(s) orally once a day  ARIPiprazole 2 mg oral tablet , 1 tab(s) orally 2 times a day  clonazePAM 0.5 mg oral tablet , 1 tab(s) orally 2 times a day

## 2025-01-20 NOTE — ED BEHAVIORAL HEALTH ASSESSMENT NOTE - SUMMARY
Patient a 63 y/o  retired female, domiciled with her , 2 adult children, past Psychiatric Hx of MDD for many years, hx of prior SA, most recently OD on aspiri, most recently admitted at [facility], medically has HTN ws BIB/EMS due to above reasons.    Patient in bed AAOx3, looks drowsy and in slow voice she endorsed that she spoke with Dr. Urrutia her Psychiatrist today AM and added that " She does not feel good" and added that she feels anxious, and not able to sit right or stay in one position, Dr. Urrutia did not say anything, but added that she has a planned visit on Wednesday and the next moment she OD on prescribed Klonopin 0.5 mg and swallowed more than 20 pills and was brought to ED at .  Patient most recently OD on aspirin, unsure how many she took and was admitted at  [facility] for 1-2 weeks and later was admitted at [facility]. for  week. She added that she did very well on Paxil 20 mg daily for 20+ years, and when she was doing well on Paxil, she decided on her own to stop the Paxil and since then for past 2 years things are not the same, she is not on the right dosage of meds, has been struggling with Depression to have the correct dosage of meds for stability. While at [place] she was on Paxil trial up-to 30 mg daily, and later was switched to Lexapro 15 mg daily and was discharged. Writer also spoke with Renan Barnes and he added that he is in the process of changing her meds and plans to re-start Paxil without any anti-depressants at this time.  and her family is concerned because of her behavior and her death wishes. No A/V/H or paranoid beliefs.    Medically has HTN controlled with Carvedilol 12, 5 mf Q-12  Cozaar 50 mg daily    Plan: Admit to 5-N             Legals--9.39            Start PRN meds only.

## 2025-01-20 NOTE — PHARMACOTHERAPY INTERVENTION NOTE - COMMENTS
Medication reconciliation complete.  Home medications reviewed at bedside with patient and family member with provided list and confirmed against Dr. First med .  Patient reports an unknown allergy to penicillin (from when she was little).    Home Medications:  ARIPiprazole 2 mg oral tablet: 1 tab(s) orally 2 times a day (20 Jan 2025 16:51)  carvedilol 25 mg oral tablet: 1 tab(s) orally 2 times a day (20 Jan 2025 16:52)  clonazePAM 0.5 mg oral tablet: 1 tab(s) orally 2 times a day (20 Jan 2025 16:51)  diphenhydrAMINE: 1 tab(s) orally once a day as needed for  allergy symptoms (20 Jan 2025 16:52)  hydroCHLOROthiazide 12.5 mg oral tablet: 1 tab(s) orally once a day (20 Jan 2025 16:52)  losartan 100 mg oral tablet: 1 tab(s) orally once a day (20 Jan 2025 16:52)

## 2025-01-20 NOTE — ED ADULT NURSE NOTE - NS ED NOTE ABUSE SUSPICION NEGLECT YN
No CONSTITUTIONAL: Well-appearing; well-nourished; in no apparent distress.   EYES: PERRL; EOM intact.   CARDIOVASCULAR: Normal S1, S2; no murmurs, rubs, or gallops.   RESPIRATORY: Normal chest excursion with respiration; breath sounds clear and equal bilaterally; no wheezes, rhonchi, or rales.  GI/: + suprapubic tenderness. Normal bowel sounds; non-distended; no palpable organomegaly.   MS: No evidence of trauma or deformity to extremities.   SKIN: Normal for age and race; warm; dry; good turgor; no apparent lesions or exudate.   NEURO/PSYCH: A & O x 4; grossly unremarkable.

## 2025-01-20 NOTE — ED PROVIDER NOTE - PROGRESS NOTE DETAILS
Tox consulted. Med rec:   Carvedilol 25 mg BID  Losartan HCTZ 100/12.5 mg QD  Aripiprazole 2 mg BID  Clonazepam 0.5 mg BID Psych consulted. Saleh, DO: Patient signed out to me by Dr. Grant. Patient is here with intentional overdose. Plan of care is to reassess at 8 PM. Disposition is involuntary admission once cleared by toxicology patient reassessed, able to hold conversation but still slightly lethargic.  Ambulating with a steady gait.  Vital signs stable.  Stable for transfer to [facility].  Toxicology fellow updated regarding change in status.

## 2025-01-20 NOTE — ED BEHAVIORAL HEALTH ASSESSMENT NOTE - TELEPSYCHIATRY?
04/04/18 1001   Psycho Education   Type of Intervention structured groups   Response participates, initiates socially appropriate   Hours 1   Treatment Detail boundaries      No

## 2025-01-20 NOTE — BH PATIENT PROFILE - FUNCTIONAL ASSESSMENT - BASIC MOBILITY 2.
Annual physical exam  Assessment & Plan:  Annual exam today.   Checking lab work   Re-schedule colonoscopy   Orders:  -     Lipid Panel; Future  -     Comprehensive Metabolic Panel; Future  -     Hemoglobin A1C; Future  -     TSH with Reflex to FT4 ([facility] Only); Future  -     CBC; Future  2. Prediabetes  Assessment & Plan:  Chronic, due for labs. Controlled on metformin 500 mg daily, continue.       Discussed medications with patient, who voiced understanding of their use and indications. All questions answered.    No follow-ups on file.      Electronically signed by ARLENE Cherry CNP on 4/3/2024 at 8:13 AM  
4 = No assist / stand by assistance

## 2025-01-20 NOTE — BH PATIENT PROFILE - FALL HARM RISK - RISK INTERVENTIONS
Communicate Fall Risk and Risk Factors to all staff, patient, and family/Monitor for mental status changes/Monitor gait and stability/Orthostatic vital signs/Sit up slowly, dangle for a short time, stand at bedside before walking/Visual Cue: Yellow wristband/Non-slip footwear when patient is out of bed/Physically safe environment - no spills, clutter or unnecessary equipment/Purposeful Proactive Rounding

## 2025-01-20 NOTE — ED ADULT NURSE REASSESSMENT NOTE - NS ED NURSE REASSESS COMMENT FT1
Plan of care assumed from SHARRON Moody. Pt. resting in stretcher, endorsing no complaints. Vital signs obtained and documented; pt. made aware of plan of care. Plan is to monitor patient medically until 2000 and then escort patient voluntarily to 5N.

## 2025-01-20 NOTE — ED PROVIDER NOTE - OBJECTIVE STATEMENT
64F hx of suicide attempts presenting with another suicide attempt. Took "half a bottle" of her Klonopin today. The pills are 0.5 mg. Upon asking the  (at home) over the phone, he believes she took over 20 pills. Patient denies taking anything else. Denies any physical discomfort. No chest pain, dyspnea, abdominal pain, nausea, vomiting, headache.

## 2025-01-20 NOTE — ED PROVIDER NOTE - PHYSICAL EXAMINATION
GENERAL: non-toxic appearing, in NAD  HEAD: atraumatic, normocephalic  EYES: vision grossly intact, no conjunctivitis or discharge  EARS: hearing grossly intact  NOSE: no nasal discharge, epistaxis   CARDIAC: RRR, normal S1S2,  no appreciable murmurs, no cyanosis, cap refill < 2 seconds  PULM: no respiratory distress, oxygen saturation on RA wnl, CTAB, no crackles, rales, rhonchi, or wheezing  GI: abdomen nondistended, soft, nontender, no guarding or rebound tenderness, no palpable masses  NEURO: slurred speech, awake and alert, follows commands, normal speech, PERRLA, EOMI, no focal motor or sensory deficits  MSK: spine appears normal, no joint swelling or erythema, no gross deformities of extremities  EXT: no peripheral edema, calf tenderness, redness or swelling  SKIN: warm, dry, and intact, no rashes  PSYCH: appropriate mood and affect

## 2025-01-20 NOTE — ED ADULT NURSE REASSESSMENT NOTE - NS ED NURSE REASSESS COMMENT FT1
As per MD Saleh, pt. is medically cleared to proceed to 5N. Vital signs obtained and documented. Pt. compliant with care.

## 2025-01-20 NOTE — ED BEHAVIORAL HEALTH ASSESSMENT NOTE - NSBHMSESPABN_PSY_A_CORE
[de-identified] : Sveta is a 61 year old female who presents today for a pre operative visit for a right TKA on 6.18.24 at .   Chief Complaint / Diagnosis: Knee Pain / Osteoarthritis Planned Procedure: Total Knee Replacement, Right Knee.   Summary of Pre-operative Consultation:   - The patient is a 61-year-old with right knee pain from osteoarthritis.  - The patients pain is worsening and impacting function and quality of life. - Non-surgical treatments and non-arthroplasty procedures have not provided sufficient pain or symptom relief from the knee arthritis. - The planned procedure the patient was seen here for today was a total knee replacement.  (Quang Robotic Arm Assisted). - The procedure will be performed at [facility] in [place]. - The procedure will be on 6.18.2024 - The procedure will be performed as an outpatient. - The medical clearance was completed - The patient has completed all the steps to prepare for the procedure. - The patient has been cleared for surgery. - We reviewed the knee imaging today and confirmed the physical exam findings. - We discussed procedure details including the benefits and risks and the post-operative process. - History of dyslipidemia. - History of open gall bladder surgery. -History of oophorectomy and hysterectomy - Only medicine is Lipitor - No allergies - 5'5" 240 lbs.    Slowed rate/Decreased productivity

## 2025-01-20 NOTE — ED BEHAVIORAL HEALTH ASSESSMENT NOTE - HPI (INCLUDE ILLNESS QUALITY, SEVERITY, DURATION, TIMING, CONTEXT, MODIFYING FACTORS, ASSOCIATED SIGNS AND SYMPTOMS)
Patient a 63 y/o  retired female, domiciled with her , 2 adult children, past Psychiatric Hx of MDD for many years, hx of prior SA, most recently OD on aspiri, most recently admitted at [facility], medically has HTN ws BIB/EMS due to above reasons.    Patient in bed AAOx3, looks drowsy and in slow voice she endorsed that she spoke with Dr. Urrutia her Psychiatrist today AM and added that " She does not feel good" and added that she feels anxious, and not able to sit right or stay in one position, Dr. Urrutia did not say anything, but added that she has a planned visit on Wednesday and the next moment she OD on prescribed Klonopin 0.5 mg and swallowed more than 20 pills and was brought to ED at .  Patient most recently OD on aspirin, unsure how many she took and was admitted at  [facility] for 1-2 weeks and later was admitted at [facility]. for  week. She added that she did very well on Paxil 20 mg daily for 20+ years, and when she was doing well on Paxil, she decided on her own to stop the Paxil and since then for past 2 years things are not the same, she is not on the right dosage of meds, has been struggling with Depression to have the correct dosage of meds for stability. While at [place] she was on Paxil trial up-to 30 mg daily, and later was switched to Lexapro 15 mg daily and was discharged. Writer also spoke with Renan Barnes and he added that he is in the process of changing her meds and plans to re-start Paxil without any anti-depressants at this time. No A/V/H reported, no overt paranoia noted  Medically has HTN controlled with Carvedilol 12, 5 mf Q-12  Cozaar 50 mg daily

## 2025-01-20 NOTE — ED PROVIDER NOTE - DISPOSITION TYPE
Principal Discharge DX:	Hematoma of labia majora  Goal:	return to baseline level of activity  Instructions for follow-up, activity and diet:	Regular diet as tolerated, regular activity as tolerated, no heavy lifting for first two weeks.  Call your provider if you experience fevers, chills, worsening abdominal pain, inability to urinate or worsening vaginal bleeding.  Follow up with your provider in 2 weeks.
ADMIT

## 2025-01-20 NOTE — ED ADULT NURSE NOTE - CHIEF COMPLAINT QUOTE
Pt coming in from home by ambulance with c/o overdose. Pt has hx of depression. EMS reports the patient took an unknown amount of clonazepam about 30-45 minutes ago. Pt endorses SI to her family. 1:1 initiated. Pt placed into trauma 3. EKG to be completed and capnography to be placed.

## 2025-01-21 LAB
A1C WITH ESTIMATED AVERAGE GLUCOSE RESULT: 5.7 % — HIGH (ref 4–5.6)
CHOLEST SERPL-MCNC: 159 MG/DL — SIGNIFICANT CHANGE UP
ESTIMATED AVERAGE GLUCOSE: 117 MG/DL — HIGH (ref 68–114)
HDLC SERPL-MCNC: 44 MG/DL — LOW
LIPID PNL WITH DIRECT LDL SERPL: 79 MG/DL — SIGNIFICANT CHANGE UP
NON HDL CHOLESTEROL: 114 MG/DL — SIGNIFICANT CHANGE UP
TRIGL SERPL-MCNC: 212 MG/DL — HIGH
TSH SERPL-MCNC: 1.79 UU/ML — SIGNIFICANT CHANGE UP (ref 0.34–4.82)

## 2025-01-21 PROCEDURE — 99223 1ST HOSP IP/OBS HIGH 75: CPT

## 2025-01-21 PROCEDURE — 99222 1ST HOSP IP/OBS MODERATE 55: CPT

## 2025-01-21 RX ORDER — CARVEDILOL 6.25 MG
25 TABLET ORAL EVERY 12 HOURS
Refills: 0 | Status: DISCONTINUED | OUTPATIENT
Start: 2025-01-21 | End: 2025-01-29

## 2025-01-21 RX ORDER — PAROXETINE HYDROCHLORIDE 10 MG/1
10 TABLET, FILM COATED ORAL ONCE
Refills: 0 | Status: COMPLETED | OUTPATIENT
Start: 2025-01-21 | End: 2025-01-21

## 2025-01-21 RX ORDER — ACETAMINOPHEN 160 MG/5ML
1000 SUSPENSION ORAL EVERY 6 HOURS
Refills: 0 | Status: DISCONTINUED | OUTPATIENT
Start: 2025-01-21 | End: 2025-01-29

## 2025-01-21 RX ORDER — LOSARTAN POTASSIUM 100 MG
100 TABLET ORAL DAILY
Refills: 0 | Status: DISCONTINUED | OUTPATIENT
Start: 2025-01-21 | End: 2025-01-29

## 2025-01-21 RX ORDER — PAROXETINE HYDROCHLORIDE 10 MG/1
10 TABLET, FILM COATED ORAL DAILY
Refills: 0 | Status: DISCONTINUED | OUTPATIENT
Start: 2025-01-22 | End: 2025-01-22

## 2025-01-21 RX ORDER — ACETAMINOPHEN 160 MG/5ML
1000 SUSPENSION ORAL EVERY 6 HOURS
Refills: 0 | Status: DISCONTINUED | OUTPATIENT
Start: 2025-01-21 | End: 2025-01-21

## 2025-01-21 RX ADMIN — PAROXETINE HYDROCHLORIDE 10 MILLIGRAM(S): 10 TABLET, FILM COATED ORAL at 11:45

## 2025-01-21 RX ADMIN — Medication 12.5 MILLIGRAM(S): at 09:08

## 2025-01-21 RX ADMIN — Medication 50 MILLIGRAM(S): at 09:09

## 2025-01-21 RX ADMIN — ACETAMINOPHEN 1000 MILLIGRAM(S): 160 SUSPENSION ORAL at 04:17

## 2025-01-21 RX ADMIN — Medication 25 MILLIGRAM(S): at 20:32

## 2025-01-21 RX ADMIN — ACETAMINOPHEN 1000 MILLIGRAM(S): 160 SUSPENSION ORAL at 04:45

## 2025-01-21 NOTE — BH SOCIAL WORK INITIAL PSYCHOSOCIAL EVALUATION - NSBHSACONSEQUENCE_PSY_A_CORE FT
Pt. denies any treatment hx. She describes self-medicating with THC but notes that did work to help her sleep.

## 2025-01-21 NOTE — BH SOCIAL WORK INITIAL PSYCHOSOCIAL EVALUATION - NSCMSPTSTRENGTHS_PSY_ALL_CORE
Courageous/Expressive of emotions/Financial stability/Highly motivated for treatment/Successful coping history/Supportive family

## 2025-01-21 NOTE — BH SOCIAL WORK INITIAL PSYCHOSOCIAL EVALUATION - NSBHSUBSTANCEHX_PSY_ALL_CORE
Per chart, pt. and family both denied pt. with past/present substance use/abuse/dependence./No Per chart, pt. and family both denied pt. with past/present substance use/abuse/dependence./Yes...

## 2025-01-21 NOTE — BH INPATIENT PSYCHIATRY ASSESSMENT NOTE - HPI (INCLUDE ILLNESS QUALITY, SEVERITY, DURATION, TIMING, CONTEXT, MODIFYING FACTORS, ASSOCIATED SIGNS AND SYMPTOMS)
01/21/2025: Patient was seen today with DELGADO Wheatley. She is off 1:1 started overnight for safety. She is eager to start Paxil 10 mg x 1 dose to which she responded very well in the past. She is unsure why she took-ok off the Paxil as she was doing very well. She denied current S/H/I/P. discussed the current treatment and plans to re-start Paxil. She c/o restlessness and advised to stay away from Abilify which may cause restlessness.    Patient a 63 y/o  retired female, domiciled with her , 2 adult children, past Psychiatric Hx of MDD for many years, hx of prior SA, most recently OD on Aspirin, most recently admitted at [facility], medically has HTN ws BIB/EMS due to above reasons.    Patient in bed AAOx3, looks drowsy and in slow voice she endorsed that she spoke with Dr. Urrutia her Psychiatrist today AM and added that " She does not feel good" and added that she feels anxious, and not able to sit right or stay in one position, Dr. Urrutia did not say anything, but added that she has a planned visit on Wednesday and the next moment she OD on prescribed Klonopin 0.5 mg and swallowed more than 20 pills and was brought to ED at .  Patient most recently OD on aspirin, unsure how many she took and was admitted at  [facility] for 1-2 weeks and later was admitted at [facility]. for  week. She added that she did very well on Paxil 20 mg daily for 20+ years, and when she was doing well on Paxil, she decided on her own to stop the Paxil and since then for past 2 years things are not the same, she is not on the right dosage of meds, has been struggling with Depression to have the correct dosage of meds for stability. While at [place] she was on Paxil trial up-to 30 mg daily, and later was switched to Lexapro 15 mg daily and was discharged. Writer also spoke with Renan Barnes and he added that he is in the process of changing her meds and plans to re-start Paxil without any anti-depressants at this time. No A/V/H reported, no overt paranoia noted  Medically has HTN controlled with Carvedilol 12, 5 mf Q-12  Cozaar 50 mg daily

## 2025-01-21 NOTE — BH INPATIENT PSYCHIATRY ASSESSMENT NOTE - NSTREATMENTCERTY_PSY_ALL_CORE
yes
That inpatient services furnished since the previous certification or recertification were, and continue to be required: for treatment that could reasonably be expected to improve the patient's condition; or, for diagnostic study;    That the hospital records show that the services furnished were: intensive treatment services; admission and related services necessary for diagnostic study or equivalent services;    That the patient continues to need, on a daily basis active inpatient treatment furnished directly by or requiring the supervision of inpatient psychiatric facility personnel.

## 2025-01-21 NOTE — BH SOCIAL WORK INITIAL PSYCHOSOCIAL EVALUATION - OTHER PAST PSYCHIATRIC HISTORY (INCLUDE DETAILS REGARDING ONSET, COURSE OF ILLNESS, INPATIENT/OUTPATIENT TREATMENT)
PPHx of long hx. of MDD, multiple prior inpt. (last known [place] and [facility] within past 30 days for SA/SI), multiple SA via OD including last within 30 days, no known past/present AH/VH/HI/psychosis, no known hx. of substance abuse.   Pt. reports PPHx of long hx. of MDD and anxiety first diagnosed at 13yo and started on medication and hospitalized, 4 prior inpt. admits for depression/anxiety ([facility] 13yo, [place] in her 20s, [place] "many years ago," and [place] 3 weeks ago for SA via OD, 2 reported SA via OD including last within 30 days, denied prior AH/VH/HI/aquilino/psychosis, denied prior substance abuse.     Pt. reports presents for SA via OD of "half a bottle" of her prescribed Klonopin RX. She reports feeling "not right" and but unsure of specific trigger. Pt. notes recent custodial within the past year, loss of her brother to COVID during height of pandemic, chronic pain from ovarian cyst which cannot be operated on, and difficulty eating due to GI issues which have not been found a cause or treatment for many years, and also stopping her Paxil medication which she had been stable on for many years as possible triggers to worsening depression/anxiety. Pt. reporting not feeling "herself" for at least 3 years (same time she reports stopping her Paxil RX since she felt stable). Pt. reports PPHx of long hx. of MDD and anxiety first diagnosed at 15yo and started on medication and hospitalized, 4 prior inpt. admits for depression/anxiety ([facility] 15yo, [place] in her 20s, [place] "many years ago," and [place] 3 weeks ago for SA via OD, 2 reported SA via OD including last within 30 days, denied prior AH/VH/HI/aquilino/psychosis, denied prior substance abuse.     Pt. reports presents for SA via OD of "half a bottle" of her prescribed Klonopin RX. She reports feeling "not right" and but unsure of specific trigger. Pt. notes recent [facility]DC within the past year, loss of her brother to COVID during height of pandemic, chronic pain from ovarian cyst which cannot be operated on, and difficulty eating due to GI issues which have not been found a cause or treatment for many years, and also stopping her Paxil medication which she had been stable on for many years as possible triggers to worsening depression/anxiety. Pt. reporting not feeling "herself" for at least 3 years (same time she reports stopping her Paxil RX since she felt stable). She describes baseline of being "happy and social" with friends and family and that for past 3 years this is not her.

## 2025-01-21 NOTE — BH SOCIAL WORK INITIAL PSYCHOSOCIAL EVALUATION - NSBHABUSECOMMENTFT_PSY_ALL_CORE
Pt. denies all past/present abuse/neglect and denies any safety concerns at this time. She describes some marital conflict with  in not always being emotionally supportive but denies as abuse at this time.

## 2025-01-21 NOTE — BH SOCIAL WORK INITIAL PSYCHOSOCIAL EVALUATION - NSBHSUPPORTOTHER_PSY_ALL_CORE
Yes Pt. also identifies her sister Crystal ph. [phone] as support and consents to involving her in care as needed/Yes

## 2025-01-21 NOTE — BH SOCIAL WORK INITIAL PSYCHOSOCIAL EVALUATION - NSBHHOUSECOMMENTFT_PSY_ALL_CORE
Pt. resides in private residence with  Pt. resides in private residence with : [address] [place] [address]

## 2025-01-21 NOTE — BH INPATIENT PSYCHIATRY ASSESSMENT NOTE - NSBHASSESSSUMMFT_PSY_ALL_CORE
Patient a 65 y/o  retired female, domiciled with her , 2 adult children, past Psychiatric Hx of MDD for many years, hx of prior SA, most recently OD on Aspirin, most recently admitted at [facility], medically has HTN ws BIB/EMS due to above reasons.    Patient in bed AAOx3, looks drowsy and in slow voice she endorsed that she spoke with Dr. Urrutia her Psychiatrist today AM and added that " She does not feel good" and added that she feels anxious, and not able to sit right or stay in one position, Dr. Urrutia did not say anything, but added that she has a planned visit on Wednesday and the next moment she OD on prescribed Klonopin 0.5 mg and swallowed more than 20 pills and was brought to ED at .  Patient most recently OD on aspirin, unsure how many she took and was admitted at  [facility] for 1-2 weeks and later was admitted at [facility]. for  week. She added that she did very well on Paxil 20 mg daily for 20+ years, and when she was doing well on Paxil, she decided on her own to stop the Paxil and since then for past 2 years things are not the same, she is not on the right dosage of meds, has been struggling with Depression to have the correct dosage of meds for stability. While at [place] she was on Paxil trial up-to 30 mg daily, and later was switched to Lexapro 15 mg daily and was discharged. Writer also spoke with Renan Barnes and he added that he is in the process of changing her meds and plans to re-start Paxil without any anti-depressants at this time. No A/V/H reported, no overt paranoia noted  Medically has HTN controlled with Carvedilol 12. 5 mg Q-12  Cozaar 50 mg daily    Acute Factors-SA, due to ongoing depression.   Mitigating--Admitted to [facility], and meds started  Protective Factors-Family Supportive, In treatment and no drug abuse hx  Chronic-SI with prior SA

## 2025-01-21 NOTE — BH SOCIAL WORK INITIAL PSYCHOSOCIAL EVALUATION - NSBHLIFEGOAL_PSY_ALL_CORE
Pt. reports interest in increasing social activities since retiring including possibly joining gym classes

## 2025-01-21 NOTE — BH INPATIENT PSYCHIATRY ASSESSMENT NOTE - CURRENT MEDICATION
MEDICATIONS  (STANDING):  carvedilol 25 milliGRAM(s) Oral every 12 hours  losartan 100 milliGRAM(s) Oral daily    MEDICATIONS  (PRN):  acetaminophen     Tablet .. 1000 milliGRAM(s) Oral every 6 hours PRN Temp greater or equal to 38C (100.4F), Mild Pain (1 - 3)  LORazepam     Tablet 0.5 milliGRAM(s) Oral every 12 hours PRN Anxiety

## 2025-01-21 NOTE — BH SOCIAL WORK INITIAL PSYCHOSOCIAL EVALUATION - NSHIGHRISKBEHFT_PSY_ALL_CORE
Pt. presents s/p OD on prescribed Klonopin 0.5 mg and swallowed more than 20 pills and was brought to ED at  by family. Pt. with multiple SA prior including SA via OD on aspirin requiring admit to [facility] 1-2 weeks.     Per chart, pt. took self off of Paxil medication without consultation of doctor.  Pt. presents s/p OD on prescribed Klonopin 0.5 mg and swallowed more than 20 pills and was brought to ED at  by family. Pt. with 2 reports SA prior including SA via OD on aspirin requiring admit to [facility] approx. 3 weeks ago. Pt. with notable difficulty identifying triggers to SI and also with difficulty identifying coping skills    Pt. reports she took self off of Paxil medication without consultation of doctor because she was feeling good for many years and did not think she needed it any longer. Pt. understands may have contributed to decline in BH since off RX.

## 2025-01-21 NOTE — H&P ADULT - NSHPPHYSICALEXAM_GEN_ALL_CORE
PEx  T(C): 36.4 (01-21-25 @ 07:17), Max: 36.8 (01-20-25 @ 19:16)  HR: 69 (01-20-25 @ 20:41) (69 - 85)  BP: 151/87 (01-20-25 @ 20:41) (125/71 - 151/87)  RR: 16 (01-21-25 @ 07:17) (16 - 22)  SpO2: 100% (01-21-25 @ 07:17) (96% - 100%)  Wt(kg): --  General:     Well appearing, well nourished in no distress, no identifying marks , scars, or tattoos.  Skin: no rash or prominent lesions  Head: normocephalic, atraumatic     Sinuses: non-tender  Nose: no external lesions, mucosa non-inflamed, septum and turbinates normal  Throat: no erythema, exudates or lesions.  Neck: Supple without lymphadenopathy. Thyroid no thyromegaly, no palpable thyroid nodules, no palpable nodules or masses, carotid arteries no bruits.   Breasts: No palpable masses or lesions.  Heart: RRR, no murmur or gallop.  Normal S1, S2.  No S3, S4.   Lungs: CTA bilaterally, no wheezes, rhonchi, rales.  Breathing unlabored.   Chest wall: Normal insp   Abdomen:  Soft, NT/ND, normal bowel sounds, no HSM, no masses.  No peritoneal signs.   Back: spine normal without deformity or tenderness.  Normal ROM   : Exam normal.  no inguinal hernias.  Extremities: No deformities, clubbing, cyanosis, or edema.  Musculoskeletal: Normal gait and station. No decreased range of motion, instability, atrophy or abnormal strength or tone in the head, neck, spine, ribs, pelvis or extremities.   Neurologic: CN 2-12 normal. Sensation to pain, touch and proprioception normal. DTRs normal in upper and lower extremities. No pathologic reflexes.  Motor normal.  Psychiatric: Oriented X3, intact recent and remote memory, judgement and insight, normal mood and affect. PEx  T(C): 36.4 (01-21-25 @ 07:17), Max: 36.8 (01-20-25 @ 19:16)  HR: 69 (01-20-25 @ 20:41) (69 - 85)  BP: 151/87 (01-20-25 @ 20:41) (125/71 - 151/87)  RR: 16 (01-21-25 @ 07:17) (16 - 22)  SpO2: 100% (01-21-25 @ 07:17) (96% - 100%)  Wt(kg): --  General:  NAD.  seen on 5N  Skin: no rash or prominent lesions  Head: normocephalic, atraumatic     Sinuses: non-tender  Nose: no external lesions, mucosa non-inflamed, septum and turbinates normal  Throat: no erythema, exudates or lesions.  Neck: Supple without lymphadenopathy. Thyroid no thyromegaly, no palpable thyroid nodules, no palpable nodules or masses, carotid arteries no bruits.   Breasts: No palpable masses or lesions.  Heart: RRR, no murmur or gallop.  Normal S1, S2.  No S3, S4.   Lungs: CTA bilaterally, no wheezes, rhonchi, rales.  Breathing unlabored.   Chest wall: Normal insp   Abdomen:  Soft, NT/ND, normal bowel sounds, no HSM, no masses.  No peritoneal signs.   Back: spine normal without deformity or tenderness.  Normal ROM   : Exam normal.  no inguinal hernias.  Extremities: No deformities, clubbing, cyanosis, or edema.  Musculoskeletal: Normal gait and station. No decreased range of motion, instability, atrophy or abnormal strength or tone in the head, neck, spine, ribs, pelvis or extremities.   Neurologic: CN 2-12 normal. Sensation to pain, touch and proprioception normal. DTRs normal in upper and lower extremities. No pathologic reflexes.  Motor normal.  Psychiatric: depressed affect.

## 2025-01-21 NOTE — BH INPATIENT PSYCHIATRY ASSESSMENT NOTE - NSBHCHARTREVIEWVS_PSY_A_CORE FT
Vital Signs Last 24 Hrs  T(C): 36.4 (01-21-25 @ 07:17), Max: 36.8 (01-20-25 @ 19:16)  T(F): 97.6 (01-21-25 @ 07:17), Max: 98.2 (01-20-25 @ 19:16)  HR: 69 (01-20-25 @ 20:41) (69 - 85)  BP: 151/87 (01-20-25 @ 20:41) (139/90 - 151/87)  BP(mean): 106 (01-20-25 @ 20:41) (92 - 106)  RR: 16 (01-21-25 @ 07:17) (16 - 22)  SpO2: 100% (01-21-25 @ 07:17) (96% - 100%)    Orthostatic VS  01-21-25 @ 07:17  Lying BP: --/-- HR: --  Sitting BP: 154/91 HR: 87  Standing BP: 147/93 HR: 86  Site: upper right arm  Mode: electronic  Orthostatic VS  01-20-25 @ 22:13  Lying BP: --/-- HR: --  Sitting BP: 158/92 HR: 73  Standing BP: 129/80 HR: 86  Site: upper left arm  Mode: electronic

## 2025-01-21 NOTE — H&P ADULT - HISTORY OF PRESENT ILLNESS
63 y/o female with PMHX of HTN, anxiety/ depression and suicide attempts in the past who presented to  with suicide attempt.  Patient has klonopin at home 0.5 mg and took 1/2 bottle ~20 pills.  Patient brought into ER for evaluation by EMS.          PAST MEDICAL & SURGICAL HISTORY:  No pertinent past medical history      No significant past surgical history        FAMILY HISTORY:    Social History:      Allergies:  penicillin (Unknown)   63 y/o female with PMHX of HTN, anxiety/ depression and suicide attempts in the past who presented to  with suicide attempt.  Patient has klonopin at home 0.5 mg and took 1/2 bottle ~20 pills.  Patient brought into ER for evaluation by EMS.  Patient initially lethargic but eventually cleared and went to 5 N.          PAST MEDICAL & SURGICAL HISTORY:  as above      FAMILY HISTORY:  noncontrubutory      Social History:    .    lives at home.    No smoking/ etoh use.        Allergies:  penicillin (Unknown)   65 y/o female with PMHX of HTN, anxiety/ depression and suicide attempts in the past who presented to  with suicide attempt.  Patient has klonopin at home 0.5 mg and took 1/2 bottle ~20 pills.  Patient brought into ER for evaluation by EMS.  Patient initially lethargic but eventually cleared and went to 5 N.        Patient seen on unit.  Feeling okay.  Notes chronic RLQ pain.  Has had this evaluated in the past-- thought to be related to ovarian cyst which they are watching for her.  Otherwise no compalints.        PAST MEDICAL & SURGICAL HISTORY:  as above      FAMILY HISTORY:  noncontrubutory      Social History:    .    lives at home.    No tob/ etoh use.    +Marijuana vapes occasionally.        Allergies:  penicillin (Unknown)

## 2025-01-21 NOTE — BH SOCIAL WORK INITIAL PSYCHOSOCIAL EVALUATION - NSBHSATHC_PSY_A_CORE FT
Pt. reports more recently trying out vaping THC "a few times" to help her with sleep difficulties. Pt. unable to specify when occurred other than within past 3 years. She denied active use.

## 2025-01-21 NOTE — BH SOCIAL WORK INITIAL PSYCHOSOCIAL EVALUATION - NSBHBARRIERS_PSY_ALL_CORE
Non-compliant with treatment/Poor judgement Pt. notes chronic GI issues and pain that have been explored in treatment but remain unresolved. She reports may be related to ongoing depression/anxiety as well./Medical co-morbidities/Non-compliant with treatment/Poor judgement

## 2025-01-21 NOTE — BH INPATIENT PSYCHIATRY ASSESSMENT NOTE - NSBHCHARTREVIEWINVESTIGATE_PSY_A_CORE FT
< from: 12 Lead ECG (01.20.25 @ 14:36) >    Ventricular Rate 73 BPM    Atrial Rate 73 BPM    P-R Interval 140 ms    QRS Duration 78 ms    Q-T Interval 408 ms    QTC Calculation(Bazett) 449 ms    P Axis 30 degrees    R Axis 9 degrees    T Axis 19 degrees    Diagnosis Line Normal sinus rhythm  Cannot rule out Anterior infarct , age undetermined  Abnormal ECG  When compared with ECG of 30-NOV-2011 12:00,  No significant change was found  Confirmed by MD ARIADNA, KAREN ([id number]) on 1/20/2025 3:27:29 PM    < end of copied text >

## 2025-01-21 NOTE — H&P ADULT - NSHPREVIEWOFSYSTEMS_GEN_ALL_CORE
ROS:  General:  No fevers, chills, or unexplained weight loss  Skin: No rash or bothersome skin lesions  Musculoskeletal: No arthalgias, myalgias or joint swelling  Eyes: No visual changes or eye pain  Ears: No hearing loss , otorrhea or ear pain  Nose, Mouth, Throat: No nasal congestion, rhinorrhea, oral lesions, postnasal drip or sore throat  Cardio: No chest pain or palpitations. no lower extremity edema. no syncope. no claudication.   Respiratory: No cough, shortness of breath or wheezing   GI: No diarrhea, constipation, blood in stools, abdominal pain, vomiting or heartburn  : No urinary frequency, hematuria, incontinence, or dysuria  Neurologic: No headaches, parasthesias, confusion, dysarthria or gait instability  Psychiatric:  No anxiety or depression  Lymphatic:  No easy bruising, easy bleeding or swollen glands  Allergic: No itching, sneezing , watery eyes, clear rhinorrhea or recurrent infections ROS:  General:  No fevers, chills, or unexplained weight loss  Skin: No rash or bothersome skin lesions  Musculoskeletal: No arthalgias, myalgias or joint swelling  Eyes: No visual changes or eye pain  Ears: No hearing loss , otorrhea or ear pain  Nose, Mouth, Throat: No nasal congestion, rhinorrhea, oral lesions, postnasal drip or sore throat  Cardio: No chest pain or palpitations. no lower extremity edema. no syncope. no claudication.   Respiratory: No cough, shortness of breath or wheezing   GI: No diarrhea, constipation, blood in stools, abdominal pain, vomiting or heartburn  : No urinary frequency, hematuria, incontinence, or dysuria  Neurologic: No headaches, parasthesias, confusion, dysarthria or gait instability  Psychiatric:  see hpi.  depression.    Lymphatic:  No easy bruising, easy bleeding or swollen glands  Allergic: No itching, sneezing , watery eyes, clear rhinorrhea or recurrent infections ROS:  General:  No fevers, chills, or unexplained weight loss  Skin: No rash or bothersome skin lesions  Musculoskeletal: No arthalgias, myalgias or joint swelling  Eyes: No visual changes or eye pain  Ears: No hearing loss , otorrhea or ear pain  Nose, Mouth, Throat: No nasal congestion, rhinorrhea, oral lesions, postnasal drip or sore throat  Cardio: No chest pain or palpitations. no lower extremity edema. no syncope. no claudication.   Respiratory: No cough, shortness of breath or wheezing   GI: abd pain  : No urinary frequency, hematuria, incontinence, or dysuria  Neurologic: No headaches, parasthesias, confusion, dysarthria or gait instability  Psychiatric:  see hpi.  depression.    Lymphatic:  No easy bruising, easy bleeding or swollen glands  Allergic: No itching, sneezing , watery eyes, clear rhinorrhea or recurrent infections

## 2025-01-21 NOTE — H&P ADULT - ASSESSMENT
63 y/o female with PMHX of HTN, anxiety/ depression and suicide attempts in the past who presented to  with suicide attempt.  Patient has klonopin at home 0.5 mg and took 1/2 bottle ~20 pills.  Patient brought into ER for evaluation by EMS.  Patient initially lethargic but eventually cleared and went to [facility].      #Suicide attempt with klonopin:    Admitted to [facility].    Started on paxil.    Klonopin stopped.    Further mangement as per [facility].      #HTN:    Cont home meds.    Coreg/ losartan.    MED rec also states HCTZ but will hold this and follow BP for now.      #DVT proph:  ambulation.      Patient is medically stable for admission to [address] [place] [address].    We will not actively follow this patient.    Please reconsult PRN.     63 y/o female with PMHX of HTN, anxiety/ depression and suicide attempts in the past who presented to  with suicide attempt.  Patient has klonopin at home 0.5 mg and took 1/2 bottle ~20 pills.  Patient brought into ER for evaluation by EMS.  Patient initially lethargic but eventually cleared and went to [facility].      #Suicide attempt with klonopin:    Admitted to [facility].    Started on paxil.    Klonopin stopped.    Further mangement as per [facility].      #HTN:    Cont home meds.    Coreg/ losartan HCT.      #DVT proph:  ambulation.      Patient is medically stable for admission to [address] [place] [address].    We will not actively follow this patient.    Please reconsult PRN.

## 2025-01-21 NOTE — BH INPATIENT PSYCHIATRY ASSESSMENT NOTE - NSSUIIDEPRODETAIL_PSY_ALL_CORE
Please follow up with Dr. Begrman in 6 weeks.    .Cystoscopy Post Procedure Instructions    Activity:  Resume Normal Activities    Diet: Resume Normal Diet    Common Side Effects: You may experience frequency, burning, or pink-tinged urine.  If blood in urine increases, drinking more fluids and resting will help.  If you are uncomfortable and feel slightly full with need to urinate, take a warm bath or apply heating pad on low setting to lower abdomen.    Medications: Continue medications as prior unless directed.    NotifyDrMarvel Bergman's office if you:   - Have elevated temperature over 101 degrees F.  - Urinate a large amount of blood or clots.  - Have difficulty urinating      Please Notify Dr. Bergman's office at [phone] if you have any questions or concerns.      You were also given a one time dose of Sulfamethoxazole and Trimethoprim DS 800mg/160 mg to prevent infections.    Your new prescriptions were sent to your preferred pharmacy.      No

## 2025-01-21 NOTE — BH SOCIAL WORK INITIAL PSYCHOSOCIAL EVALUATION - NSBHCHILDRESP_PSY_ALL_CORE
Pt. , lives with , has 2 adult children, sister lives near by./No Pt. , lives with  Dagoberto Thorpe, has 2 adult children (son Nahid lives in [place], daughter Arabella lives in [place]), 5 grandchildren and expecting 6th in 2024, 2 living sister (Crystal lives locally, Rachel lives in [place]), brother   from [facility], reports some cousins live locally as well./No

## 2025-01-21 NOTE — BH INPATIENT PSYCHIATRY ASSESSMENT NOTE - NSBHCHARTREVIEWLAB_PSY_A_CORE FT
12.0   7.82  )-----------( 156      ( 20 Jan 2025 14:48 )             35.9   01-20    138  |  110[H]  |  12  ----------------------------<  81  3.6   |  25  |  0.90    Ca    8.9      20 Jan 2025 14:48  Phos  3.1     01-20  Mg     2.2     01-20    TPro  7.0  /  Alb  3.5  /  TBili  0.6  /  DBili  x   /  AST  23  /  ALT  29  /  AlkPhos  41  01-20

## 2025-01-21 NOTE — BH INPATIENT PSYCHIATRY ASSESSMENT NOTE - NSBHMETABOLIC_PSY_ALL_CORE_FT
BMI: BMI (kg/m2): 31.1 (01-20-25 @ 22:13)  HbA1c: A1C with Estimated Average Glucose Result: 5.7 % (01-21-25 @ 08:36)    Glucose:   BP: 151/87 (01-20-25 @ 20:41) (125/71 - 151/87)Vital Signs Last 24 Hrs  T(C): 36.4 (01-21-25 @ 07:17), Max: 36.8 (01-20-25 @ 19:16)  T(F): 97.6 (01-21-25 @ 07:17), Max: 98.2 (01-20-25 @ 19:16)  HR: 69 (01-20-25 @ 20:41) (69 - 85)  BP: 151/87 (01-20-25 @ 20:41) (139/90 - 151/87)  BP(mean): 106 (01-20-25 @ 20:41) (92 - 106)  RR: 16 (01-21-25 @ 07:17) (16 - 22)  SpO2: 100% (01-21-25 @ 07:17) (96% - 100%)    Orthostatic VS  01-21-25 @ 07:17  Lying BP: --/-- HR: --  Sitting BP: 154/91 HR: 87  Standing BP: 147/93 HR: 86  Site: upper right arm  Mode: electronic  Orthostatic VS  01-20-25 @ 22:13  Lying BP: --/-- HR: --  Sitting BP: 158/92 HR: 73  Standing BP: 129/80 HR: 86  Site: upper left arm  Mode: electronic    Lipid Panel: Date/Time: 01-21-25 @ 08:36  Cholesterol, Serum: 159  LDL Cholesterol Calculated: 79  HDL Cholesterol, Serum: 44  Total Cholesterol/HDL Ration Measurement: --  Triglycerides, Serum: 212

## 2025-01-21 NOTE — BH SOCIAL WORK INITIAL PSYCHOSOCIAL EVALUATION - NSBHCHILDEVENTS_PSY_ALL_CORE
Pt. reports born and raised in [place] by mother and father, "the baby" of 4 children (2 sisters and 1 brother). She describes childhood as "really good" and attributes to being very close to her family including extended family of cousins. She denies any abuse/neglect hx. She reports depression/anxiety symptoms began at 13yo seemingly "out of the blue." She reports being admitted to [facility] and started on medications. She denies any family with mental health hx. and that parents were supportive in bringing her to the doctor to address where she was told she has a "chemical imbalance." Pt. reports finishing HS, getting job where she met her  whom she  at 19yo and are together now 42 years./Stable childhood Pt. reports born and raised in [place] by mother and father, "the baby" of 4 children (2 sisters and 1 brother). She describes childhood as "really good" and attributes to being very close to her family including extended family of cousins. She denies any abuse/neglect hx. She reports depression/anxiety symptoms began at 15yo seemingly "out of the blue." She reports being admitted to [facility] and started on medications. She denies any family with mental health hx. and that parents were supportive in bringing her to the doctor to address where she was told she has a "chemical imbalance." Pt. reports finishing HS, getting job where she met her  whom she  at 21yo and are together now 42 years. She states after getting  they moved to [place] where she was very unhappy due to distance from her family. She reports being hospitalized at [place] and that she and  moved back to [place] after 1 year in . Pt. 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 Pt. reports long period of stability some time after 20s where she was maintained on Paxil./Stable childhood/Other (specify)

## 2025-01-21 NOTE — H&P ADULT - NSHPLABSRESULTS_GEN_ALL_CORE
12.0   7.82  )-----------( 156      ( 20 Jan 2025 14:48 )             35.9     01-20    138  |  110[H]  |  12  ----------------------------<  81  3.6   |  25  |  0.90    Ca    8.9      20 Jan 2025 14:48  Phos  3.1     01-20  Mg     2.2     01-20    TPro  7.0  /  Alb  3.5  /  TBili  0.6  /  DBili  x   /  AST  23  /  ALT  29  /  AlkPhos  41  01-20    CAPILLARY BLOOD GLUCOSE          Urinalysis Basic - ( 20 Jan 2025 14:48 )    Color: x / Appearance: x / SG: x / pH: x  Gluc: 81 mg/dL / Ketone: x  / Bili: x / Urobili: x   Blood: x / Protein: x / Nitrite: x   Leuk Esterase: x / RBC: x / WBC x   Sq Epi: x / Non Sq Epi: x / Bacteria: x

## 2025-01-22 PROBLEM — Z78.9 OTHER SPECIFIED HEALTH STATUS: Chronic | Status: INACTIVE | Noted: 2022-07-29 | Resolved: 2025-01-21

## 2025-01-22 PROCEDURE — 99232 SBSQ HOSP IP/OBS MODERATE 35: CPT

## 2025-01-22 RX ORDER — CLONAZEPAM 2 MG
0.25 TABLET ORAL DAILY
Refills: 0 | Status: DISCONTINUED | OUTPATIENT
Start: 2025-01-23 | End: 2025-01-29

## 2025-01-22 RX ORDER — PAROXETINE HYDROCHLORIDE 10 MG/1
20 TABLET, FILM COATED ORAL DAILY
Refills: 0 | Status: DISCONTINUED | OUTPATIENT
Start: 2025-01-23 | End: 2025-01-29

## 2025-01-22 RX ADMIN — Medication 25 MILLIGRAM(S): at 21:46

## 2025-01-22 RX ADMIN — Medication 100 MILLIGRAM(S): at 08:50

## 2025-01-22 RX ADMIN — PAROXETINE HYDROCHLORIDE 10 MILLIGRAM(S): 10 TABLET, FILM COATED ORAL at 08:51

## 2025-01-22 RX ADMIN — Medication 25 MILLIGRAM(S): at 08:51

## 2025-01-22 NOTE — BH INPATIENT PSYCHIATRY PROGRESS NOTE - NSBHASSESSSUMMFT_PSY_ALL_CORE
Patient a 65 y/o  retired female, domiciled with her , 2 adult children, past Psychiatric Hx of MDD for many years, hx of prior SA, most recently OD on Aspirin, most recently admitted at [facility], medically has HTN ws BIB/EMS due to OD on Klonopin .    01/22/2025: Patient seen on inpatient unit today. Patient remains off 1:1 with no current issues. Patient still reports feeling "sad and anxious" however during the interview patient was smiling when appropriate. Patient reports feeling "better today" after having been admitted and receiving [id number] PO for her second dose. Discussed with patient the possibility to increase the Paxil to 20mg PO starting on 1/23/2025. Patient is in agreement to this medication increase stating "that's what I was on before I stopped". Patient reports being engaged in the community and has been reported as visible by staff. Patient also asked about prior restlessness that she had stated was a concern before. Patient currently reports that the restlessness has decreased substantially since yesterday. At this time patient reports wanting to go home but knows she needs to stay and get better. Patient currently denies SIIP, HIIP, A/V hallucinations, and delusions at this time.    Plan:    1: Continue with inpatient admission at this time.    2: Increase Paxil to 20mg PO daily as discussed and agreed to by patient. Patient a 63 y/o  retired female, domiciled with her , 2 adult children, past Psychiatric Hx of MDD for many years, hx of prior SA, most recently OD on Aspirin, most recently admitted at [facility], medically has HTN ws BIB/EMS due to OD on Klonopin .    01/22/2025: Patient seen on inpatient unit today. Patient remains off 1:1 with no current issues. Patient still reports feeling "sad and anxious" however during the interview patient was smiling when appropriate. Patient reports feeling "better today" after having been admitted and receiving [id number] PO for her second dose. Discussed with patient the possibility to increase the Paxil to 20 mg PO starting on 1/23/2025. Patient is in agreement to this medication increase stating "that's what I was on before I stopped". Patient reports being engaged in the community and has been reported as visible by staff. Patient also asked about prior restlessness that she had stated was a concern before. Patient currently reports that the restlessness has decreased substantially since yesterday. At this time patient reports wanting to go home but knows she needs to stay and get better. Patient currently denies SIIP, HIIP, A/V hallucinations, and delusions at this time.    Plan:    1: Continue with inpatient admission at this time.    2: Increase Paxil to 20 mg PO daily as discussed and agreed to by patient.

## 2025-01-22 NOTE — BH INPATIENT PSYCHIATRY PROGRESS NOTE - CURRENT MEDICATION
MEDICATIONS  (STANDING):  carvedilol 25 milliGRAM(s) Oral every 12 hours  losartan 100 milliGRAM(s) Oral daily  PARoxetine 10 milliGRAM(s) Oral daily    MEDICATIONS  (PRN):  acetaminophen     Tablet .. 1000 milliGRAM(s) Oral every 6 hours PRN Temp greater or equal to 38C (100.4F), Mild Pain (1 - 3)  LORazepam     Tablet 0.5 milliGRAM(s) Oral every 12 hours PRN Anxiety   MEDICATIONS  (STANDING):  carvedilol 25 milliGRAM(s) Oral every 12 hours  losartan 100 milliGRAM(s) Oral daily    MEDICATIONS  (PRN):  acetaminophen     Tablet .. 1000 milliGRAM(s) Oral every 6 hours PRN Temp greater or equal to 38C (100.4F), Mild Pain (1 - 3)  LORazepam     Tablet 0.5 milliGRAM(s) Oral every 12 hours PRN Anxiety

## 2025-01-22 NOTE — BH INPATIENT PSYCHIATRY PROGRESS NOTE - NSBHFUPINTERVALHXFT_PSY_A_CORE
Patient a 65 y/o  retired female, domiciled with her , 2 adult children, past Psychiatric Hx of MDD for many years, hx of prior SA, most recently OD on Aspirin, most recently admitted at [facility], medically has HTN ws BIB/EMS due to OD on Klonopin .    01/22/2025: Patient seen on inpatient unit today. Patient remains off 1:1 with no current issues. Patient still reports feeling "sad and anxious" however during the interview patient was smiling when appropriate. Patient reports feeling "better today" after having been admitted and receiving [id number] PO for her second dose. Discussed with patient the possibility to increase the Paxil to 20mg PO starting on 1/23/2025. Patient is in agreement to this medication increase stating "that's what I was on before I stopped". Patient reports being engaged in the community and has been reported as visible by staff. Patient also asked about prior restlessness that she had stated was a concern before. Patient currently reports that the restlessness has decreased substantially since yesterday. At this time patient reports wanting to go home but knows she needs to stay and get better. Patient currently denies SIIP, HIIP, A/V hallucinations, and delusions at this time.     01/21/2025: Patient was seen today with NP Dioni. She is off 1:1 started overnight for safety. She is eager to start Paxil 10 mg x 1 dose to which she responded very well in the past. She is unsure why she took-ok off the Paxil as she was doing very well. She denied current S/H/I/P. discussed the current treatment and plans to re-start Paxil. She c/o restlessness and advised to stay away from Abilify which may cause restlessness.    Patient in bed AAOx3, looks drowsy and in slow voice she endorsed that she spoke with Dr. Urrutia her Psychiatrist today AM and added that " She does not feel good" and added that she feels anxious, and not able to sit right or stay in one position, Dr. Urrutia did not say anything, but added that she has a planned visit on Wednesday and the next moment she OD on prescribed Klonopin 0.5 mg and swallowed more than 20 pills and was brought to ED at .  Patient most recently OD on aspirin, unsure how many she took and was admitted at  [facility] for 1-2 weeks and later was admitted at [facility]. for  week. She added that she did very well on Paxil 20 mg daily for 20+ years, and when she was doing well on Paxil, she decided on her own to stop the Paxil and since then for past 2 years things are not the same, she is not on the right dosage of meds, has been struggling with Depression to have the correct dosage of meds for stability. While at [place] she was on Paxil trial up-to 30 mg daily, and later was switched to Lexapro 15 mg daily and was discharged. Writer also spoke with Renan Barnes and he added that he is in the process of changing her meds and plans to re-start Paxil without any anti-depressants at this time. No A/V/H reported, no overt paranoia noted  Medically has HTN controlled with Carvedilol 12, 5 mf Q-12  Cozaar 50 mg daily   Patient a 63 y/o  retired female, domiciled with her , 2 adult children, past Psychiatric Hx of MDD for many years, hx of prior SA, most recently OD on Aspirin, recently admitted at [facility], medically has HTN ws BIB/EMS following OD on Klonopin.    01/22/2025: Patient seen on inpatient unit today. Patient remains off 1:1 with no current issues. Patient still reports feeling "sad and anxious" however during the interview patient was smiling when appropriate. Patient reports feeling "better today" after having been admitted and receiving Paxil10 mg PO for her second dose. Discussed with patient the possibility to increase the Paxil to 20 mg PO starting on 1/23/2025. Patient is in agreement to this medication increase stating "that's what I was on before I stopped". Patient reports being engaged in the community and has been reported as visible by staff. Patient also asked about prior restlessness that she had stated was a concern before. Patient currently reports that the restlessness has decreased substantially since yesterday. At this time patient reports wanting to go home but knows she needs to stay and get better. Patient currently denies SIIP, HIIP, A/V hallucinations, and delusions at this time.     01/21/2025: Patient was seen today with DELGADO Wheatley. She is off 1:1 started overnight for safety. She is eager to start Paxil 10 mg x 1 dose to which she responded very well in the past. She is unsure why she took-ok off the Paxil as she was doing very well. She denied current S/H/I/P. discussed the current treatment and plans to re-start Paxil. She c/o restlessness and advised to stay away from Abiliy which may cause restlessness.    Patient in bed AAOx3, looks drowsy and in slow voice she endorsed that she spoke with Dr. Urrutia her Psychiatrist today AM and added that " She does not feel good" and added that she feels anxious, and not able to sit right or stay in one position, Dr. Urrutia did not say anything, but added that she has a planned visit on Wednesday and the next moment she OD on prescribed Klonopin 0.5 mg and swallowed more than 20 pills and was brought to ED at .  Patient most recently OD on aspirin, unsure how many she took and was admitted at  [facility] for 1-2 weeks and later was admitted at [facility]. for  week. She added that she did very well on Paxil 20 mg daily for 20+ years, and when she was doing well on Paxil, she decided on her own to stop the Paxil and since then for past 2 years things are not the same, she is not on the right dosage of meds, has been struggling with Depression to have the correct dosage of meds for stability. While at [place] she was on Paxil trial up-to 30 mg daily, and later was switched to Lexapro 15 mg daily and was discharged. Writer also spoke with Renan Barnes and he added that he is in the process of changing her meds and plans to re-start Paxil without any anti-depressants at this time. No A/V/H reported, no overt paranoia noted  Medically has HTN controlled with Carvedilol 12, 5 mf Q-12  Cozaar 50 mg daily

## 2025-01-23 PROCEDURE — 99232 SBSQ HOSP IP/OBS MODERATE 35: CPT

## 2025-01-23 RX ADMIN — PAROXETINE HYDROCHLORIDE 20 MILLIGRAM(S): 10 TABLET, FILM COATED ORAL at 09:01

## 2025-01-23 RX ADMIN — Medication 0.25 MILLIGRAM(S): at 09:01

## 2025-01-23 RX ADMIN — Medication 25 MILLIGRAM(S): at 09:01

## 2025-01-23 RX ADMIN — Medication 25 MILLIGRAM(S): at 21:25

## 2025-01-23 RX ADMIN — Medication 100 MILLIGRAM(S): at 09:01

## 2025-01-23 NOTE — BH INPATIENT PSYCHIATRY PROGRESS NOTE - CURRENT MEDICATION
MEDICATIONS  (STANDING):  carvedilol 25 milliGRAM(s) Oral every 12 hours  clonazePAM Oral Disintegrating Tablet 0.25 milliGRAM(s) Oral daily  losartan 100 milliGRAM(s) Oral daily  PARoxetine 20 milliGRAM(s) Oral daily    MEDICATIONS  (PRN):  acetaminophen     Tablet .. 1000 milliGRAM(s) Oral every 6 hours PRN Temp greater or equal to 38C (100.4F), Mild Pain (1 - 3)  LORazepam     Tablet 0.5 milliGRAM(s) Oral every 12 hours PRN Anxiety

## 2025-01-23 NOTE — BH INPATIENT PSYCHIATRY PROGRESS NOTE - NSBHFUPINTERVALHXFT_PSY_A_CORE
Patient a 65 y/o  retired female, domiciled with her , 2 adult children, past Psychiatric Hx of MDD for many years, hx of prior SA, most recently OD on Aspirin and admitted at [facility], medically has HTN ws BIB/EMS following OD on Klonopin.    01/23/2025: Patient seen in day room today AM, she smiled on approach, endorses that she was not able to sleep last night due to unit disturbances, but seems less anxious or restlessness due to stopping Abilify at this time, which was the reason for the restlessness. She was also re-started on lower dose of Klonopin  0.25 mg daily for now and later to increase as needed. Writer also spoke with her  who endorses that she has rt. hand tremors, which was not evident today will observe for now. No meds changes at this time.    01/22/2025: Patient seen on inpatient unit today. Patient remains off 1:1 with no current issues. Patient still reports feeling "sad and anxious" however during the interview patient was smiling when appropriate. Patient reports feeling "better today" after having been admitted and receiving Paxil 10 mg PO for her second dose. Discussed with patient the possibility to increase the Paxil to 20 mg PO starting on 1/23/2025. Patient is in agreement to this medication increase stating "that's what I was on before I stopped". Patient reports being engaged in the community and has been reported as visible by staff. Patient also asked about prior restlessness that she had stated was a concern before. Patient currently reports that the restlessness has decreased substantially since yesterday. At this time patient reports wanting to go home but knows she needs to stay and get better. Patient currently denies SIIP, HIIP, A/V hallucinations, and delusions at this time.     01/21/2025: Patient was seen today with DELGADO Wheatley. She is off 1:1 started overnight for safety. She is eager to start Paxil 10 mg x 1 dose to which she responded very well in the past. She is unsure why she took-ok off the Paxil as she was doing very well. She denied current S/H/I/P. discussed the current treatment and plans to re-start Paxil. She c/o restlessness and advised to stay away from Abilify which may cause restlessness.    Patient in bed AAOx3, looks drowsy and in slow voice she endorsed that she spoke with Dr. Urrutia her Psychiatrist today AM and added that " She does not feel good" and added that she feels anxious, and not able to sit right or stay in one position, Dr. Urrutia did not say anything, but added that she has a planned visit on Wednesday and the next moment she OD on prescribed Klonopin 0.5 mg and swallowed more than 20 pills and was brought to ED at .  Patient most recently OD on aspirin, unsure how many she took and was admitted at  [facility] for 1-2 weeks and later was admitted at [facility]. for  week. She added that she did very well on Paxil 20 mg daily for 20+ years, and when she was doing well on Paxil, she decided on her own to stop the Paxil and since then for past 2 years things are not the same, she is not on the right dosage of meds, has been struggling with Depression to have the correct dosage of meds for stability. While at [place] she was on Paxil trial up-to 30 mg daily, and later was switched to Lexapro 15 mg daily and was discharged. Writer also spoke with Renan Barnes and he added that he is in the process of changing her meds and plans to re-start Paxil without any anti-depressants at this time. No A/V/H reported, no overt paranoia noted  Medically has HTN controlled with Carvedilol 12, 5 mf Q-12  Cozaar 50 mg daily

## 2025-01-23 NOTE — BH INPATIENT PSYCHIATRY PROGRESS NOTE - NSBHASSESSSUMMFT_PSY_ALL_CORE
Patient a 63 y/o  retired female, domiciled with her , 2 adult children, past Psychiatric Hx of MDD for many years, hx of prior SA, most recently OD on Aspirin, most recently admitted at [facility], medically has HTN ws BIB/EMS due to OD on Klonopin .    01/22/2025: Patient seen on inpatient unit today. Patient remains off 1:1 with no current issues. Patient still reports feeling "sad and anxious" however during the interview patient was smiling when appropriate. Patient reports feeling "better today" after having been admitted and receiving [id number] PO for her second dose. Discussed with patient the possibility to increase the Paxil to 20 mg PO starting on 1/23/2025. Patient is in agreement to this medication increase stating "that's what I was on before I stopped". Patient reports being engaged in the community and has been reported as visible by staff. Patient also asked about prior restlessness that she had stated was a concern before. Patient currently reports that the restlessness has decreased substantially since yesterday. At this time patient reports wanting to go home but knows she needs to stay and get better. Patient currently denies SIIP, HIIP, A/V hallucinations, and delusions at this time.    Plan:    1: Continue with inpatient admission at this time.    2: Increase Paxil to 20 mg PO daily as discussed and agreed to by patient.

## 2025-01-24 PROCEDURE — 99232 SBSQ HOSP IP/OBS MODERATE 35: CPT

## 2025-01-24 RX ORDER — ACETAMINOPHEN, DIPHENHYDRAMINE HCL, PHENYLEPHRINE HCL 325; 25; 5 MG/1; MG/1; MG/1
3 TABLET ORAL AT BEDTIME
Refills: 0 | Status: DISCONTINUED | OUTPATIENT
Start: 2025-01-24 | End: 2025-01-29

## 2025-01-24 RX ADMIN — Medication 25 MILLIGRAM(S): at 09:18

## 2025-01-24 RX ADMIN — Medication 0.25 MILLIGRAM(S): at 09:17

## 2025-01-24 RX ADMIN — Medication 100 MILLIGRAM(S): at 09:16

## 2025-01-24 RX ADMIN — PAROXETINE HYDROCHLORIDE 20 MILLIGRAM(S): 10 TABLET, FILM COATED ORAL at 09:16

## 2025-01-24 RX ADMIN — ACETAMINOPHEN, DIPHENHYDRAMINE HCL, PHENYLEPHRINE HCL 3 MILLIGRAM(S): 325; 25; 5 TABLET ORAL at 20:36

## 2025-01-24 RX ADMIN — Medication 25 MILLIGRAM(S): at 20:36

## 2025-01-24 NOTE — BH INPATIENT PSYCHIATRY PROGRESS NOTE - NSBHASSESSSUMMFT_PSY_ALL_CORE
Patient a 65 y/o  retired female, domiciled with her , 2 adult children, past Psychiatric Hx of MDD for many years, hx of prior SA, most recently OD on Aspirin, most recently admitted at [facility], medically has HTN ws BIB/EMS due to OD on Klonopin .    Patient a 65 y/o  retired female, domiciled with her , 2 adult children, past Psychiatric Hx of MDD for many years, hx of prior SA, most recently OD on Aspirin and admitted at [facility], medically has HTN ws BIB/EMS following OD on Klonopin.    01/24/2025: Patient seen in day room, smiles on approach and while smiling asking for her discharge date. She endorses that she is 80 % better and has been sleeping relatively well, taking her meds, participates unit activities and endorses that she is back on her track within 48-72 hours since adding the Paxil 20mg daily. She was asked, about adding a 2nd anti-depressants for improved mood/affect and overall stability/safety and prevention future OD. She has no restlessness and was advised that it was attributed to the Abilify which has been associated with Akathisia/restlessness. She is on Klonopin 0.25 mg daily.      01/23/2025: Patient seen in day room today AM, she smiled on approach, endorses that she was not able to sleep last night due to unit disturbances, but seems less anxious or restlessness due to stopping Abilify at this time, which was the reason for the restlessness. She was also re-started on lower dose of Klonopin  0.25 mg daily for now and later to increase as needed. Writer also spoke with her  who endorses that she has rt. hand tremors, which was not evident today will observe for now. No meds changes at this time.    01/22/2025: Patient seen on inpatient unit today. Patient remains off 1:1 with no current issues. Patient still reports feeling "sad and anxious" however during the interview patient was smiling when appropriate. Patient reports feeling "better today" after having been admitted and receiving Paxil 10 mg PO for her second dose. Discussed with patient the possibility to increase the Paxil to 20 mg PO starting on 1/23/2025. Patient is in agreement to this medication increase stating "that's what I was on before I stopped". Patient reports being engaged in the community and has been reported as visible by staff. Patient also asked about prior restlessness that she had stated was a concern before. Patient currently reports that the restlessness has decreased substantially since yesterday. At this time patient reports wanting to go home but knows she needs to stay and get better. Patient currently denies SIIP, HIIP, A/V hallucinations, and delusions at this time.     01/21/2025: Patient was seen today with DELGADO Wheatley. She is off 1:1 started overnight for safety. She is eager to start Paxil 10 mg x 1 dose to which she responded very well in the past. She is unsure why she took-ok off the Paxil as she was doing very well. She denied current S/H/I/P. discussed the current treatment and plans to re-start Paxil. She c/o restlessness and advised to stay away from Abili which may cause restlessness.          01/22/2025: Patient seen on inpatient unit today. Patient remains off 1:1 with no current issues. Patient still reports feeling "sad and anxious" however during the interview patient was smiling when appropriate. Patient reports feeling "better today" after having been admitted and receiving [id number] PO for her second dose. Discussed with patient the possibility to increase the Paxil to 20 mg PO starting on 1/23/2025. Patient is in agreement to this medication increase stating "that's what I was on before I stopped". Patient reports being engaged in the community and has been reported as visible by staff. Patient also asked about prior restlessness that she had stated was a concern before. Patient currently reports that the restlessness has decreased substantially since yesterday. At this time patient reports wanting to go home but knows she needs to stay and get better. Patient currently denies SIIP, HIIP, A/V hallucinations, and delusions at this time.    Plan:    1: Continue with inpatient admission at this time.    2: Increase Paxil to 20 mg PO daily as discussed and agreed to by patient.

## 2025-01-24 NOTE — BH INPATIENT PSYCHIATRY PROGRESS NOTE - NSBHFUPINTERVALHXFT_PSY_A_CORE
Patient a 65 y/o  retired female, domiciled with her , 2 adult children, past Psychiatric Hx of MDD for many years, hx of prior SA, most recently OD on Aspirin and admitted at [facility], medically has HTN ws BIB/EMS following OD on Klonopin.    01/24/2025: Patient seen in day room, smiles on approach and while smiling asking for her discharge date. She endorses that she is 80 % better and has been sleeping relatively well, taking her meds, participates unit activities and endorses that she is back on her track within 48-72 hours since adding the Paxil 20mg daily. She was asked, about adding a 2nd anti-depressants for improved mood/affect and overall stability/safety and prevention future OD. She has no restlessness and was advised that it was attributed to the Abilify which has been associated with Akathisia/restlessness. She is on Klonopin 0.25 mg daily.      01/23/2025: Patient seen in day room today AM, she smiled on approach, endorses that she was not able to sleep last night due to unit disturbances, but seems less anxious or restlessness due to stopping Abilify at this time, which was the reason for the restlessness. She was also re-started on lower dose of Klonopin  0.25 mg daily for now and later to increase as needed. Writer also spoke with her  who endorses that she has rt. hand tremors, which was not evident today will observe for now. No meds changes at this time.    01/22/2025: Patient seen on inpatient unit today. Patient remains off 1:1 with no current issues. Patient still reports feeling "sad and anxious" however during the interview patient was smiling when appropriate. Patient reports feeling "better today" after having been admitted and receiving Paxil 10 mg PO for her second dose. Discussed with patient the possibility to increase the Paxil to 20 mg PO starting on 1/23/2025. Patient is in agreement to this medication increase stating "that's what I was on before I stopped". Patient reports being engaged in the community and has been reported as visible by staff. Patient also asked about prior restlessness that she had stated was a concern before. Patient currently reports that the restlessness has decreased substantially since yesterday. At this time patient reports wanting to go home but knows she needs to stay and get better. Patient currently denies SIIP, HIIP, A/V hallucinations, and delusions at this time.     01/21/2025: Patient was seen today with DELGADO Wheatley. She is off 1:1 started overnight for safety. She is eager to start Paxil 10 mg x 1 dose to which she responded very well in the past. She is unsure why she took-ok off the Paxil as she was doing very well. She denied current S/H/I/P. discussed the current treatment and plans to re-start Paxil. She c/o restlessness and advised to stay away from Abilify which may cause restlessness.    Patient in bed AAOx3, looks drowsy and in slow voice she endorsed that she spoke with Dr. Urrutia her Psychiatrist today AM and added that " She does not feel good" and added that she feels anxious, and not able to sit right or stay in one position, Dr. Urrutia did not say anything, but added that she has a planned visit on Wednesday and the next moment she OD on prescribed Klonopin 0.5 mg and swallowed more than 20 pills and was brought to ED at .  Patient most recently OD on aspirin, unsure how many she took and was admitted at  [facility] for 1-2 weeks and later was admitted at [facility]. for  week. She added that she did very well on Paxil 20 mg daily for 20+ years, and when she was doing well on Paxil, she decided on her own to stop the Paxil and since then for past 2 years things are not the same, she is not on the right dosage of meds, has been struggling with Depression to have the correct dosage of meds for stability. While at [place] she was on Paxil trial up-to 30 mg daily, and later was switched to Lexapro 15 mg daily and was discharged. Writer also spoke with Renan Barnes and he added that he is in the process of changing her meds and plans to re-start Paxil without any anti-depressants at this time. No A/V/H reported, no overt paranoia noted  Medically has HTN controlled with Carvedilol 12, 5 mf Q-12  Cozaar 50 mg daily

## 2025-01-24 NOTE — BH INPATIENT PSYCHIATRY PROGRESS NOTE - NSBHMSETHTCONTENT_PSY_A_CORE
Marialuisa, please contact this patient to schedule a laparoscopic bilateral salpingectomy at her convenience. Hopelessness/Suicidality

## 2025-01-24 NOTE — BH INPATIENT PSYCHIATRY PROGRESS NOTE - CURRENT MEDICATION
MEDICATIONS  (STANDING):  carvedilol 25 milliGRAM(s) Oral every 12 hours  clonazePAM Oral Disintegrating Tablet 0.25 milliGRAM(s) Oral daily  losartan 100 milliGRAM(s) Oral daily  PARoxetine 20 milliGRAM(s) Oral daily    MEDICATIONS  (PRN):  acetaminophen     Tablet .. 1000 milliGRAM(s) Oral every 6 hours PRN Temp greater or equal to 38C (100.4F), Mild Pain (1 - 3)  LORazepam     Tablet 0.5 milliGRAM(s) Oral every 12 hours PRN Anxiety  melatonin 3 milliGRAM(s) Oral at bedtime PRN Insomnia

## 2025-01-25 RX ADMIN — Medication 25 MILLIGRAM(S): at 09:47

## 2025-01-25 RX ADMIN — Medication 100 MILLIGRAM(S): at 09:47

## 2025-01-25 RX ADMIN — ACETAMINOPHEN, DIPHENHYDRAMINE HCL, PHENYLEPHRINE HCL 3 MILLIGRAM(S): 325; 25; 5 TABLET ORAL at 20:28

## 2025-01-25 RX ADMIN — Medication 25 MILLIGRAM(S): at 20:28

## 2025-01-25 RX ADMIN — PAROXETINE HYDROCHLORIDE 20 MILLIGRAM(S): 10 TABLET, FILM COATED ORAL at 09:47

## 2025-01-25 RX ADMIN — Medication 0.25 MILLIGRAM(S): at 09:47

## 2025-01-26 RX ADMIN — PAROXETINE HYDROCHLORIDE 20 MILLIGRAM(S): 10 TABLET, FILM COATED ORAL at 09:02

## 2025-01-26 RX ADMIN — ACETAMINOPHEN, DIPHENHYDRAMINE HCL, PHENYLEPHRINE HCL 3 MILLIGRAM(S): 325; 25; 5 TABLET ORAL at 20:16

## 2025-01-26 RX ADMIN — Medication 0.25 MILLIGRAM(S): at 09:01

## 2025-01-26 RX ADMIN — Medication 25 MILLIGRAM(S): at 20:16

## 2025-01-26 RX ADMIN — Medication 100 MILLIGRAM(S): at 09:02

## 2025-01-26 RX ADMIN — Medication 25 MILLIGRAM(S): at 09:01

## 2025-01-27 PROCEDURE — 99232 SBSQ HOSP IP/OBS MODERATE 35: CPT

## 2025-01-27 RX ADMIN — Medication 25 MILLIGRAM(S): at 09:18

## 2025-01-27 RX ADMIN — Medication 25 MILLIGRAM(S): at 20:20

## 2025-01-27 RX ADMIN — ACETAMINOPHEN, DIPHENHYDRAMINE HCL, PHENYLEPHRINE HCL 3 MILLIGRAM(S): 325; 25; 5 TABLET ORAL at 20:23

## 2025-01-27 RX ADMIN — Medication 0.25 MILLIGRAM(S): at 09:18

## 2025-01-27 RX ADMIN — Medication 100 MILLIGRAM(S): at 09:19

## 2025-01-27 RX ADMIN — PAROXETINE HYDROCHLORIDE 20 MILLIGRAM(S): 10 TABLET, FILM COATED ORAL at 09:18

## 2025-01-27 NOTE — BH INPATIENT PSYCHIATRY PROGRESS NOTE - NSBHFUPINTERVALHXFT_PSY_A_CORE
Patient a 63 y/o  retired female, domiciled with her , 2 adult children, past Psychiatric Hx of MDD for many years, hx of prior SA, most recently OD on Aspirin and admitted at [facility], medically has HTN ws BIB/EMS following OD on Klonopin.    1/27/2025: Patient seen in the day room today. Patient reports "feeling good" at this time. Patient smiling on and off throughout interview when appropriate. Patient at this time is both future and goal oriented. Patient discussing plan on returning home and realistic expectations on continuing treatment after discharge. Patient reports feelings of regret about her most recent SA. Patient discussed different coping mechanisms that she intends to use if those thoughts arise. Patient also discussed calling either a friend or crisis hotline if needed. Discussed with the patient on the importance of returning to  [facility] if needed. Patient stated she would if she needed. Patient currently denies depression, SIIP, HIIP, A/V hallucinations, or delusions at this time.    01/24/2025: Patient seen in day room, smiles on approach and while smiling asking for her discharge date. She endorses that she is 80 % better and has been sleeping relatively well, taking her meds, participates unit activities and endorses that she is back on her track within 48-72 hours since adding the Paxil 20mg daily. She was asked, about adding a 2nd anti-depressants for improved mood/affect and overall stability/safety and prevention future OD. She has no restlessness and was advised that it was attributed to the Abilify which has been associated with Akathisia/restlessness. She is on Klonopin 0.25 mg daily.      01/23/2025: Patient seen in day room today AM, she smiled on approach, endorses that she was not able to sleep last night due to unit disturbances, but seems less anxious or restlessness due to stopping Abilify at this time, which was the reason for the restlessness. She was also re-started on lower dose of Klonopin  0.25 mg daily for now and later to increase as needed. Writer also spoke with her  who endorses that she has rt. hand tremors, which was not evident today will observe for now. No meds changes at this time.

## 2025-01-27 NOTE — BH INPATIENT PSYCHIATRY PROGRESS NOTE - NSBHMSESPABN_PSY_A_CORE
Slowed rate/Decreased productivity
Slowed rate

## 2025-01-27 NOTE — BH INPATIENT PSYCHIATRY PROGRESS NOTE - NSBHASSESSSUMMFT_PSY_ALL_CORE
Patient a 63 y/o  retired female, domiciled with her , 2 adult children, past Psychiatric Hx of MDD for many years, hx of prior SA, most recently OD on Aspirin and admitted at , medically has HTN ws BIB/EMS following OD on Klonopin.    1/27/2025: Patient seen in the day room today. Patient reports "feeling good" at this time. Patient smiling on and off throughout interview when appropriate. Patient at this time is both future and goal oriented. Patient discussing plan on returning home and realistic expectations on continuing treatment after discharge. Patient reports feelings of regret about her most recent SA. Patient discussed different coping mechanisms that she intends to use if those thoughts arise. Patient also discussed calling either a friend or crisis hotline if needed. Discussed with the patient on the importance of returning to  ER if needed. Patient stated she would if she needed. Patient currently denies depression, SIIP, HIIP, A/V hallucinations, or delusions at this time.    Plan:    1: Continue with inpatient admission at this time.    2: Continue medication as prescribed at this time.    3: Discuss with patient and social work the possibility of discharge this week.

## 2025-01-28 PROCEDURE — 99232 SBSQ HOSP IP/OBS MODERATE 35: CPT

## 2025-01-28 RX ORDER — LOSARTAN POTASSIUM 100 MG
1 TABLET ORAL
Qty: 30 | Refills: 0
Start: 2025-01-28 | End: 2025-02-26

## 2025-01-28 RX ORDER — HYDROCHLOROTHIAZIDE 50 MG
1 TABLET ORAL
Refills: 0 | DISCHARGE

## 2025-01-28 RX ORDER — DIPHENHYDRAMINE HCL 25 MG
1 CAPSULE ORAL
Refills: 0 | DISCHARGE

## 2025-01-28 RX ORDER — CLONAZEPAM 2 MG
1 TABLET ORAL
Qty: 14 | Refills: 0
Start: 2025-01-28 | End: 2025-02-10

## 2025-01-28 RX ORDER — LOSARTAN POTASSIUM 100 MG
1 TABLET ORAL
Refills: 0 | DISCHARGE

## 2025-01-28 RX ORDER — CARVEDILOL 6.25 MG
1 TABLET ORAL
Qty: 60 | Refills: 0
Start: 2025-01-28 | End: 2025-02-26

## 2025-01-28 RX ORDER — PAROXETINE HYDROCHLORIDE 10 MG/1
1 TABLET, FILM COATED ORAL
Qty: 14 | Refills: 0
Start: 2025-01-28 | End: 2025-02-10

## 2025-01-28 RX ORDER — ARIPIPRAZOLE 5 MG/1
1 TABLET ORAL
Refills: 0 | DISCHARGE

## 2025-01-28 RX ORDER — CARVEDILOL 6.25 MG
1 TABLET ORAL
Refills: 0 | DISCHARGE

## 2025-01-28 RX ORDER — CLONAZEPAM 2 MG
1 TABLET ORAL
Refills: 0 | DISCHARGE

## 2025-01-28 RX ADMIN — Medication 25 MILLIGRAM(S): at 09:25

## 2025-01-28 RX ADMIN — Medication 25 MILLIGRAM(S): at 20:36

## 2025-01-28 RX ADMIN — Medication 100 MILLIGRAM(S): at 09:25

## 2025-01-28 RX ADMIN — Medication 0.25 MILLIGRAM(S): at 09:25

## 2025-01-28 RX ADMIN — PAROXETINE HYDROCHLORIDE 20 MILLIGRAM(S): 10 TABLET, FILM COATED ORAL at 09:25

## 2025-01-28 NOTE — BH INPATIENT PSYCHIATRY DISCHARGE NOTE - NSDCMRMEDTOKEN_GEN_ALL_CORE_FT
carvedilol 25 mg oral tablet: 1 tab(s) orally every 12 hours  clonazePAM 0.25 mg oral tablet, disintegratin tab(s) orally once a day MDD: 0.25  losartan 100 mg oral tablet: 1 tab(s) orally once a day  Paxil 20 mg oral tablet: 1 tab(s) orally once a day

## 2025-01-28 NOTE — BH INPATIENT PSYCHIATRY DISCHARGE NOTE - HPI (INCLUDE ILLNESS QUALITY, SEVERITY, DURATION, TIMING, CONTEXT, MODIFYING FACTORS, ASSOCIATED SIGNS AND SYMPTOMS)
01/21/2025: Patient was seen today with DELGADO Wheatley. She is off 1:1 started overnight for safety. She is eager to start Paxil 10 mg x 1 dose to which she responded very well in the past. She is unsure why she took-ok off the Paxil as she was doing very well. She denied current S/H/I/P. discussed the current treatment and plans to re-start Paxil. She c/o restlessness and advised to stay away from Abilify which may cause restlessness.    Patient a 63 y/o  retired female, domiciled with her , 2 adult children, past Psychiatric Hx of MDD for many years, hx of prior SA, most recently OD on Aspirin, most recently admitted at [facility], medically has HTN ws BIB/EMS due to above reasons.    Patient in bed AAOx3, looks drowsy and in slow voice she endorsed that she spoke with Dr. Urrutia her Psychiatrist today AM and added that " She does not feel good" and added that she feels anxious, and not able to sit right or stay in one position, Dr. Urrutia did not say anything, but added that she has a planned visit on Wednesday and the next moment she OD on prescribed Klonopin 0.5 mg and swallowed more than 20 pills and was brought to ED at .  Patient most recently OD on aspirin, unsure how many she took and was admitted at  [facility] for 1-2 weeks and later was admitted at [facility]. for  week. She added that she did very well on Paxil 20 mg daily for 20+ years, and when she was doing well on Paxil, she decided on her own to stop the Paxil and since then for past 2 years things are not the same, she is not on the right dosage of meds, has been struggling with Depression to have the correct dosage of meds for stability. While at [place] she was on Paxil trial up-to 30 mg daily, and later was switched to Lexapro 15 mg daily and was discharged. Writer also spoke with Dr. Urrutia and he added that he is in the process of changing her meds and plans to re-start Paxil without any anti-depressants at this time. No A/V/H reported, no overt paranoia noted  Medically has HTN controlled with Carvedilol 12. 5 mg Q-12  Cozaar 50 mg daily

## 2025-01-28 NOTE — BH INPATIENT PSYCHIATRY DISCHARGE NOTE - NSBHMETABOLIC_PSY_ALL_CORE_FT
BMI: BMI (kg/m2): 31.1 (01-20-25 @ 22:13)  HbA1c: A1C with Estimated Average Glucose Result: 5.7 % (01-21-25 @ 08:36)    Glucose:   BP: --Vital Signs Last 24 Hrs  T(C): 36.7 (01-28-25 @ 07:37), Max: 36.8 (01-27-25 @ 19:54)  T(F): 98 (01-28-25 @ 07:37), Max: 98.3 (01-27-25 @ 19:54)  HR: --  BP: --  BP(mean): --  RR: 16 (01-28-25 @ 07:37) (16 - 16)  SpO2: 100% (01-28-25 @ 07:37) (95% - 100%)    Orthostatic VS  01-28-25 @ 07:37  Lying BP: --/-- HR: --  Sitting BP: 132/80 HR: 79  Standing BP: 123/84 HR: 86  Site: upper right arm  Mode: electronic  Orthostatic VS  01-27-25 @ 19:54  Lying BP: --/-- HR: --  Sitting BP: 120/72 HR: 72  Standing BP: --/-- HR: --  Site: upper right arm  Mode: electronic  Orthostatic VS  01-27-25 @ 07:43  Lying BP: --/-- HR: --  Sitting BP: 147/82 HR: 75  Standing BP: 143/86 HR: 79  Site: upper right arm  Mode: electronic    Lipid Panel: Date/Time: 01-21-25 @ 08:36  Cholesterol, Serum: 159  LDL Cholesterol Calculated: 79  HDL Cholesterol, Serum: 44  Total Cholesterol/HDL Ration Measurement: --  Triglycerides, Serum: 212

## 2025-01-28 NOTE — BH DISCHARGE NOTE NURSING/SOCIAL WORK/PSYCH REHAB - NSDCPRRECOMMEND_PSY_ALL_CORE
Pt is encouraged to utilize learned coping skills, refer back to her safety plan, and stay connected to outpatient treatment for support in the community.

## 2025-01-28 NOTE — BH INPATIENT PSYCHIATRY DISCHARGE NOTE - NSDCPROCEDURESFT_PSY_ALL_CORE
CBC-WNL  CMP-WNL  TSH-1.79  HBA1-C-5.7    Lipid Profile    LDL-79  HDL-44  TRG-212    [id number]  Covid-19-Negative

## 2025-01-28 NOTE — BH TREATMENT PLAN - NSTXDCOTHRGOAL_PSY_ALL_CORE
Pt. will have appt. for ongoing mental health care within 5-7 days of discharge.   Pt. will have safe housing upon discharge.   Pt. is insured and benefits are in place.   SW to explore w/ pt. the interest and need for substance use referral for discharge.
Pt. will have appt. for ongoing mental health care within 5-7 days of discharge.   Pt. will have safe housing upon discharge.   Pt. is insured and benefits are in place.   SW to explore w/ pt. the interest and need for substance use referral for discharge.

## 2025-01-28 NOTE — BH INPATIENT PSYCHIATRY DISCHARGE NOTE - NSSUICRSKFACTOR_PSY_ALL_CORE
Current and Past Psychiatric Diagnoses
GOAL: Pt will ambulate 100 ft (I) w/use of appropriate assistive device in 4 weeks

## 2025-01-28 NOTE — BH INPATIENT PSYCHIATRY DISCHARGE NOTE - NSBHASSESSSUMMFT_PSY_ALL_CORE
Patient a 65 y/o  retired female, domiciled with her , 2 adult children, past Psychiatric Hx of MDD for many years, hx of prior SA, most recently OD on Aspirin and admitted at [facility], medically has HTN ws BIB/EMS following OD on Klonopin.    1/28/2025: Patient seen today in day room on unit. When asked how she was doing patient replied "I feel a little anxious about discharge" when ask why patient replied "It's a big change." Discussed with patient the normalcy of anxiety prior to being discharge. Patient reports feeling better able to handle this anxiety at this time. Discussed with patient her day of discharge plan that includes going to seen her outpatient psychiatrist after she is discharged from the hospital. Also discussed with the patient different coping mechanisms that she intends to use if similar thoughts arise. Patient reports regret with her actions in reguards to her SA. Patient also discussed calling either a friend or crisis hotline if needed. Discussed with the patient on the importance of returning to  ER if needed. Patient displayed a understanding on these points a reported she would return if she feels the need for help. Patient will also be given a list of services she has access to by social work. Patient also made aware that we will be sending a two weeks supply of her medications home and imparted on her the importance of her follow up appointments. Patient currently denies depressive symptoms, SIIP, HIIP, and A/V hallucinations.    Plan:    1. Continue medication as prescribed at this time.  2. F/U as per  referral  3. In crisis to speak with Therapist/Psychiatrist or may visit ED for assistance

## 2025-01-28 NOTE — BH TREATMENT PLAN - NSTXDCOTHRINTERSW_PSY_ALL_CORE
SW to meet w/ pt. to complete psychososial assessment and for d/c planning.
Case disucssed with tx. team daily. Pt. with anticipated d/c for Wed. 1/29. SW met w/ pt. who appeared with brighter affect and more animated. Pt. reporting some ongoing anxiety but overall much symptom improvement and feel satisfied with medications. Pt expressing eagerness for d/c home and able to report on coping skills if symptoms including SI should return (calling friend, calling psychiatrist, calling crisis line). Discussed aftercare plan again. Pt. reports again plan to return to Dr. Moon at this time but seek alternative female private provider for future. Pt. reports she is comfortable to returning to Dr. Moon at this time and tx. team discussed benefits of pt. returning to provider who knows pt. current needs and will continue to treat. Pt. looking for provider that provides both medication managment and talk therapist services as Dr. Moon. Pt. aware of potential barriers to finding provider directly out of hospital due to risk admitting needs and for d/c from hospitial. Pt. agreed for SW to include additional provider contacts in d/c paperwork including clinics if pt. in need of alterantives. SW and pt. also reviewed idea of pt. engaging in more social activities including joining gym. Pt. expressing thoughts to get a part-time job again to have more activity during halfway. Pt. agereable to idea of gym class and increasing social activities. MD then called Dr. Moon with SW present to provide handoff for d/c planning. Dr. Moon offered follow-up appointment on 1/29 at 11am in-person which is pt.'s usual standing appt. MD confirmed plan to d/c on 1/29 at 9:30am so that pt. can go directly to appt. Per MD, Dr. Moon advised no fax number present and requesting d/c summary provided to pt. to provide to Dr. Moon. JULIANE and MD then called pt.  to update on d/c plan. Pt.  advised of barriers to private provider search for alternative provider and SW relayed outcomes of meeting with pt. and plan. Pt.  expressed understanding and agreement. He advised would be available to transport at 9:30am for d/c and requesting to wait in car. He reports pt. with observed symptom improvements as well. JULIANE later met w/ pt. and pt.'s  and friend during visiting hrs. Pt. advised this was one of friends she plans to utilize for support as needed in community. SW agreed to call pt.  tomorrow to confirm d/c plan. Pt. noting pharm. is [facility] in [place] on [address]. Tx. team updated. SW to continue to follow.

## 2025-01-28 NOTE — BH DISCHARGE NOTE NURSING/SOCIAL WORK/PSYCH REHAB - NSDCPLANREVIEWED_PSY_ALL_CORE
, nurse and MD all reviewed the discharge plan with patient who accepts and agrees with the plan.  Patient provided with a copy of the discharge plan. Patient agrees to receive a copy of the discharge note in the patient portal.  Patient is appropriately dressed for discharge.  Patient will be transported home by family, friend, taxi or uber to ensure a safe arrival. , nurse and MD all reviewed the discharge plan with patient who accepts and agrees with the plan.  Patient provided with a copy of the discharge plan. Patient agrees to receive a copy of the discharge note in the patient portal.  Patient is appropriately dressed for discharge.  Patient will be transported home by family, friend, taxi or uber to ensure a safe arrival./Yes

## 2025-01-28 NOTE — BH DISCHARGE NOTE NURSING/SOCIAL WORK/PSYCH REHAB - NSBHCRISISRESOURCESOTHER_PSY_ALL_CORE_FT
Please contact Dr. Mackenzie for medication or treatment questions, lab results or any other concerns at [phone]. Handoff provided to your outpatient provider, Dr. Moon.  Patient has agreed to receive a copy of their discharge note in the patient portal.  If needed, please contact [facility], [facility], 24/7 days a week and speak with Elina Rodriguez RN Nurse manager or any [facility] staff member.  Please return to the ED if symptoms worsen or return.

## 2025-01-28 NOTE — BH DISCHARGE NOTE NURSING/SOCIAL WORK/PSYCH REHAB - NSDCADDINFO1FT_PSY_ALL_CORE
You have an IN-PERSON follow-up appointment with for medication management and talk therapy scheduled for Wed. 1/29/25 at 11am.   Pt. denies any substance use needs at this time. If any should arise, please discuss in treatment with Dr. Moon.  Abdominal pain General

## 2025-01-28 NOTE — BH DISCHARGE NOTE NURSING/SOCIAL WORK/PSYCH REHAB - PATIENT PORTAL LINK FT
You can access the FollowMyHealth Patient Portal offered by [facility] by registering at the following website: [email or web address]. By joining [employer]’s FollowMyHealth portal, you will also be able to view your health information using other applications (apps) compatible with our system.

## 2025-01-28 NOTE — BH DISCHARGE NOTE NURSING/SOCIAL WORK/PSYCH REHAB - NSDCCRNUMBER_GEN_ALL_CORE_FT
24hr hotline: [phone] [facility]: [phone]/ [facility]: [phone]

## 2025-01-28 NOTE — BH INPATIENT PSYCHIATRY DISCHARGE NOTE - NSDCCPCAREPLAN_GEN_ALL_CORE_FT
PRINCIPAL DISCHARGE DIAGNOSIS  Diagnosis: Severe episode of recurrent major depressive disorder, without psychotic features  Assessment and Plan of Treatment: Paxil 20 mg HS; Klonopin 0.25 mg Daily

## 2025-01-28 NOTE — BH DISCHARGE NOTE NURSING/SOCIAL WORK/PSYCH REHAB - NSDCCRTYPESERV_GEN_ALL_CORE_FT
Crisis mental health and substance use resource including crisis hotline and drop-in center for evaluations, counseling, referrals, and, at times, medications refills  Social activities and additional services for [facility] residents 60+ years old. Call center for registration. *The [facility] program through [facility] offers activities including painting classes and sports/games for all levels.

## 2025-01-28 NOTE — BH TREATMENT PLAN - NSTXDEPRESINTERRN_PSY_ALL_CORE
Establish trust/therapeutic rapport to encourage ventilation of feelings.  Provide emotional support.  Assess mood Q shift, document and report changes.  Encourage medication compliance, provide education, and monitor effects.  Encourage participation in unit activities with emphasis on coping/stress management.
Establish trust/therapeutic rapport to encourage ventilation of feelings.  Provide emotional support.  Assess mood Q shift, document and report changes.  Encourage medication compliance, provide education, and monitor effects.  Encourage participation in unit activities with emphasis on coping/stress management.

## 2025-01-28 NOTE — BH TREATMENT PLAN - NSTXCOPEINTERPR_PSY_ALL_CORE
Pt will attend 1-2 group session per day to increase coping skills
Pt will attend 1-2 group session per day to increase coping skills

## 2025-01-28 NOTE — BH INPATIENT PSYCHIATRY PROGRESS NOTE - CURRENT MEDICATION
MEDICATIONS  (STANDING):  carvedilol 25 milliGRAM(s) Oral every 12 hours  clonazePAM Oral Disintegrating Tablet 0.25 milliGRAM(s) Oral daily  losartan 100 milliGRAM(s) Oral daily  PARoxetine 20 milliGRAM(s) Oral daily    MEDICATIONS  (PRN):  acetaminophen     Tablet .. 1000 milliGRAM(s) Oral every 6 hours PRN Temp greater or equal to 38C (100.4F), Mild Pain (1 - 3)  melatonin 3 milliGRAM(s) Oral at bedtime PRN Insomnia

## 2025-01-28 NOTE — BH INPATIENT PSYCHIATRY PROGRESS NOTE - NSBHFUPINTERVALHXFT_PSY_A_CORE
Patient a 63 y/o  retired female, domiciled with her , 2 adult children, past Psychiatric Hx of MDD for many years, hx of prior SA, most recently OD on Aspirin and admitted at [facility], medically has HTN ws BIB/EMS following OD on Klonopin.    1/28/2025: Patient seen today in day room on unit. When asked how she was doing patient replied "I feel a little anxious about discharge" when ask why patient replied "It's a big change." Discussed with patient the normalcy of anxiety prior to being discharge. Patient reports feeling better able to handle this anxiety at this time. Discussed with patient her day of discharge plan that includes going to seen her outpatient psychiatrist after she is discharged from the hospital. Also discussed with the patient different coping mechanisms that she intends to use if similar thoughts arise. Patient also discussed calling either a friend or crisis hotline if needed. Discussed with the patient on the importance of returning to  ER if needed. Patient displayed a understanding on these points a reported she would return if she feels the need for help. Patient will also be given a list of services she has access to by social work. Patient also made aware that we will be sending a two weeks supply of her medications home and imparted on her the importance of her follow up appointments. Patient currently denies depressive symptoms, SIIP, HIIP, and A/V hallucinations.    1/27/2025: Patient seen in the day room today. Patient reports "feeling good" at this time. Patient smiling on and off throughout interview when appropriate. Patient at this time is both future and goal oriented. Patient discussing plan on returning home and realistic expectations on continuing treatment after discharge. Patient reports feelings of regret about her most recent SA. Patient discussed different coping mechanisms that she intends to use if those thoughts arise. Patient also discussed calling either a friend or crisis hotline if needed. Discussed with the patient on the importance of returning to  ER if needed. Patient stated she would if she needed. Patient currently denies depression, SIIP, HIIP, A/V hallucinations, or delusions at this time.    01/24/2025: Patient seen in day room, smiles on approach and while smiling asking for her discharge date. She endorses that she is 80 % better and has been sleeping relatively well, taking her meds, participates unit activities and endorses that she is back on her track within 48-72 hours since adding the Paxil 20mg daily. She was asked, about adding a 2nd anti-depressants for improved mood/affect and overall stability/safety and prevention future OD. She has no restlessness and was advised that it was attributed to the Abilify which has been associated with Akathisia/restlessness. She is on Klonopin 0.25 mg daily.

## 2025-01-28 NOTE — BH DISCHARGE NOTE NURSING/SOCIAL WORK/PSYCH REHAB - NSDCPRGOAL_PSY_ALL_CORE
Pt worked on developing and improving coping skills and safety planning through participation in unit programming.

## 2025-01-28 NOTE — BH TREATMENT PLAN - NSTXDEPRESINTERMD_PSY_ALL_CORE
01/22/2025: MDD--On Paxil 10 mg daily trial and now increased to Paxil 20 mg trial from 01/23 with Klonopin Wafer 0.25 mg daily from 01/23.  01/28/2025: MDD--Doing well on Paxil 20 mg daily and Klonopin 0.25 mg daily.
01/22/2025: MDD--On Paxil 10 mg daily trial and now increased to Paxil 20 mg trial from 01/23 with Klonopin Wafer 0.25 mg daily from 01/23.

## 2025-01-28 NOTE — BH TREATMENT PLAN - NSTXPLANTHERAPYSESSIONSFT_PSY_ALL_CORE
01-22-25  Type of therapy: Chair Yoga, Art Room  Type of session: Group  Level of patient participation: Participated with encouragement  Duration of participation: 60 minutes  Therapy conducted by: Psych rehab  Therapy Summary: Pt attended chair yoga and art therapy. She stated she was feeling a little better and states she has remorse about taking an overdose. She identified her grandchildren as a reason to live  
  01-22-25  Type of therapy: Chair Yoga, Art Room  Type of session: Group  Level of patient participation: Participated with encouragement  Duration of participation: 60 minutes  Therapy conducted by: Psych rehab  Therapy Summary: Pt attended chair yoga and art therapy. She stated she was feeling a little better and states she has remorse about taking an overdose. She identified her grandchildren as a reason to live    01-23-25  Type of therapy: Safety planning  Type of session: Group  Level of patient participation: Engaged, Participates  Duration of participation: 45 minutes  Therapy conducted by: Psych rehab  Therapy Summary: Engaged in group and updated safety plan.    01-26-25  Type of therapy: Coping skills, Creative arts therapy  Type of session: Group  Level of patient participation: Engaged, Participates  Duration of participation: 60 minutes  Therapy conducted by: Psych rehab  Therapy Summary: Patient attended group therapy and recreation today. Shared goal to practice self love. Spoke to writer individually for support. Reporting feeling good. Discharge focused. Was unable to identify trigger to SA. Stated "I know I did a stupid thing." Education and support ongoing.    01-27-25  Type of therapy: Cognitive behavior therapy, Morning Stretching and Meditation   Type of session: Group  Level of patient participation: Engaged, Participates  Duration of participation: 60 minutes  Therapy conducted by: Psych rehab  Therapy Summary: Patient attended all group programming. Reporting improvements. Looking forward to discharge home.

## 2025-01-28 NOTE — BH INPATIENT PSYCHIATRY PROGRESS NOTE - NSBHASSESSSUMMFT_PSY_ALL_CORE
Patient a 65 y/o  retired female, domiciled with her , 2 adult children, past Psychiatric Hx of MDD for many years, hx of prior SA, most recently OD on Aspirin and admitted at [facility], medically has HTN ws BIB/EMS following OD on Klonopin.    1/28/2025: Patient seen today in day room on unit. When asked how she was doing patient replied "I feel a little anxious about discharge" when ask why patient replied "It's a big change." Discussed with patient the normalcy of anxiety prior to being discharge. Patient reports feeling better able to handle this anxiety at this time. Discussed with patient her day of discharge plan that includes going to seen her outpatient psychiatrist after she is discharged from the hospital. Also discussed with the patient different coping mechanisms that she intends to use if similar thoughts arise. Patient reports regret with her actions in reguards to her SA. Patient also discussed calling either a friend or crisis hotline if needed. Discussed with the patient on the importance of returning to  ER if needed. Patient displayed a understanding on these points a reported she would return if she feels the need for help. Patient will also be given a list of services she has access to by social work. Patient also made aware that we will be sending a two weeks supply of her medications home and imparted on her the importance of her follow up appointments. Patient currently denies depressive symptoms, SIIP, HIIP, and A/V hallucinations.    Plan:    1: Continue with inpatient admission at this time.    2: Continue medication as prescribed at this time.    3: Discuss with social work for discharge tomorrow (1/29/2025)

## 2025-01-28 NOTE — BH INPATIENT PSYCHIATRY DISCHARGE NOTE - HOSPITAL COURSE
Patient was admitted involuntarily from ED at . She has hx of Depression was BIB/EMS activated by her  when she OD on her prescribed Klonopin. Patient was sedated, but able to communicate in a feeble voice, that she did sought help for assistance but her Psychiatrist instead of endorsing anything advised to see her in 2 days, she was not feeling good and the next thing she OD on her prescribed Klonopin 0.5 and took 20 pills as suggested by her . She was later observed in Trauma for the next 6 hours and later admitted to [facility] ([place] for SA with OD following medical clearance. She endorses that she has hx of Depression for years and was stable on Paxil 20 mg daily for 20+ years, she decided to stop the Paxil as she seems to be doing so well. Since stopping her Paxil things went different, she relapsed with Depression and prior to OD on Klonopin she was admitted at [facility] due to OD on Aspirin. She was given Lexapro, to which she did not respond and discharged after 1-2 weeks stay and soon OD on Klonopin and was admitted at .    On admission to , she was on Abilify 2 mg BID with Klonopin 0.5 mg BID, her Abilify was discontinued as she was restless and after 2-3 days she is no longer Restless, her Rt. hand tremors soon improved and she started to sleep/eat well and was also started on Paxil 10 mg daily which she was on for 3-4 days , later Paxil was further increased to 20 mg Daily. She was also given Klonopin 0.25 mg daily. She soon Improved on Paxil/Klonopin combination. She regrets her SA, endorses that it ws th worst decision she took and now endorses that she is absolutely not depressed and not suicidal and willing to go home and enjoy life. She did participate in limited unit group activities and has no issues at this time. She has good sleep/appetite as well    Medically has HTN controlled with Cozaar 50 mg daily and Carvedilol 12.5 mg Q-12 daily    Her meds includes---    Paxil 20 mg Daily  Klonopin 0.25 mg Daily  Cozaar 50 mg Daily  Carvedilol 12.5 mg Q-12

## 2025-01-28 NOTE — BH INPATIENT PSYCHIATRY DISCHARGE NOTE - NSBHFUPINTERVALHXFT_PSY_A_CORE
Patient a 63 y/o  retired female, domiciled with her , 2 adult children, past Psychiatric Hx of MDD for many years, hx of prior SA, most recently OD on Aspirin and admitted at [facility], medically has HTN ws BIB/EMS following OD on Klonopin.    1/28/2025: Patient seen today in day room on unit. When asked how she was doing patient replied "I feel a little anxious about discharge" when ask why patient replied "It's a big change." Discussed with patient the normalcy of anxiety prior to being discharge. Patient reports feeling better able to handle this anxiety at this time. Discussed with patient her day of discharge plan that includes going to seen her outpatient psychiatrist after she is discharged from the hospital. Also discussed with the patient different coping mechanisms that she intends to use if similar thoughts arise. Patient also discussed calling either a friend or crisis hotline if needed. Discussed with the patient on the importance of returning to  ER if needed. Patient displayed a understanding on these points a reported she would return if she feels the need for help. Patient will also be given a list of services she has access to by social work. Patient also made aware that we will be sending a two weeks supply of her medications home and imparted on her the importance of her follow up appointments. Patient currently denies depressive symptoms, SIIP, HIIP, and A/V hallucinations.    1/27/2025: Patient seen in the day room today. Patient reports "feeling good" at this time. Patient smiling on and off throughout interview when appropriate. Patient at this time is both future and goal oriented. Patient discussing plan on returning home and realistic expectations on continuing treatment after discharge. Patient reports feelings of regret about her most recent SA. Patient discussed different coping mechanisms that she intends to use if those thoughts arise. Patient also discussed calling either a friend or crisis hotline if needed. Discussed with the patient on the importance of returning to  ER if needed. Patient stated she would if she needed. Patient currently denies depression, SIIP, HIIP, A/V hallucinations, or delusions at this time.    01/24/2025: Patient seen in day room, smiles on approach and while smiling asking for her discharge date. She endorses that she is 80 % better and has been sleeping relatively well, taking her meds, participates unit activities and endorses that she is back on her track within 48-72 hours since adding the Paxil 20mg daily. She was asked, about adding a 2nd anti-depressants for improved mood/affect and overall stability/safety and prevention future OD. She has no restlessness and was advised that it was attributed to the Abilify which has been associated with Akathisia/restlessness. She is on Klonopin 0.25 mg daily.

## 2025-01-28 NOTE — BH DISCHARGE NOTE NURSING/SOCIAL WORK/PSYCH REHAB - NSCDUDCCRISIS_PSY_A_CORE
.  [facility] Crisis [facility] for Children, Adults and Families  [address] [place] [address]  Mobile Crisis Hotline – [phone] [phone]/.  [facility]  [phone] [phone]/.  [facility] Response Crisis Hotline  [phone] [phone]  24 hour telephone crisis intervention and suicide prevention hotline concerned with all mental health issues/.  [facility]  [address] [place] [phone]   Hours:  Monday through Friday from 9 AM to 3 PM/Other.../988 Suicide and Crisis [facility]

## 2025-01-29 VITALS — TEMPERATURE: 98 F | RESPIRATION RATE: 18 BRPM | OXYGEN SATURATION: 100 %

## 2025-01-29 PROCEDURE — 99239 HOSP IP/OBS DSCHRG MGMT >30: CPT

## 2025-01-29 RX ADMIN — Medication 100 MILLIGRAM(S): at 08:43

## 2025-01-29 RX ADMIN — Medication 0.25 MILLIGRAM(S): at 08:43

## 2025-01-29 RX ADMIN — PAROXETINE HYDROCHLORIDE 20 MILLIGRAM(S): 10 TABLET, FILM COATED ORAL at 08:43

## 2025-01-29 RX ADMIN — Medication 25 MILLIGRAM(S): at 08:43

## 2025-01-29 NOTE — BH INPATIENT PSYCHIATRY PROGRESS NOTE - NSBHATTESTBILLING_PSY_A_CORE
57787-Krjohxhnri OBS or IP - moderate complexity OR 35-49 mins
05218-Doekxupwbf OBS or IP - moderate complexity OR 35-49 mins
[id number]-Ehapgkhdfy OBS or IP - moderate complexity OR 35-49 mins
64065-Nbrkbhufmv OBS or IP - moderate complexity OR 35-49 mins
[id number]-Ntwmjupfol OBS or IP - moderate complexity OR 35-49 mins
49335-Fbbrpxhmaq OBS or IP - moderate complexity OR 35-49 mins

## 2025-01-29 NOTE — BH INPATIENT PSYCHIATRY PROGRESS NOTE - NSBHASSESSSUMMFT_PSY_ALL_CORE
Patient a 63 y/o  retired female, domiciled with her , 2 adult children, past Psychiatric Hx of MDD for many years, hx of prior SA, most recently OD on Aspirin and admitted at [facility], medically has HTN ws BIB/EMS following OD on Klonopin.    1/28/2025: Patient seen today in day room on unit. When asked how she was doing patient replied "I feel a little anxious about discharge" when ask why patient replied "It's a big change." Discussed with patient the normalcy of anxiety prior to being discharge. Patient reports feeling better able to handle this anxiety at this time. Discussed with patient her day of discharge plan that includes going to seen her outpatient psychiatrist after she is discharged from the hospital. Also discussed with the patient different coping mechanisms that she intends to use if similar thoughts arise. Patient reports regret with her actions in reguards to her SA. Patient also discussed calling either a friend or crisis hotline if needed. Discussed with the patient on the importance of returning to  ER if needed. Patient displayed a understanding on these points a reported she would return if she feels the need for help. Patient will also be given a list of services she has access to by social work. Patient also made aware that we will be sending a two weeks supply of her medications home and imparted on her the importance of her follow up appointments. Patient currently denies depressive symptoms, SIIP, HIIP, and A/V hallucinations.    Plan:    1: Continue with inpatient admission at this time.    2: Continue medication as prescribed at this time.    3: Discuss with social work for discharge tomorrow (1/29/2025)

## 2025-01-29 NOTE — BH INPATIENT PSYCHIATRY PROGRESS NOTE - NSBHCHARTREVIEWVS_PSY_A_CORE FT
Vital Signs Last 24 Hrs  T(C): 36.5 (01-22-25 @ 07:27), Max: 36.5 (01-22-25 @ 07:27)  T(F): 97.7 (01-22-25 @ 07:27), Max: 97.7 (01-22-25 @ 07:27)  HR: 74 (01-21-25 @ 20:28) (74 - 74)  BP: 146/72 (01-21-25 @ 20:28) (146/72 - 146/72)  BP(mean): --  RR: 18 (01-22-25 @ 07:27) (18 - 18)  SpO2: 99% (01-22-25 @ 07:27) (99% - 99%)    Orthostatic VS  01-22-25 @ 07:27  Lying BP: --/-- HR: --  Sitting BP: 152/88 HR: 79  Standing BP: 156/88 HR: 83  Site: upper right arm  Mode: electronic  Orthostatic VS  01-21-25 @ 07:17  Lying BP: --/-- HR: --  Sitting BP: 154/91 HR: 87  Standing BP: 147/93 HR: 86  Site: upper right arm  Mode: electronic  Orthostatic VS  01-20-25 @ 22:13  Lying BP: --/-- HR: --  Sitting BP: 158/92 HR: 73  Standing BP: 129/80 HR: 86  Site: upper left arm  Mode: electronic  
Vital Signs Last 24 Hrs  T(C): 36.3 (01-23-25 @ 21:00), Max: 36.3 (01-23-25 @ 21:00)  T(F): 97.4 (01-23-25 @ 21:00), Max: 97.4 (01-23-25 @ 21:00)  HR: 70 (01-23-25 @ 21:00) (70 - 70)  BP: 139/73 (01-23-25 @ 21:00) (139/73 - 139/73)  BP(mean): --  RR: 18 (01-24-25 @ 07:13) (17 - 18)  SpO2: 100% (01-24-25 @ 07:13) (100% - 100%)    Orthostatic VS  01-24-25 @ 07:13  Lying BP: --/-- HR: --  Sitting BP: 153/78 HR: 68  Standing BP: 138/81 HR: 75  Site: upper right arm  Mode: electronic  Orthostatic VS  01-23-25 @ 07:10  Lying BP: --/-- HR: --  Sitting BP: 166/94 HR: 70  Standing BP: 156/84 HR: 70  Site: upper right arm  Mode: electronic  
Vital Signs Last 24 Hrs  T(C): 36.8 (01-29-25 @ 07:21), Max: 36.8 (01-29-25 @ 07:21)  T(F): 98.2 (01-29-25 @ 07:21), Max: 98.2 (01-29-25 @ 07:21)  HR: 67 (01-28-25 @ 19:38) (67 - 67)  BP: 135/82 (01-28-25 @ 19:38) (135/82 - 135/82)  BP(mean): --  RR: 18 (01-29-25 @ 07:21) (18 - 18)  SpO2: 100% (01-29-25 @ 07:21) (100% - 100%)    Orthostatic VS  01-29-25 @ 07:21  Lying BP: --/-- HR: --  Sitting BP: 148/83 HR: 77  Standing BP: 144/81 HR: 79  Site: upper right arm  Mode: electronic  Orthostatic VS  01-28-25 @ 07:37  Lying BP: --/-- HR: --  Sitting BP: 132/80 HR: 79  Standing BP: 123/84 HR: 86  Site: upper right arm  Mode: electronic  Orthostatic VS  01-27-25 @ 19:54  Lying BP: --/-- HR: --  Sitting BP: 120/72 HR: 72  Standing BP: --/-- HR: --  Site: upper right arm  Mode: electronic  
Vital Signs Last 24 Hrs  T(C): 36.6 (01-27-25 @ 07:43), Max: 36.6 (01-27-25 @ 07:43)  T(F): 97.9 (01-27-25 @ 07:43), Max: 97.9 (01-27-25 @ 07:43)  HR: --  BP: --  BP(mean): --  RR: 16 (01-27-25 @ 07:43) (16 - 16)  SpO2: 98% (01-27-25 @ 07:43) (98% - 98%)    Orthostatic VS  01-27-25 @ 07:43  Lying BP: --/-- HR: --  Sitting BP: 147/82 HR: 75  Standing BP: 143/86 HR: 79  Site: upper right arm  Mode: electronic  Orthostatic VS  01-26-25 @ 07:19  Lying BP: --/-- HR: --  Sitting BP: 144/83 HR: 74  Standing BP: 133/83 HR: 81  Site: upper right arm  Mode: electronic  
Vital Signs Last 24 Hrs  T(C): 36.4 (01-23-25 @ 07:10), Max: 36.4 (01-22-25 @ 21:00)  T(F): 97.6 (01-23-25 @ 07:10), Max: 97.6 (01-22-25 @ 21:00)  HR: 68 (01-22-25 @ 21:00) (68 - 68)  BP: 145/78 (01-22-25 @ 21:00) (145/78 - 145/78)  BP(mean): --  RR: 18 (01-23-25 @ 07:10) (17 - 18)  SpO2: 99% (01-23-25 @ 07:10) (99% - 100%)    Orthostatic VS  01-23-25 @ 07:10  Lying BP: --/-- HR: --  Sitting BP: 166/94 HR: 70  Standing BP: 156/84 HR: 70  Site: upper right arm  Mode: electronic  Orthostatic VS  01-22-25 @ 07:27  Lying BP: --/-- HR: --  Sitting BP: 152/88 HR: 79  Standing BP: 156/88 HR: 83  Site: upper right arm  Mode: electronic  
Vital Signs Last 24 Hrs  T(C): 36.7 (01-28-25 @ 07:37), Max: 36.8 (01-27-25 @ 19:54)  T(F): 98 (01-28-25 @ 07:37), Max: 98.3 (01-27-25 @ 19:54)  HR: --  BP: --  BP(mean): --  RR: 16 (01-28-25 @ 07:37) (16 - 16)  SpO2: 100% (01-28-25 @ 07:37) (95% - 100%)    Orthostatic VS  01-28-25 @ 07:37  Lying BP: --/-- HR: --  Sitting BP: 132/80 HR: 79  Standing BP: 123/84 HR: 86  Site: upper right arm  Mode: electronic  Orthostatic VS  01-27-25 @ 19:54  Lying BP: --/-- HR: --  Sitting BP: 120/72 HR: 72  Standing BP: --/-- HR: --  Site: upper right arm  Mode: electronic  Orthostatic VS  01-27-25 @ 07:43  Lying BP: --/-- HR: --  Sitting BP: 147/82 HR: 75  Standing BP: 143/86 HR: 79  Site: upper right arm  Mode: electronic

## 2025-01-29 NOTE — BH INPATIENT PSYCHIATRY PROGRESS NOTE - NSBHFUPINTERVALHXFT_PSY_A_CORE
Patient a 63 y/o  retired female, domiciled with her , 2 adult children, past Psychiatric Hx of MDD for many years, hx of prior SA, most recently OD on Aspirin and admitted at [facility], medically has HTN ws BIB/EMS following OD on Klonopin.    01/29/2025: Patient seen in unit today AM in the hallway. She endorses that she is no longer suicidal, regrets at what she did and was advised to keep in touch with her Therapist/Psychiatrist in crisis or if not to visit ED for assistance. She is not on drugs , medically has HTN and is controlled with meds as ordered. Her  to pick her up and meds were send to pharmacy as requested. She to f/u with Dr. Urrutia on discharge today @ 11AM.    1/28/2025: Patient seen today in day room on unit. When asked how she was doing patient replied "I feel a little anxious about discharge" when ask why patient replied "It's a big change." Discussed with patient the normalcy of anxiety prior to being discharge. Patient reports feeling better able to handle this anxiety at this time. Discussed with patient her day of discharge plan that includes going to seen her outpatient psychiatrist after she is discharged from the hospital. Also discussed with the patient different coping mechanisms that she intends to use if similar thoughts arise. Patient also discussed calling either a friend or crisis hotline if needed. Discussed with the patient on the importance of returning to  ER if needed. Patient displayed a understanding on these points a reported she would return if she feels the need for help. Patient will also be given a list of services she has access to by social work. Patient also made aware that we will be sending a two weeks supply of her medications home and imparted on her the importance of her follow up appointments. Patient currently denies depressive symptoms, SIIP, HIIP, and A/V hallucinations.    1/27/2025: Patient seen in the day room today. Patient reports "feeling good" at this time. Patient smiling on and off throughout interview when appropriate. Patient at this time is both future and goal oriented. Patient discussing plan on returning home and realistic expectations on continuing treatment after discharge. Patient reports feelings of regret about her most recent SA. Patient discussed different coping mechanisms that she intends to use if those thoughts arise. Patient also discussed calling either a friend or crisis hotline if needed. Discussed with the patient on the importance of returning to  ER if needed. Patient stated she would if she needed. Patient currently denies depression, SIIP, HIIP, A/V hallucinations, or delusions at this time.    01/24/2025: Patient seen in day room, smiles on approach and while smiling asking for her discharge date. She endorses that she is 80 % better and has been sleeping relatively well, taking her meds, participates unit activities and endorses that she is back on her track within 48-72 hours since adding the Paxil 20mg daily. She was asked, about adding a 2nd anti-depressants for improved mood/affect and overall stability/safety and prevention future OD. She has no restlessness and was advised that it was attributed to the Abilify which has been associated with Akathisia/restlessness. She is on Klonopin 0.25 mg daily.

## 2025-01-29 NOTE — BH INPATIENT PSYCHIATRY PROGRESS NOTE - NSTXDEPRESGOAL_PSY_ALL_CORE
Will identify thoughts and self-talk that contribute to depression
Will identify 2 coping skills that assist in improving mood
Will identify thoughts and self-talk that contribute to depression
Will identify 2 coping skills that assist in improving mood
Will identify 2 coping skills that assist in improving mood
Will identify thoughts and self-talk that contribute to depression

## 2025-01-29 NOTE — BH INPATIENT PSYCHIATRY PROGRESS NOTE - NSBHMETABOLIC_PSY_ALL_CORE_FT
BMI: BMI (kg/m2): 31.1 (01-20-25 @ 22:13)  HbA1c: A1C with Estimated Average Glucose Result: 5.7 % (01-21-25 @ 08:36)    Glucose:   BP: 148/75 (01-24-25 @ 20:30) (148/75 - 148/75)Vital Signs Last 24 Hrs  T(C): 36.6 (01-27-25 @ 07:43), Max: 36.6 (01-27-25 @ 07:43)  T(F): 97.9 (01-27-25 @ 07:43), Max: 97.9 (01-27-25 @ 07:43)  HR: --  BP: --  BP(mean): --  RR: 16 (01-27-25 @ 07:43) (16 - 16)  SpO2: 98% (01-27-25 @ 07:43) (98% - 98%)    Orthostatic VS  01-27-25 @ 07:43  Lying BP: --/-- HR: --  Sitting BP: 147/82 HR: 75  Standing BP: 143/86 HR: 79  Site: upper right arm  Mode: electronic  Orthostatic VS  01-26-25 @ 07:19  Lying BP: --/-- HR: --  Sitting BP: 144/83 HR: 74  Standing BP: 133/83 HR: 81  Site: upper right arm  Mode: electronic    Lipid Panel: Date/Time: 01-21-25 @ 08:36  Cholesterol, Serum: 159  LDL Cholesterol Calculated: 79  HDL Cholesterol, Serum: 44  Total Cholesterol/HDL Ration Measurement: --  Triglycerides, Serum: 212  
BMI: BMI (kg/m2): 31.1 (01-20-25 @ 22:13)  HbA1c: A1C with Estimated Average Glucose Result: 5.7 % (01-21-25 @ 08:36)    Glucose:   BP: 139/73 (01-23-25 @ 21:00) (139/73 - 146/72)Vital Signs Last 24 Hrs  T(C): 36.3 (01-23-25 @ 21:00), Max: 36.3 (01-23-25 @ 21:00)  T(F): 97.4 (01-23-25 @ 21:00), Max: 97.4 (01-23-25 @ 21:00)  HR: 70 (01-23-25 @ 21:00) (70 - 70)  BP: 139/73 (01-23-25 @ 21:00) (139/73 - 139/73)  BP(mean): --  RR: 18 (01-24-25 @ 07:13) (17 - 18)  SpO2: 100% (01-24-25 @ 07:13) (100% - 100%)    Orthostatic VS  01-24-25 @ 07:13  Lying BP: --/-- HR: --  Sitting BP: 153/78 HR: 68  Standing BP: 138/81 HR: 75  Site: upper right arm  Mode: electronic  Orthostatic VS  01-23-25 @ 07:10  Lying BP: --/-- HR: --  Sitting BP: 166/94 HR: 70  Standing BP: 156/84 HR: 70  Site: upper right arm  Mode: electronic    Lipid Panel: Date/Time: 01-21-25 @ 08:36  Cholesterol, Serum: 159  LDL Cholesterol Calculated: 79  HDL Cholesterol, Serum: 44  Total Cholesterol/HDL Ration Measurement: --  Triglycerides, Serum: 212  
BMI: BMI (kg/m2): 31.1 (01-20-25 @ 22:13)  HbA1c: A1C with Estimated Average Glucose Result: 5.7 % (01-21-25 @ 08:36)    Glucose:   BP: 135/82 (01-28-25 @ 19:38) (135/82 - 135/82)Vital Signs Last 24 Hrs  T(C): 36.8 (01-29-25 @ 07:21), Max: 36.8 (01-29-25 @ 07:21)  T(F): 98.2 (01-29-25 @ 07:21), Max: 98.2 (01-29-25 @ 07:21)  HR: 67 (01-28-25 @ 19:38) (67 - 67)  BP: 135/82 (01-28-25 @ 19:38) (135/82 - 135/82)  BP(mean): --  RR: 18 (01-29-25 @ 07:21) (18 - 18)  SpO2: 100% (01-29-25 @ 07:21) (100% - 100%)    Orthostatic VS  01-29-25 @ 07:21  Lying BP: --/-- HR: --  Sitting BP: 148/83 HR: 77  Standing BP: 144/81 HR: 79  Site: upper right arm  Mode: electronic  Orthostatic VS  01-28-25 @ 07:37  Lying BP: --/-- HR: --  Sitting BP: 132/80 HR: 79  Standing BP: 123/84 HR: 86  Site: upper right arm  Mode: electronic  Orthostatic VS  01-27-25 @ 19:54  Lying BP: --/-- HR: --  Sitting BP: 120/72 HR: 72  Standing BP: --/-- HR: --  Site: upper right arm  Mode: electronic    Lipid Panel: Date/Time: 01-21-25 @ 08:36  Cholesterol, Serum: 159  LDL Cholesterol Calculated: 79  HDL Cholesterol, Serum: 44  Total Cholesterol/HDL Ration Measurement: --  Triglycerides, Serum: 212  
BMI: BMI (kg/m2): 31.1 (01-20-25 @ 22:13)  HbA1c: A1C with Estimated Average Glucose Result: 5.7 % (01-21-25 @ 08:36)    Glucose:   BP: 146/72 (01-21-25 @ 20:28) (125/71 - 151/87)Vital Signs Last 24 Hrs  T(C): 36.5 (01-22-25 @ 07:27), Max: 36.5 (01-22-25 @ 07:27)  T(F): 97.7 (01-22-25 @ 07:27), Max: 97.7 (01-22-25 @ 07:27)  HR: 74 (01-21-25 @ 20:28) (74 - 74)  BP: 146/72 (01-21-25 @ 20:28) (146/72 - 146/72)  BP(mean): --  RR: 18 (01-22-25 @ 07:27) (18 - 18)  SpO2: 99% (01-22-25 @ 07:27) (99% - 99%)    Orthostatic VS  01-22-25 @ 07:27  Lying BP: --/-- HR: --  Sitting BP: 152/88 HR: 79  Standing BP: 156/88 HR: 83  Site: upper right arm  Mode: electronic  Orthostatic VS  01-21-25 @ 07:17  Lying BP: --/-- HR: --  Sitting BP: 154/91 HR: 87  Standing BP: 147/93 HR: 86  Site: upper right arm  Mode: electronic  Orthostatic VS  01-20-25 @ 22:13  Lying BP: --/-- HR: --  Sitting BP: 158/92 HR: 73  Standing BP: 129/80 HR: 86  Site: upper left arm  Mode: electronic    Lipid Panel: Date/Time: 01-21-25 @ 08:36  Cholesterol, Serum: 159  LDL Cholesterol Calculated: 79  HDL Cholesterol, Serum: 44  Total Cholesterol/HDL Ration Measurement: --  Triglycerides, Serum: 212  
BMI: BMI (kg/m2): 31.1 (01-20-25 @ 22:13)  HbA1c: A1C with Estimated Average Glucose Result: 5.7 % (01-21-25 @ 08:36)    Glucose:   BP: 145/78 (01-22-25 @ 21:00) (125/71 - 151/87)Vital Signs Last 24 Hrs  T(C): 36.4 (01-23-25 @ 07:10), Max: 36.4 (01-22-25 @ 21:00)  T(F): 97.6 (01-23-25 @ 07:10), Max: 97.6 (01-22-25 @ 21:00)  HR: 68 (01-22-25 @ 21:00) (68 - 68)  BP: 145/78 (01-22-25 @ 21:00) (145/78 - 145/78)  BP(mean): --  RR: 18 (01-23-25 @ 07:10) (17 - 18)  SpO2: 99% (01-23-25 @ 07:10) (99% - 100%)    Orthostatic VS  01-23-25 @ 07:10  Lying BP: --/-- HR: --  Sitting BP: 166/94 HR: 70  Standing BP: 156/84 HR: 70  Site: upper right arm  Mode: electronic  Orthostatic VS  01-22-25 @ 07:27  Lying BP: --/-- HR: --  Sitting BP: 152/88 HR: 79  Standing BP: 156/88 HR: 83  Site: upper right arm  Mode: electronic    Lipid Panel: Date/Time: 01-21-25 @ 08:36  Cholesterol, Serum: 159  LDL Cholesterol Calculated: 79  HDL Cholesterol, Serum: 44  Total Cholesterol/HDL Ration Measurement: --  Triglycerides, Serum: 212  
BMI: BMI (kg/m2): 31.1 (01-20-25 @ 22:13)  HbA1c: A1C with Estimated Average Glucose Result: 5.7 % (01-21-25 @ 08:36)    Glucose:   BP: --Vital Signs Last 24 Hrs  T(C): 36.7 (01-28-25 @ 07:37), Max: 36.8 (01-27-25 @ 19:54)  T(F): 98 (01-28-25 @ 07:37), Max: 98.3 (01-27-25 @ 19:54)  HR: --  BP: --  BP(mean): --  RR: 16 (01-28-25 @ 07:37) (16 - 16)  SpO2: 100% (01-28-25 @ 07:37) (95% - 100%)    Orthostatic VS  01-28-25 @ 07:37  Lying BP: --/-- HR: --  Sitting BP: 132/80 HR: 79  Standing BP: 123/84 HR: 86  Site: upper right arm  Mode: electronic  Orthostatic VS  01-27-25 @ 19:54  Lying BP: --/-- HR: --  Sitting BP: 120/72 HR: 72  Standing BP: --/-- HR: --  Site: upper right arm  Mode: electronic  Orthostatic VS  01-27-25 @ 07:43  Lying BP: --/-- HR: --  Sitting BP: 147/82 HR: 75  Standing BP: 143/86 HR: 79  Site: upper right arm  Mode: electronic    Lipid Panel: Date/Time: 01-21-25 @ 08:36  Cholesterol, Serum: 159  LDL Cholesterol Calculated: 79  HDL Cholesterol, Serum: 44  Total Cholesterol/HDL Ration Measurement: --  Triglycerides, Serum: 212

## 2025-01-29 NOTE — BH INPATIENT PSYCHIATRY PROGRESS NOTE - NSTXDEPRESINTERMD_PSY_ALL_CORE
01/22/2025: MDD--On Paxil 10 mg daily trial and now increased to Paxil 20 mg trial from 01/23 with Klonopin Wafer 0.25 mg daily from 01/23.
01/22/2025: MDD--On Paxil 10 mg daily trial and now increased to Paxil 20 mg trial from 01/23 with Klonopin Wafer 0.25 mg daily from 01/23.  01/28/2025: MDD--Doing well on Paxil 20 mg daily and Klonopin 0.25 mg daily.
01/22/2025: MDD--On Paxil 10 mg daily trial and now increased to Paxil 20 mg trial from 01/23 with Klonopin Wafer 0.25 mg daily from 01/23.
01/22/2025: MDD--On Paxil 10 mg daily trial and now increased to Paxil 20 mg trial from 01/23 with Klonopin Wafer 0.25 mg daily from 01/23.  01/28/2025: MDD--Doing well on Paxil 20 mg daily and Klonopin 0.25 mg daily.
01/22/2025: MDD--On Paxil 10 mg daily trial and now increased to Paxil 20 mg trial from 01/23 with Klonopin Wafer 0.25 mg daily from 01/23.

## 2025-01-29 NOTE — BH INPATIENT PSYCHIATRY PROGRESS NOTE - PRN MEDS
MEDICATIONS  (PRN):  acetaminophen     Tablet .. 1000 milliGRAM(s) Oral every 6 hours PRN Temp greater or equal to 38C (100.4F), Mild Pain (1 - 3)  LORazepam     Tablet 0.5 milliGRAM(s) Oral every 12 hours PRN Anxiety  
MEDICATIONS  (PRN):  acetaminophen     Tablet .. 1000 milliGRAM(s) Oral every 6 hours PRN Temp greater or equal to 38C (100.4F), Mild Pain (1 - 3)  melatonin 3 milliGRAM(s) Oral at bedtime PRN Insomnia  
MEDICATIONS  (PRN):  acetaminophen     Tablet .. 1000 milliGRAM(s) Oral every 6 hours PRN Temp greater or equal to 38C (100.4F), Mild Pain (1 - 3)  LORazepam     Tablet 0.5 milliGRAM(s) Oral every 12 hours PRN Anxiety  melatonin 3 milliGRAM(s) Oral at bedtime PRN Insomnia  
MEDICATIONS  (PRN):  acetaminophen     Tablet .. 1000 milliGRAM(s) Oral every 6 hours PRN Temp greater or equal to 38C (100.4F), Mild Pain (1 - 3)  LORazepam     Tablet 0.5 milliGRAM(s) Oral every 12 hours PRN Anxiety  melatonin 3 milliGRAM(s) Oral at bedtime PRN Insomnia  
MEDICATIONS  (PRN):  acetaminophen     Tablet .. 1000 milliGRAM(s) Oral every 6 hours PRN Temp greater or equal to 38C (100.4F), Mild Pain (1 - 3)  melatonin 3 milliGRAM(s) Oral at bedtime PRN Insomnia  
MEDICATIONS  (PRN):  acetaminophen     Tablet .. 1000 milliGRAM(s) Oral every 6 hours PRN Temp greater or equal to 38C (100.4F), Mild Pain (1 - 3)  LORazepam     Tablet 0.5 milliGRAM(s) Oral every 12 hours PRN Anxiety

## 2025-01-29 NOTE — BH INPATIENT PSYCHIATRY PROGRESS NOTE - NSTREATMENTCERTY_PSY_ALL_CORE
[facility] Medicine Services  PROGRESS NOTE    Patient Name: Ninoska Gloria  : 1933  MRN: [id number]    Date of Admission: 2024  Primary Care Physician: Lizette Fitzpatrick PA    Subjective   Subjective     CC:  F/u chest pain    HPI:  Chest pain much better. Had a headache w/ nitro gtt but that is improving now that it was stopped    Objective   Objective     Vital Signs:   Temp:  [97.6 °F (36.4 °C)-98.7 °F (37.1 °C)] 98.7 °F (37.1 °C)  Heart Rate:  [55-68] 55  Resp:  [16] 16  BP: (146-196)/(53-82) 146/53     Physical Exam:  Constitutional: Awake, alert, elderly female laying in bed in NAD  Respiratory: Clear to auscultation bilaterally, respiratory effort normal   Cardiovascular: RRR, palpable radial pulse  Gastrointestinal: Positive bowel sounds, soft, nontender, nondistended  Musculoskeletal: Trace LE edema  Psychiatric: Appropriate affect, cooperative  Neurologic: Hard of hearing    Results Reviewed:  LAB RESULTS:      Lab 24  1517   WBC 7.03 8.50  --  8.15   HEMOGLOBIN 10.1* 11.2*  --  10.3*   HEMATOCRIT 33.7* 37.1  --  35.4   PLATELETS 209 249  --  235   NEUTROS ABS 4.03 6.15  --  5.91   IMMATURE GRANS (ABS) 0.01 0.03  --  0.02   LYMPHS ABS 1.62 1.39  --  1.38   MONOS ABS 1.15* 0.78  --  0.78   EOS ABS 0.20 0.13  --  0.04   MCV 81.6 82.8  --  83.3   PROCALCITONIN  --   --   --  0.09   LACTATE  --   --  1.3  --          Lab 24  0408 24  1517   SODIUM 138 135* 142 142   POTASSIUM 4.3 4.3 4.5 4.3   CHLORIDE 103 99 104 106   CO2 26.0 26.0 29.0 25.0   ANION GAP 9.0 10.0 9.0 11.0   BUN 29* 31* 29* 38*   CREATININE 1.30* 1.33* 1.31* 1.55*   EGFR 38.4* 37.4* 38.1* 31.1*   GLUCOSE 76 99 84 152*   CALCIUM 9.2 9.3 10.1* 9.3   MAGNESIUM  --   --   --  1.8   PHOSPHORUS  --   --   --  3.4   HEMOGLOBIN A1C  --  5.90*  --   --    TSH  --  1.800  --  0.990         Lab 24  1323 24  5485 
That inpatient services furnished since the previous certification or recertification were, and continue to be required: for treatment that could reasonably be expected to improve the patient's condition; or, for diagnostic study;    That the hospital records show that the services furnished were: intensive treatment services; admission and related services necessary for diagnostic study or equivalent services;    That the patient continues to need, on a daily basis active inpatient treatment furnished directly by or requiring the supervision of inpatient psychiatric facility personnel.
  TOTAL PROTEIN 7.5 7.0   ALBUMIN 3.8 3.5   GLOBULIN 3.7 3.5   ALT (SGPT) 24 29   AST (SGOT) 37* 49*   BILIRUBIN 0.2 <0.2   ALK PHOS 124* 129*   LIPASE 36 38         Lab 07/05/24  1533 07/05/24  1323 07/01/24  1751 07/01/24  1517   PROBNP  --  3,885.0*  --  1,648.0   HSTROP T 63* 67* 82* 73*         Lab 07/06/24  0423   CHOLESTEROL 180   LDL CHOL 100   HDL CHOL 37*   TRIGLYCERIDES 251*             Brief Urine Lab Results  (Last result in the past 365 days)        Color   Clarity   Blood   Leuk Est   Nitrite   Protein   CREAT   Urine HCG        07/01/24 1629 Yellow   Clear   Negative   Small (1+)   Positive   100 mg/dL (2+)                   Microbiology Results Abnormal       Procedure Component Value - Date/Time    COVID PRE-OP / PRE-PROCEDURE SCREENING ORDER (NO ISOLATION) - Swab, Nasopharynx [[id number]]  (Normal) Collected: 07/05/24 1323    Lab Status: Final result Specimen: Swab from Nasopharynx Updated: 07/05/24 1424    Narrative:      The following orders were created for panel order COVID PRE-OP / PRE-PROCEDURE SCREENING ORDER (NO ISOLATION) - Swab, Nasopharynx.  Procedure                               Abnormality         Status                     ---------                               -----------         ------                     Respiratory Panel PCR w/...[[id number]]  Normal              Final result                 Please view results for these tests on the individual orders.    Respiratory Panel PCR w/COVID-19(SARS-CoV-2) ADAM/NATY/SHASTA/PAD/COR/KARLA In-House, NP Swab in UTM/VTM, 2 HR TAT - Swab, Nasopharynx [[id number]]  (Normal) Collected: 07/05/24 1323    Lab Status: Final result Specimen: Swab from Nasopharynx Updated: 07/05/24 1424     ADENOVIRUS, PCR Not Detected     Coronavirus 229E Not Detected     Coronavirus HKU1 Not Detected     Coronavirus NL63 Not Detected     Coronavirus OC43 Not Detected     COVID19 Not Detected     Human Metapneumovirus Not Detected     Human Rhinovirus/Enterovirus Not 
Detected     Influenza A PCR Not Detected     Influenza B PCR Not Detected     Parainfluenza Virus 1 Not Detected     Parainfluenza Virus 2 Not Detected     Parainfluenza Virus 3 Not Detected     Parainfluenza Virus 4 Not Detected     RSV, PCR Not Detected     Bordetella pertussis pcr Not Detected     Bordetella parapertussis PCR Not Detected     Chlamydophila pneumoniae PCR Not Detected     Mycoplasma pneumo by PCR Not Detected    Narrative:      In the setting of a positive respiratory panel with a viral infection PLUS a negative procalcitonin without other underlying concern for bacterial infection, consider observing off antibiotics or discontinuation of antibiotics and continue supportive care. If the respiratory panel is positive for atypical bacterial infection (Bordetella pertussis, Chlamydophila pneumoniae, or Mycoplasma pneumoniae), consider antibiotic de-escalation to target atypical bacterial infection.            Adult Transthoracic Echo Complete W/ Cont if Necessary Per Protocol    Result Date: 7/6/2024    Left ventricular systolic function is normal. Calculated left ventricular EF = 57.7%   Left ventricular wall thickness is consistent with hypertrophy.   Left atrial volume is moderately increased.   There is calcification of the aortic valve.   Moderate mitral valve regurgitation is present.   Moderate tricuspid valve regurgitation is present.   Estimated right ventricular systolic pressure from tricuspid regurgitation is mildly elevated (35-45 mmHg).      Results for orders placed during the hospital encounter of 07/05/24    Adult Transthoracic Echo Complete W/ Cont if Necessary Per Protocol    Interpretation Summary    Left ventricular systolic function is normal. Calculated left ventricular EF = 57.7%    Left ventricular wall thickness is consistent with hypertrophy.    Left atrial volume is moderately increased.    There is calcification of the aortic valve.    Moderate mitral valve regurgitation 
is present.    Moderate tricuspid valve regurgitation is present.    Estimated right ventricular systolic pressure from tricuspid regurgitation is mildly elevated (35-45 mmHg).      Current medications:  Scheduled Meds:aspirin, 81 mg, Oral, Daily  atorvastatin, 40 mg, Oral, Nightly  cefdinir, 300 mg, Oral, Q24H  dilTIAZem CD, 240 mg, Oral, Q24H  hydrALAZINE, 50 mg, Oral, Q12H  insulin glargine, 20 Units, Subcutaneous, Daily  insulin lispro, 2-9 Units, Subcutaneous, 4x Daily AC & at Bedtime  isosorbide mononitrate, 60 mg, Oral, Q24H  levothyroxine, 88 mcg, Oral, Daily  metoprolol tartrate, 25 mg, Oral, BID  multivitamin with minerals, 1 tablet, Oral, Daily  pantoprazole, 40 mg, Oral, Q AM  sodium chloride, 10 mL, Intravenous, Q12H  valsartan, 40 mg, Oral, Q24H  vitamin B-12, 1,000 mcg, Oral, Once per day on Sunday Tuesday Thursday Saturday      Continuous Infusions:     PRN Meds:.  acetaminophen    senna-docusate sodium **AND** polyethylene glycol **AND** bisacodyl **AND** bisacodyl    dextrose    dextrose    glucagon (human recombinant)    nitroglycerin    sodium chloride    sodium chloride    sodium chloride    Assessment & Plan   Assessment & Plan     Active Hospital Problems    Diagnosis  POA    **Chest pain [R07.9]  Yes    NSTEMI (non-ST elevated myocardial infarction) [I21.4]  Yes    Stage 3b chronic kidney disease [N18.32]  Yes    Chronic diastolic CHF (congestive heart failure) [I50.32]  Yes    GERD without esophagitis [K21.9]  Yes    Cirrhosis of liver without ascites [K74.60]  Yes    Coronary artery disease involving native coronary artery of native heart without angina pectoris [I25.10]  Yes    Essential hypertension [I10]  Yes    Dyslipidemia [E78.5]  Yes    Type 2 diabetes mellitus without complication, with long-term current use of insulin [E11.9, Z79.4]  Not Applicable    Paroxysmal atrial fibrillation [I48.0]  Yes      Resolved Hospital Problems   No resolved problems to display.        Brief Hospital 
Course to date:  Ninoska Gloria is a 89 y.o. female w/ CAD, HTN, HLD, Afib not on AC, HFpEF, DM2, CKD3, ACUNA cirrhosis, who presented w/ acute onset chest/epigastric pain initially improved w/ nitro then refractory to the same; troponin was minimally elevated on arrival and level on recheck; cardiology recommended medical management    Assessment/Plan    Acute chest/epigastric pain  CAD  HTN  HLD  Afib not on oral AC  Chronic HFpEF, compensated  Chronic O2 use  -seen by cardiology, rec'd medical management w/ nitro gtt and Tx dose lovenox  -TTE w/ EF 58%, mod MVR+TVR  -cont asa, statin, lopressor  -cards following, changed nitro gtt to imdur and stopping full dose lovenox  -pain improved this AM  -restarted hydralazine yesterday, add back diltiazem this AM; cards starting ARB    Abnormal UA  -seen in the ED 7/1 for generalized weakness, Dx'ed w/ UTI and Rx'ed oral Abx; Cx is 50k CFU E Coli  -doubt clinically significant UTI, to avoid partial Tx will cont cefdinir as prev Rx    DM type 2, A1c 5.9%, w/ insulin use  -BG borderline, dec lantus 25U to 20U, cont SSI    Cirrhosis w/o known ascites  CKD stage 3 - baseline 1.1-1.6; eGFR 30's  GERD - ppi  Hypothyroidism - levothyroxine  Dementia    *Patient lives w/ family who help assist with her care       Expected Discharge Location and Transportation: tbd pending clinical course, potentially home w/ family  Expected Discharge   Expected Discharge Date: 7/8/2024; Expected Discharge Time:      VTE Prophylaxis:  Mechanical VTE prophylaxis orders are present.         AM-PAC 6 Clicks Score (PT): 18 (07/07/24 0800)    CODE STATUS:   Code Status and Medical Interventions:   Ordered at: 07/05/24 1625     Level Of Support Discussed With:    Patient    Next of Kin (If No Surrogate)     Code Status (Patient has no pulse and is not breathing):    CPR (Attempt to Resuscitate)     Medical Interventions (Patient has pulse or is breathing):    Full Support       Jose Robertson, 
DO  07/07/24      
That inpatient services furnished since the previous certification or recertification were, and continue to be required: for treatment that could reasonably be expected to improve the patient's condition; or, for diagnostic study;    That the hospital records show that the services furnished were: intensive treatment services; admission and related services necessary for diagnostic study or equivalent services;    That the patient continues to need, on a daily basis active inpatient treatment furnished directly by or requiring the supervision of inpatient psychiatric facility personnel.

## 2025-01-29 NOTE — BH INPATIENT PSYCHIATRY PROGRESS NOTE - NSDCCRITERIA_PSY_ALL_CORE
When no longer Depressed and has no SI

## 2025-01-29 NOTE — BH INPATIENT PSYCHIATRY PROGRESS NOTE - NSTXDCOTHRGOAL_PSY_ALL_CORE
Pt. will have appt. for ongoing mental health care within 5-7 days of discharge.   Pt. will have safe housing upon discharge.   Pt. is insured and benefits are in place.   SW to explore w/ pt. the interest and need for substance use referral for discharge.

## 2025-01-29 NOTE — BH INPATIENT PSYCHIATRY PROGRESS NOTE - NSBHFUPINTERVALCCFT_PSY_A_CORE
"I feel a little anxious about discharge"
"Im doing better"
"Im feeling good"
"I feel a little anxious about discharge"

## 2025-01-31 NOTE — CHART NOTE - NSCHARTNOTEFT_GEN_A_CORE
Pt answered the phone and reported she was doing well and made it home safe. She had no questions or concerns. Pt was provided with the unit telephone number and encouraged to call if any questions or concerns arise.

## 2025-02-03 ENCOUNTER — NON-APPOINTMENT (OUTPATIENT)
Age: 65
End: 2025-02-03

## 2025-02-04 NOTE — CHART NOTE - NSCHARTNOTEFT_GEN_A_CORE
JULIANE rec'd call from pt.'s son Monica Thopre ([phone]) requesting call back to discuss d/c plan for pt. JULIANE called back and advised of limits to discussing d/c plan as did not have consent to discuss care with pt. son. Pt. son reports that pt. with concerns for decline in BH symptoms since pt. d/c including frequent crying and ongoing social withdrawal. He confirmed that pt. did connect to outpt. psychiatrist for therapist and med. management services and that this provider made additional med changes since pt.'s d/c. He denied any known SI from pt. at this time and does not believe requires return to the hospital but aware that pt. can return or have wellness visit sent from police as needed. He reports belief that pt. requires more intensive outpt. referral and seeking support from writer on resources available. SW provided general explanation of PHP vs. IOP vs. residential program. SW also discussed clinic vs. private provider for outpt. care. Pt. son aware that pt. and  provided w/ additional private providers if looking to change from current provider. Pt. son provided with contacts to [facility] w/ IOP BH services and also for [facility] clinic to explore as requested. Pt. son aware of [facility] and [facility] PHP programs and notes that other family has been exploring residential program. Pt. son noting that pt. reluctant to enter into more intensive program at this time and that pt.  not willing to "force" pt. to enter into program. SW discussed need for consent and voluntary participation for these programs. SW provided pt. son w/ contact to [facility] also as additional crisis resource for eval and referral as needed. Pt. son encouraged to discuss concerns with pt. current provider who pt. connected to after d/c and who is managing pt.'s care. Pt. son offered to speak to roel JOHNSON but pt. son declined. JULIANE reviewed recommendations to have pt. return to hospital as needed and pt. expressed his understanding to this counseling. Tx. team updated.

## 2025-02-06 ENCOUNTER — NON-APPOINTMENT (OUTPATIENT)
Age: 65
End: 2025-02-06

## 2025-02-06 ENCOUNTER — APPOINTMENT (OUTPATIENT)
Dept: DERMATOLOGY | Facility: CLINIC | Age: 65
End: 2025-02-06
Payer: MEDICARE

## 2025-02-06 VITALS — WEIGHT: 163 LBS | HEIGHT: 59 IN | BODY MASS INDEX: 32.86 KG/M2

## 2025-02-06 DIAGNOSIS — D48.5 NEOPLASM OF UNCERTAIN BEHAVIOR OF SKIN: ICD-10-CM

## 2025-02-06 DIAGNOSIS — Z12.83 ENCOUNTER FOR SCREENING FOR MALIGNANT NEOPLASM OF SKIN: ICD-10-CM

## 2025-02-06 DIAGNOSIS — D22.9 MELANOCYTIC NEVI, UNSPECIFIED: ICD-10-CM

## 2025-02-06 DIAGNOSIS — L81.4 OTHER MELANIN HYPERPIGMENTATION: ICD-10-CM

## 2025-02-06 DIAGNOSIS — D48.9 NEOPLASM OF UNCERTAIN BEHAVIOR, UNSPECIFIED: ICD-10-CM

## 2025-02-06 PROBLEM — I10 ESSENTIAL (PRIMARY) HYPERTENSION: Chronic | Status: ACTIVE | Noted: 2025-01-21

## 2025-02-06 PROCEDURE — 11102 TANGNTL BX SKIN SINGLE LES: CPT

## 2025-02-06 PROCEDURE — 99203 OFFICE O/P NEW LOW 30 MIN: CPT | Mod: 25

## 2025-02-06 PROCEDURE — 11103 TANGNTL BX SKIN EA SEP/ADDL: CPT

## 2025-02-07 ENCOUNTER — NON-APPOINTMENT (OUTPATIENT)
Age: 65
End: 2025-02-07

## 2025-02-07 ENCOUNTER — OFFICE (OUTPATIENT)
Dept: URBAN - METROPOLITAN AREA CLINIC 70 | Facility: CLINIC | Age: 65
Setting detail: OPHTHALMOLOGY
End: 2025-02-07
Payer: MEDICARE

## 2025-02-07 DIAGNOSIS — H11.31: ICD-10-CM

## 2025-02-07 PROCEDURE — 99203 OFFICE O/P NEW LOW 30 MIN: CPT | Performed by: STUDENT IN AN ORGANIZED HEALTH CARE EDUCATION/TRAINING PROGRAM

## 2025-02-07 ASSESSMENT — CONFRONTATIONAL VISUAL FIELD TEST (CVF)
OD_FINDINGS: FULL
OS_FINDINGS: FULL

## 2025-02-07 ASSESSMENT — VISUAL ACUITY
OS_BCVA: 20/20-
OD_BCVA: 20/20

## 2025-02-14 LAB — CORE LAB BIOPSY: NORMAL

## 2025-03-12 ENCOUNTER — APPOINTMENT (OUTPATIENT)
Dept: INTERNAL MEDICINE | Facility: CLINIC | Age: 65
End: 2025-03-12
Payer: MEDICARE

## 2025-03-12 VITALS — SYSTOLIC BLOOD PRESSURE: 118 MMHG | DIASTOLIC BLOOD PRESSURE: 76 MMHG

## 2025-03-12 VITALS
BODY MASS INDEX: 30.86 KG/M2 | HEIGHT: 58 IN | SYSTOLIC BLOOD PRESSURE: 139 MMHG | HEART RATE: 77 BPM | DIASTOLIC BLOOD PRESSURE: 88 MMHG | RESPIRATION RATE: 16 BRPM | OXYGEN SATURATION: 96 % | WEIGHT: 147 LBS | TEMPERATURE: 98.5 F

## 2025-03-12 DIAGNOSIS — F32.A DEPRESSION, UNSPECIFIED: ICD-10-CM

## 2025-03-12 DIAGNOSIS — I10 ESSENTIAL (PRIMARY) HYPERTENSION: ICD-10-CM

## 2025-03-12 DIAGNOSIS — J44.9 CHRONIC OBSTRUCTIVE PULMONARY DISEASE, UNSPECIFIED: ICD-10-CM

## 2025-03-12 DIAGNOSIS — Z87.891 PERSONAL HISTORY OF NICOTINE DEPENDENCE: ICD-10-CM

## 2025-03-12 DIAGNOSIS — Z00.00 ENCOUNTER FOR GENERAL ADULT MEDICAL EXAMINATION W/OUT ABNORMAL FINDINGS: ICD-10-CM

## 2025-03-12 DIAGNOSIS — F41.9 ANXIETY DISORDER, UNSPECIFIED: ICD-10-CM

## 2025-03-12 DIAGNOSIS — E78.5 HYPERLIPIDEMIA, UNSPECIFIED: ICD-10-CM

## 2025-03-12 DIAGNOSIS — J98.4 OTHER DISORDERS OF LUNG: ICD-10-CM

## 2025-03-12 PROCEDURE — ZZZZZ: CPT

## 2025-03-12 PROCEDURE — 99214 OFFICE O/P EST MOD 30 MIN: CPT | Mod: 25

## 2025-03-12 PROCEDURE — 94729 DIFFUSING CAPACITY: CPT

## 2025-03-12 PROCEDURE — 94060 EVALUATION OF WHEEZING: CPT

## 2025-03-12 PROCEDURE — 94727 GAS DIL/WSHOT DETER LNG VOL: CPT

## 2025-03-12 RX ORDER — ARIPIPRAZOLE 2 MG/1
2 TABLET ORAL DAILY
Refills: 0 | Status: ACTIVE | COMMUNITY

## 2025-03-22 ENCOUNTER — EMERGENCY (EMERGENCY)
Facility: HOSPITAL | Age: 65
LOS: 0 days | Discharge: HOME | End: 2025-03-22
Attending: EMERGENCY MEDICINE
Payer: MEDICARE

## 2025-03-22 VITALS
RESPIRATION RATE: 15 BRPM | DIASTOLIC BLOOD PRESSURE: 88 MMHG | HEART RATE: 68 BPM | SYSTOLIC BLOOD PRESSURE: 158 MMHG | OXYGEN SATURATION: 100 %

## 2025-03-22 VITALS
TEMPERATURE: 98 F | RESPIRATION RATE: 19 BRPM | OXYGEN SATURATION: 98 % | HEART RATE: 80 BPM | SYSTOLIC BLOOD PRESSURE: 159 MMHG | DIASTOLIC BLOOD PRESSURE: 110 MMHG | WEIGHT: 149.91 LBS

## 2025-03-22 DIAGNOSIS — F41.9 ANXIETY DISORDER, UNSPECIFIED: ICD-10-CM

## 2025-03-22 DIAGNOSIS — Z88.0 ALLERGY STATUS TO PENICILLIN: ICD-10-CM

## 2025-03-22 DIAGNOSIS — F32.A DEPRESSION, UNSPECIFIED: ICD-10-CM

## 2025-03-22 DIAGNOSIS — R10.31 RIGHT LOWER QUADRANT PAIN: ICD-10-CM

## 2025-03-22 DIAGNOSIS — I10 ESSENTIAL (PRIMARY) HYPERTENSION: ICD-10-CM

## 2025-03-22 DIAGNOSIS — R10.9 UNSPECIFIED ABDOMINAL PAIN: ICD-10-CM

## 2025-03-22 LAB
ALBUMIN SERPL ELPH-MCNC: 3.8 G/DL — SIGNIFICANT CHANGE UP (ref 3.3–5)
ALP SERPL-CCNC: 46 U/L — SIGNIFICANT CHANGE UP (ref 40–120)
ALT FLD-CCNC: 32 U/L — SIGNIFICANT CHANGE UP (ref 12–78)
AMPHET UR-MCNC: NEGATIVE — SIGNIFICANT CHANGE UP
ANION GAP SERPL CALC-SCNC: 4 MMOL/L — LOW (ref 5–17)
APAP SERPL-MCNC: < 2 UG/ML (ref 10–30)
APPEARANCE UR: CLEAR — SIGNIFICANT CHANGE UP
AST SERPL-CCNC: 17 U/L — SIGNIFICANT CHANGE UP (ref 15–37)
BACTERIA # UR AUTO: NEGATIVE /HPF — SIGNIFICANT CHANGE UP
BARBITURATES UR SCN-MCNC: NEGATIVE — SIGNIFICANT CHANGE UP
BASOPHILS # BLD AUTO: 0.01 K/UL — SIGNIFICANT CHANGE UP (ref 0–0.2)
BASOPHILS NFR BLD AUTO: 0.1 % — SIGNIFICANT CHANGE UP (ref 0–2)
BENZODIAZ UR-MCNC: NEGATIVE — SIGNIFICANT CHANGE UP
BILIRUB SERPL-MCNC: 0.7 MG/DL — SIGNIFICANT CHANGE UP (ref 0.2–1.2)
BILIRUB UR-MCNC: NEGATIVE — SIGNIFICANT CHANGE UP
BUN SERPL-MCNC: 13 MG/DL — SIGNIFICANT CHANGE UP (ref 7–23)
CALCIUM SERPL-MCNC: 9.7 MG/DL — SIGNIFICANT CHANGE UP (ref 8.5–10.1)
CAST: 3 /LPF — SIGNIFICANT CHANGE UP (ref 0–4)
CHLORIDE SERPL-SCNC: 108 MMOL/L — SIGNIFICANT CHANGE UP (ref 96–108)
CO2 SERPL-SCNC: 27 MMOL/L — SIGNIFICANT CHANGE UP (ref 22–31)
COCAINE METAB.OTHER UR-MCNC: NEGATIVE — SIGNIFICANT CHANGE UP
COLOR SPEC: YELLOW — SIGNIFICANT CHANGE UP
CREAT SERPL-MCNC: 0.88 MG/DL — SIGNIFICANT CHANGE UP (ref 0.5–1.3)
DIFF PNL FLD: ABNORMAL
EGFR: 72 ML/MIN/1.73M2 — SIGNIFICANT CHANGE UP
EGFR: 72 ML/MIN/1.73M2 — SIGNIFICANT CHANGE UP
EOSINOPHIL # BLD AUTO: 0 K/UL — SIGNIFICANT CHANGE UP (ref 0–0.5)
EOSINOPHIL NFR BLD AUTO: 0 % — SIGNIFICANT CHANGE UP (ref 0–6)
ETHANOL SERPL-MCNC: <10 MG/DL — SIGNIFICANT CHANGE UP (ref 0–10)
FENTANYL UR QL SCN: NEGATIVE — SIGNIFICANT CHANGE UP
FLUAV AG NPH QL: SIGNIFICANT CHANGE UP
FLUBV AG NPH QL: SIGNIFICANT CHANGE UP
GLUCOSE SERPL-MCNC: 107 MG/DL — HIGH (ref 70–99)
GLUCOSE UR QL: NEGATIVE MG/DL — SIGNIFICANT CHANGE UP
HCT VFR BLD CALC: 41.7 % — SIGNIFICANT CHANGE UP (ref 34.5–45)
HGB BLD-MCNC: 14.1 G/DL — SIGNIFICANT CHANGE UP (ref 11.5–15.5)
IMM GRANULOCYTES # BLD AUTO: 0.01 K/UL — SIGNIFICANT CHANGE UP (ref 0–0.07)
IMM GRANULOCYTES NFR BLD AUTO: 0.1 % — SIGNIFICANT CHANGE UP (ref 0–0.9)
KETONES UR-MCNC: NEGATIVE MG/DL — SIGNIFICANT CHANGE UP
LEUKOCYTE ESTERASE UR-ACNC: NEGATIVE — SIGNIFICANT CHANGE UP
LIDOCAIN IGE QN: 46 U/L — SIGNIFICANT CHANGE UP (ref 13–75)
LYMPHOCYTES # BLD AUTO: 0.99 K/UL — LOW (ref 1–3.3)
LYMPHOCYTES NFR BLD AUTO: 12.5 % — LOW (ref 13–44)
MCHC RBC-ENTMCNC: 28.7 PG — SIGNIFICANT CHANGE UP (ref 27–34)
MCHC RBC-ENTMCNC: 33.8 G/DL — SIGNIFICANT CHANGE UP (ref 32–36)
MCV RBC AUTO: 84.8 FL — SIGNIFICANT CHANGE UP (ref 80–100)
METHADONE UR-MCNC: NEGATIVE — SIGNIFICANT CHANGE UP
MONOCYTES # BLD AUTO: 0.5 K/UL — SIGNIFICANT CHANGE UP (ref 0–0.9)
MONOCYTES NFR BLD AUTO: 6.3 % — SIGNIFICANT CHANGE UP (ref 2–14)
NEUTROPHILS # BLD AUTO: 6.42 K/UL — SIGNIFICANT CHANGE UP (ref 1.8–7.4)
NEUTROPHILS NFR BLD AUTO: 81 % — HIGH (ref 43–77)
NITRITE UR-MCNC: NEGATIVE — SIGNIFICANT CHANGE UP
NRBC # BLD AUTO: 0 K/UL — SIGNIFICANT CHANGE UP (ref 0–0)
NRBC # FLD: 0 K/UL — SIGNIFICANT CHANGE UP (ref 0–0)
NRBC BLD AUTO-RTO: 0 /100 WBCS — SIGNIFICANT CHANGE UP (ref 0–0)
OPIATES UR-MCNC: NEGATIVE — SIGNIFICANT CHANGE UP
PCP SPEC-MCNC: SIGNIFICANT CHANGE UP
PCP UR-MCNC: NEGATIVE — SIGNIFICANT CHANGE UP
PH UR: 5 — SIGNIFICANT CHANGE UP (ref 5–8)
PLATELET # BLD AUTO: 209 K/UL — SIGNIFICANT CHANGE UP (ref 150–400)
PMV BLD: 9.4 FL — SIGNIFICANT CHANGE UP (ref 7–13)
POTASSIUM SERPL-MCNC: 3.2 MMOL/L — LOW (ref 3.5–5.3)
POTASSIUM SERPL-SCNC: 3.2 MMOL/L — LOW (ref 3.5–5.3)
PROT SERPL-MCNC: 7.5 GM/DL — SIGNIFICANT CHANGE UP (ref 6–8.3)
PROT UR-MCNC: NEGATIVE MG/DL — SIGNIFICANT CHANGE UP
RBC # BLD: 4.92 M/UL — SIGNIFICANT CHANGE UP (ref 3.8–5.2)
RBC # FLD: 14 % — SIGNIFICANT CHANGE UP (ref 10.3–14.5)
RBC CASTS # UR COMP ASSIST: 1 /HPF — SIGNIFICANT CHANGE UP (ref 0–4)
RSV RNA NPH QL NAA+NON-PROBE: SIGNIFICANT CHANGE UP
SALICYLATES SERPL-MCNC: <1.7 MG/DL — LOW (ref 2.8–20)
SARS-COV-2 RNA SPEC QL NAA+PROBE: SIGNIFICANT CHANGE UP
SODIUM SERPL-SCNC: 139 MMOL/L — SIGNIFICANT CHANGE UP (ref 135–145)
SOURCE RESPIRATORY: SIGNIFICANT CHANGE UP
SP GR SPEC: >1.03 — HIGH (ref 1–1.03)
SQUAMOUS # UR AUTO: 1 /HPF — SIGNIFICANT CHANGE UP (ref 0–5)
THC UR QL: NEGATIVE — SIGNIFICANT CHANGE UP
UROBILINOGEN FLD QL: 0.2 MG/DL — SIGNIFICANT CHANGE UP (ref 0.2–1)
WBC # BLD: 7.93 K/UL — SIGNIFICANT CHANGE UP (ref 3.8–10.5)
WBC # FLD AUTO: 7.93 K/UL — SIGNIFICANT CHANGE UP (ref 3.8–10.5)
WBC UR QL: 2 /HPF — SIGNIFICANT CHANGE UP (ref 0–5)

## 2025-03-22 PROCEDURE — 81001 URINALYSIS AUTO W/SCOPE: CPT

## 2025-03-22 PROCEDURE — 80307 DRUG TEST PRSMV CHEM ANLYZR: CPT

## 2025-03-22 PROCEDURE — 80053 COMPREHEN METABOLIC PANEL: CPT

## 2025-03-22 PROCEDURE — 76830 TRANSVAGINAL US NON-OB: CPT | Mod: 26

## 2025-03-22 PROCEDURE — 90792 PSYCH DIAG EVAL W/MED SRVCS: CPT | Mod: 93

## 2025-03-22 PROCEDURE — 93975 VASCULAR STUDY: CPT

## 2025-03-22 PROCEDURE — 96374 THER/PROPH/DIAG INJ IV PUSH: CPT | Mod: XU

## 2025-03-22 PROCEDURE — 93010 ELECTROCARDIOGRAM REPORT: CPT

## 2025-03-22 PROCEDURE — 85025 COMPLETE CBC W/AUTO DIFF WBC: CPT

## 2025-03-22 PROCEDURE — 93975 VASCULAR STUDY: CPT | Mod: 26

## 2025-03-22 PROCEDURE — 99285 EMERGENCY DEPT VISIT HI MDM: CPT

## 2025-03-22 PROCEDURE — 83690 ASSAY OF LIPASE: CPT

## 2025-03-22 PROCEDURE — 99285 EMERGENCY DEPT VISIT HI MDM: CPT | Mod: 25

## 2025-03-22 PROCEDURE — 0241U: CPT

## 2025-03-22 PROCEDURE — 93005 ELECTROCARDIOGRAM TRACING: CPT

## 2025-03-22 PROCEDURE — 36415 COLL VENOUS BLD VENIPUNCTURE: CPT

## 2025-03-22 PROCEDURE — 74177 CT ABD & PELVIS W/CONTRAST: CPT | Mod: 26

## 2025-03-22 PROCEDURE — 76830 TRANSVAGINAL US NON-OB: CPT

## 2025-03-22 PROCEDURE — 74177 CT ABD & PELVIS W/CONTRAST: CPT

## 2025-03-22 RX ORDER — CARVEDILOL 3.12 MG/1
25 TABLET, FILM COATED ORAL EVERY 12 HOURS
Refills: 0 | Status: DISCONTINUED | OUTPATIENT
Start: 2025-03-22 | End: 2025-03-22

## 2025-03-22 RX ORDER — ACETAMINOPHEN 500 MG/5ML
1000 LIQUID (ML) ORAL ONCE
Refills: 0 | Status: COMPLETED | OUTPATIENT
Start: 2025-03-22 | End: 2025-03-22

## 2025-03-22 RX ADMIN — Medication 1000 MILLILITER(S): at 12:38

## 2025-03-22 RX ADMIN — Medication 1000 MILLIGRAM(S): at 14:36

## 2025-03-22 RX ADMIN — Medication 400 MILLIGRAM(S): at 12:36

## 2025-04-01 ENCOUNTER — OUTPATIENT (OUTPATIENT)
Dept: OUTPATIENT SERVICES | Facility: HOSPITAL | Age: 65
LOS: 1 days | Discharge: ANOTHER HOSPITAL | End: 2025-04-01
Payer: MEDICARE

## 2025-04-01 PROCEDURE — 90839 PSYTX CRISIS INITIAL 60 MIN: CPT

## 2025-04-16 ENCOUNTER — APPOINTMENT (OUTPATIENT)
Dept: CARDIOLOGY | Facility: CLINIC | Age: 65
End: 2025-04-16

## 2025-04-24 ENCOUNTER — NON-APPOINTMENT (OUTPATIENT)
Age: 65
End: 2025-04-24

## 2025-04-24 ENCOUNTER — APPOINTMENT (OUTPATIENT)
Dept: DERMATOLOGY | Facility: CLINIC | Age: 65
End: 2025-04-24
Payer: MEDICARE

## 2025-04-24 DIAGNOSIS — D23.9 OTHER BENIGN NEOPLASM OF SKIN, UNSPECIFIED: ICD-10-CM

## 2025-04-24 DIAGNOSIS — D22.9 MELANOCYTIC NEVI, UNSPECIFIED: ICD-10-CM

## 2025-04-24 DIAGNOSIS — L81.4 OTHER MELANIN HYPERPIGMENTATION: ICD-10-CM

## 2025-04-24 PROCEDURE — 99213 OFFICE O/P EST LOW 20 MIN: CPT

## 2025-05-01 NOTE — BH PATIENT PROFILE - FUNCTIONAL ASSESSMENT - BASIC MOBILITY 4.
Name: Royce Niño      : 1941      MRN: [id number]  Encounter Provider: Annie Salvador DO  Encounter Date: 2025   Encounter department: [facility] CARE [address]   :  Assessment & Plan  Impaired fasting glucose  Sugars better and in nml range but A1c mildly elevated at 5.7, con't to encourage healthy diet and keep active, recheck BW annually, will follow     Dyslipidemia  LDL at goal, con't current statin, con't to encourage healthy diet and regular formal exercise, will follow FLP annually       Stage 3a chronic kidney disease (HCC)  Lab Results   Component Value Date    EGFR 73 2025    EGFR 66 2024    EGFR 55 2024    CREATININE 0.94 2025    CREATININE 1.02 2024    CREATININE 1.19 2024   GFR again in CKD stage 2, importance of BP/BS control reviewed, not on an ACE/ARB, will follow annually as has been stable       Essential hypertension  BP great, con't current Doxazosin, will recheck in 6 mos or so            BW  (A1C 5.7) - annually      History of Present Illness   HPI Pt here for follow up appt and BW results    BW results were d/w pt in detail: FBS/A1C 93/5.7, rest of CMP/CBC/TSH/FLP were all wnl    Def of nml vs IFG vs DM was d/w pt in detail. Diet/exercise was reviewed - wgt up 1 lb from . He admits his diet is balanced but he likes red meats/hot dogs. He does bowl one night a week and does cut the grass with push mower and .     Goal FLP was d/w pt in detail.  Diet/exercise reviewed as noted above.  He is taking his Simvastatin daily as directed w/o Se.  He notes no stroke/TIA symptoms/CP.     BP at goal today and meds were reviewed and no changes have occurred.  He denies missing doses of meds or SE with the meds.  He does not check his BP outside the office.  He notes no frequent HA's/dizziness/double vision/CP.       Review of Systems   Constitutional:  Negative for chills and fever.   HENT:  Positive for  "rhinorrhea. Negative for congestion and sore throat.    Eyes:  Negative for pain and visual disturbance.   Respiratory:  Negative for cough, shortness of breath and wheezing.    Cardiovascular:  Negative for chest pain, palpitations and leg swelling.   Gastrointestinal:  Negative for abdominal pain, blood in stool, constipation, diarrhea, nausea and vomiting.   Genitourinary:  Negative for difficulty urinating, dysuria and hematuria.   Musculoskeletal:  Negative for arthralgias, gait problem and myalgias.   Skin:  Negative for rash and wound.   Neurological:  Negative for dizziness and headaches.   Hematological:  Does not bruise/bleed easily.   Psychiatric/Behavioral:  Negative for confusion and dysphoric mood.        Objective   /73 (BP Location: Left arm, Patient Position: Sitting, Cuff Size: Standard)   Pulse 73   Temp 97.9 °F (36.6 °C)   Ht 5' 10\" (1.778 m)   Wt 86 kg (189 lb 9.6 oz)   SpO2 97%   BMI 27.20 kg/m²      Physical Exam  Vitals and nursing note reviewed.   Constitutional:       General: He is not in acute distress.     Appearance: He is well-developed. He is not ill-appearing.   HENT:      Head: Normocephalic and atraumatic.      Right Ear: External ear normal.      Left Ear: External ear normal.   Eyes:      General:         Right eye: No discharge.         Left eye: No discharge.      Conjunctiva/sclera: Conjunctivae normal.   Neck:      Trachea: No tracheal deviation.   Cardiovascular:      Rate and Rhythm: Normal rate and regular rhythm.      Heart sounds: Normal heart sounds. No murmur heard.  Pulmonary:      Effort: Pulmonary effort is normal. No respiratory distress.      Breath sounds: Normal breath sounds. No wheezing, rhonchi or rales.   Abdominal:      General: There is no distension.      Palpations: Abdomen is soft.      Tenderness: There is no abdominal tenderness. There is no guarding or rebound.   Musculoskeletal:         General: No deformity or signs of injury.      " Cervical back: Neck supple.   Skin:     General: Skin is warm and dry.      Coloration: Skin is not pale.      Findings: No rash.   Neurological:      General: No focal deficit present.      Mental Status: He is alert. Mental status is at baseline.      Motor: No abnormal muscle tone.      Gait: Gait normal.   Psychiatric:         Mood and Affect: Mood normal.         Behavior: Behavior normal.         Thought Content: Thought content normal.         Judgment: Judgment normal.          3 = A little assistance

## 2025-05-27 ENCOUNTER — EMERGENCY (EMERGENCY)
Facility: HOSPITAL | Age: 65
LOS: 0 days | Discharge: ANOTHER HOSPITAL | End: 2025-05-28
Attending: EMERGENCY MEDICINE
Payer: MEDICARE

## 2025-05-27 VITALS
HEART RATE: 112 BPM | DIASTOLIC BLOOD PRESSURE: 104 MMHG | RESPIRATION RATE: 20 BRPM | SYSTOLIC BLOOD PRESSURE: 126 MMHG | OXYGEN SATURATION: 99 %

## 2025-05-27 DIAGNOSIS — Z88.0 ALLERGY STATUS TO PENICILLIN: ICD-10-CM

## 2025-05-27 DIAGNOSIS — Z04.6 ENCOUNTER FOR GENERAL PSYCHIATRIC EXAMINATION, REQUESTED BY AUTHORITY: ICD-10-CM

## 2025-05-27 DIAGNOSIS — F39 UNSPECIFIED MOOD [AFFECTIVE] DISORDER: ICD-10-CM

## 2025-05-27 DIAGNOSIS — F41.9 ANXIETY DISORDER, UNSPECIFIED: ICD-10-CM

## 2025-05-27 LAB
ALBUMIN SERPL ELPH-MCNC: 3.8 G/DL — SIGNIFICANT CHANGE UP (ref 3.3–5)
ALP SERPL-CCNC: 49 U/L — SIGNIFICANT CHANGE UP (ref 40–120)
ALT FLD-CCNC: 28 U/L — SIGNIFICANT CHANGE UP (ref 12–78)
AMPHET UR-MCNC: NEGATIVE — SIGNIFICANT CHANGE UP
ANION GAP SERPL CALC-SCNC: 10 MMOL/L — SIGNIFICANT CHANGE UP (ref 5–17)
APAP SERPL-MCNC: < 2 UG/ML (ref 10–30)
APPEARANCE UR: ABNORMAL
AST SERPL-CCNC: 15 U/L — SIGNIFICANT CHANGE UP (ref 15–37)
BACTERIA # UR AUTO: ABNORMAL /HPF
BARBITURATES UR SCN-MCNC: NEGATIVE — SIGNIFICANT CHANGE UP
BASOPHILS # BLD AUTO: 0.01 K/UL — SIGNIFICANT CHANGE UP (ref 0–0.2)
BASOPHILS NFR BLD AUTO: 0.1 % — SIGNIFICANT CHANGE UP (ref 0–2)
BENZODIAZ UR-MCNC: NEGATIVE — SIGNIFICANT CHANGE UP
BILIRUB SERPL-MCNC: 0.8 MG/DL — SIGNIFICANT CHANGE UP (ref 0.2–1.2)
BILIRUB UR-MCNC: NEGATIVE — SIGNIFICANT CHANGE UP
BUN SERPL-MCNC: 20 MG/DL — SIGNIFICANT CHANGE UP (ref 7–23)
CALCIUM SERPL-MCNC: 9.9 MG/DL — SIGNIFICANT CHANGE UP (ref 8.5–10.1)
CAST: 10 /LPF — HIGH (ref 0–4)
CHLORIDE SERPL-SCNC: 109 MMOL/L — HIGH (ref 96–108)
CO2 SERPL-SCNC: 22 MMOL/L — SIGNIFICANT CHANGE UP (ref 22–31)
COCAINE METAB.OTHER UR-MCNC: NEGATIVE — SIGNIFICANT CHANGE UP
COLOR SPEC: SIGNIFICANT CHANGE UP
CREAT SERPL-MCNC: 1.26 MG/DL — SIGNIFICANT CHANGE UP (ref 0.5–1.3)
DIFF PNL FLD: NEGATIVE — SIGNIFICANT CHANGE UP
EGFR: 47 ML/MIN/1.73M2 — LOW
EGFR: 47 ML/MIN/1.73M2 — LOW
EOSINOPHIL # BLD AUTO: 0 K/UL — SIGNIFICANT CHANGE UP (ref 0–0.5)
EOSINOPHIL NFR BLD AUTO: 0 % — SIGNIFICANT CHANGE UP (ref 0–6)
ETHANOL SERPL-MCNC: <10 MG/DL — SIGNIFICANT CHANGE UP (ref 0–10)
FENTANYL UR QL SCN: NEGATIVE — SIGNIFICANT CHANGE UP
FLUAV AG NPH QL: SIGNIFICANT CHANGE UP
FLUBV AG NPH QL: SIGNIFICANT CHANGE UP
GLUCOSE SERPL-MCNC: 267 MG/DL — HIGH (ref 70–99)
GLUCOSE UR QL: NEGATIVE MG/DL — SIGNIFICANT CHANGE UP
HCT VFR BLD CALC: 43 % — SIGNIFICANT CHANGE UP (ref 34.5–45)
HGB BLD-MCNC: 15 G/DL — SIGNIFICANT CHANGE UP (ref 11.5–15.5)
IMM GRANULOCYTES # BLD AUTO: 0.03 K/UL — SIGNIFICANT CHANGE UP (ref 0–0.07)
IMM GRANULOCYTES NFR BLD AUTO: 0.3 % — SIGNIFICANT CHANGE UP (ref 0–0.9)
KETONES UR QL: ABNORMAL MG/DL
LEUKOCYTE ESTERASE UR-ACNC: ABNORMAL
LYMPHOCYTES # BLD AUTO: 1.2 K/UL — SIGNIFICANT CHANGE UP (ref 1–3.3)
LYMPHOCYTES NFR BLD AUTO: 13.6 % — SIGNIFICANT CHANGE UP (ref 13–44)
MCHC RBC-ENTMCNC: 29.5 PG — SIGNIFICANT CHANGE UP (ref 27–34)
MCHC RBC-ENTMCNC: 34.9 G/DL — SIGNIFICANT CHANGE UP (ref 32–36)
MCV RBC AUTO: 84.6 FL — SIGNIFICANT CHANGE UP (ref 80–100)
METHADONE UR-MCNC: NEGATIVE — SIGNIFICANT CHANGE UP
MONOCYTES # BLD AUTO: 0.41 K/UL — SIGNIFICANT CHANGE UP (ref 0–0.9)
MONOCYTES NFR BLD AUTO: 4.6 % — SIGNIFICANT CHANGE UP (ref 2–14)
NEUTROPHILS # BLD AUTO: 7.17 K/UL — SIGNIFICANT CHANGE UP (ref 1.8–7.4)
NEUTROPHILS NFR BLD AUTO: 81.4 % — HIGH (ref 43–77)
NITRITE UR-MCNC: NEGATIVE — SIGNIFICANT CHANGE UP
NRBC # BLD AUTO: 0 K/UL — SIGNIFICANT CHANGE UP (ref 0–0)
NRBC # FLD: 0 K/UL — SIGNIFICANT CHANGE UP (ref 0–0)
NRBC BLD AUTO-RTO: 0 /100 WBCS — SIGNIFICANT CHANGE UP (ref 0–0)
OPIATES UR-MCNC: NEGATIVE — SIGNIFICANT CHANGE UP
PCP SPEC-MCNC: SIGNIFICANT CHANGE UP
PCP UR-MCNC: NEGATIVE — SIGNIFICANT CHANGE UP
PH UR: 5.5 — SIGNIFICANT CHANGE UP (ref 5–8)
PLATELET # BLD AUTO: 193 K/UL — SIGNIFICANT CHANGE UP (ref 150–400)
PMV BLD: 9.7 FL — SIGNIFICANT CHANGE UP (ref 7–13)
POTASSIUM SERPL-MCNC: 3 MMOL/L — LOW (ref 3.5–5.3)
POTASSIUM SERPL-SCNC: 3 MMOL/L — LOW (ref 3.5–5.3)
PROT SERPL-MCNC: 7.4 GM/DL — SIGNIFICANT CHANGE UP (ref 6–8.3)
PROT UR-MCNC: 30 MG/DL
RBC # BLD: 5.08 M/UL — SIGNIFICANT CHANGE UP (ref 3.8–5.2)
RBC # FLD: 13.9 % — SIGNIFICANT CHANGE UP (ref 10.3–14.5)
RBC CASTS # UR COMP ASSIST: 2 /HPF — SIGNIFICANT CHANGE UP (ref 0–4)
RSV RNA NPH QL NAA+NON-PROBE: SIGNIFICANT CHANGE UP
SALICYLATES SERPL-MCNC: <1.7 MG/DL — LOW (ref 2.8–20)
SARS-COV-2 RNA SPEC QL NAA+PROBE: SIGNIFICANT CHANGE UP
SODIUM SERPL-SCNC: 141 MMOL/L — SIGNIFICANT CHANGE UP (ref 135–145)
SOURCE RESPIRATORY: SIGNIFICANT CHANGE UP
SP GR SPEC: 1.03 — SIGNIFICANT CHANGE UP (ref 1–1.03)
SQUAMOUS # UR AUTO: 6 /HPF — HIGH (ref 0–5)
THC UR QL: NEGATIVE — SIGNIFICANT CHANGE UP
UROBILINOGEN FLD QL: 1 MG/DL — SIGNIFICANT CHANGE UP (ref 0.2–1)
WBC # BLD: 8.82 K/UL — SIGNIFICANT CHANGE UP (ref 3.8–10.5)
WBC # FLD AUTO: 8.82 K/UL — SIGNIFICANT CHANGE UP (ref 3.8–10.5)
WBC UR QL: 4 /HPF — SIGNIFICANT CHANGE UP (ref 0–5)

## 2025-05-27 PROCEDURE — 36415 COLL VENOUS BLD VENIPUNCTURE: CPT

## 2025-05-27 PROCEDURE — 0241U: CPT

## 2025-05-27 PROCEDURE — 80307 DRUG TEST PRSMV CHEM ANLYZR: CPT

## 2025-05-27 PROCEDURE — 80053 COMPREHEN METABOLIC PANEL: CPT

## 2025-05-27 PROCEDURE — 99285 EMERGENCY DEPT VISIT HI MDM: CPT

## 2025-05-27 PROCEDURE — 99285 EMERGENCY DEPT VISIT HI MDM: CPT | Mod: 25

## 2025-05-27 PROCEDURE — 93005 ELECTROCARDIOGRAM TRACING: CPT

## 2025-05-27 PROCEDURE — 81001 URINALYSIS AUTO W/SCOPE: CPT

## 2025-05-27 PROCEDURE — 93010 ELECTROCARDIOGRAM REPORT: CPT

## 2025-05-27 PROCEDURE — 96374 THER/PROPH/DIAG INJ IV PUSH: CPT

## 2025-05-27 PROCEDURE — 85025 COMPLETE CBC W/AUTO DIFF WBC: CPT

## 2025-05-27 PROCEDURE — 96372 THER/PROPH/DIAG INJ SC/IM: CPT | Mod: XU

## 2025-05-27 RX ORDER — LOSARTAN POTASSIUM AND HYDROCHLOROTHIAZIDE 12.5; 5 MG/1; MG/1
1 TABLET ORAL
Refills: 0 | DISCHARGE

## 2025-05-27 RX ORDER — LORAZEPAM 4 MG/ML
1 VIAL (ML) INJECTION ONCE
Refills: 0 | Status: DISCONTINUED | OUTPATIENT
Start: 2025-05-27 | End: 2025-05-27

## 2025-05-27 RX ORDER — CLONAZEPAM 0.5 MG/1
1 TABLET ORAL ONCE
Refills: 0 | Status: DISCONTINUED | OUTPATIENT
Start: 2025-05-27 | End: 2025-05-27

## 2025-05-27 RX ORDER — PAROXETINE HYDROCHLORIDE 20 MG/1
1 TABLET, FILM COATED ORAL
Refills: 0 | DISCHARGE

## 2025-05-27 RX ORDER — ARIPIPRAZOLE 2 MG/1
1 TABLET ORAL
Refills: 0 | DISCHARGE

## 2025-05-27 RX ORDER — CARVEDILOL 3.12 MG/1
1 TABLET, FILM COATED ORAL
Refills: 0 | DISCHARGE

## 2025-05-27 RX ORDER — CLONAZEPAM 0.5 MG/1
1 TABLET ORAL
Refills: 0 | DISCHARGE

## 2025-05-27 RX ORDER — FLUOXETINE HYDROCHLORIDE 20 MG/1
1 CAPSULE ORAL
Refills: 0 | DISCHARGE

## 2025-05-27 RX ORDER — HALOPERIDOL 10 MG/1
5 TABLET ORAL ONCE
Refills: 0 | Status: COMPLETED | OUTPATIENT
Start: 2025-05-27 | End: 2025-05-27

## 2025-05-27 RX ORDER — CLONAZEPAM 0.5 MG/1
0.5 TABLET ORAL
Refills: 0 | DISCHARGE

## 2025-05-27 RX ADMIN — Medication 20 MILLIEQUIVALENT(S): at 13:41

## 2025-05-27 RX ADMIN — Medication 1 MILLIGRAM(S): at 10:10

## 2025-05-27 RX ADMIN — CLONAZEPAM 1 MILLIGRAM(S): 0.5 TABLET ORAL at 19:33

## 2025-05-27 RX ADMIN — HALOPERIDOL 5 MILLIGRAM(S): 10 TABLET ORAL at 10:10

## 2025-05-27 RX ADMIN — Medication 100 MILLIEQUIVALENT(S): at 13:41
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
## 2025-05-27 NOTE — ED ADULT NURSE REASSESSMENT NOTE - NS ED NURSE REASSESS COMMENT FT1
pt allowed to take a shower as per md orders , 1:1 standing with pt, pt and  updated on plan of care awaiting admission bed at [facility] as per family request, will cont to monitor

## 2025-05-27 NOTE — ED BEHAVIORAL HEALTH ASSESSMENT NOTE - NSSUICPROTFACT_PSY_ALL_CORE
Pharmacy faxed in a request for prior authorization on:    Medication: Meloxicam 5 mg  Dosage: take 1 tablet by mouth daily  Quantity requested:  30  Pharmacy for prescription has been selected.    Prior authorization request has been initiated and sent for completion.     [facility] [place]    Routed to [facility] Prior Auth       Responsibility to children, family, or others/Supportive social network of family or friends/Positive therapeutic relationships

## 2025-05-27 NOTE — ED PROVIDER NOTE - PHYSICAL EXAMINATION
Constitutional: awake  Eyes: EOMI, pupils equal  Head: Normocephalic atraumatic  Mouth: no airway obstruction  Cardiac: regular rate   Resp: Lungs CTAB  GI: Abd s/nt/nd  Neuro: CN2-12 intact  Skin: No rashes, no cuts on body
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
## 2025-05-27 NOTE — ED ADULT NURSE REASSESSMENT NOTE - NS ED NURSE REASSESS COMMENT FT1
Pt care resumed from SHARRON ZHANG Pt awake in stretcher and requesting anxiety meds. MD Arias aware. Order for 1mg Klonopin PO in.

## 2025-05-27 NOTE — ED ADULT NURSE NOTE - NSFALLHARMRISKINTERV_ED_ALL_ED
Assistance OOB with selected safe patient handling equipment if applicable/Assistance with ambulation/Communicate risk of Fall with Harm to all staff, patient, and family/Monitor gait and stability/Monitor for mental status changes and reorient to person, place, and time, as needed/Move patient closer to nursing station/within visual sight of ED staff/Provide visual cue: red socks, yellow wristband, yellow gown, etc/Reinforce activity limits and safety measures with patient and family/Toileting schedule using arm’s reach rule for commode and bathroom/Use of alarms - bed, stretcher, chair and/or video monitoring/Bed in lowest position, wheels locked, appropriate side rails in place/Call bell, personal items and telephone in reach/Instruct patient to call for assistance before getting out of bed/chair/stretcher/Non-slip footwear applied when patient is off stretcher/[place] to call system/Physically safe environment - no spills, clutter or unnecessary equipment/Purposeful Proactive Rounding/Room/bathroom lighting operational, light cord in reach
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
## 2025-05-27 NOTE — PHARMACOTHERAPY INTERVENTION NOTE - COMMENTS
Med history complete, patient unable to provide medication history. Reviewed medications and allergies with doctor first med profile and [facility] pharmacy records [phone]

## 2025-05-27 NOTE — ED PROVIDER NOTE - CLINICAL SUMMARY MEDICAL DECISION MAKING FREE TEXT BOX
63 yo pt presents to ED for agitation and trying to stab herself.    Plan psych consult. 63 yo pt presents to ED for agitation and trying to stab herself.    Plan psych consult.      Vargas DO: I received s/o from Dr. Livingston at 7am- patient to be transferred to [facility] for psych admission.
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
## 2025-05-28 VITALS
HEART RATE: 76 BPM | SYSTOLIC BLOOD PRESSURE: 162 MMHG | TEMPERATURE: 98 F | RESPIRATION RATE: 16 BRPM | DIASTOLIC BLOOD PRESSURE: 95 MMHG | OXYGEN SATURATION: 96 %

## 2025-05-28 PROCEDURE — 99283 EMERGENCY DEPT VISIT LOW MDM: CPT

## 2025-05-28 RX ORDER — CLONAZEPAM 0.5 MG/1
1 TABLET ORAL ONCE
Refills: 0 | Status: DISCONTINUED | OUTPATIENT
Start: 2025-05-28 | End: 2025-05-28

## 2025-05-28 RX ADMIN — CLONAZEPAM 1 MILLIGRAM(S): 0.5 TABLET ORAL at 10:18

## 2025-05-28 NOTE — ED BEHAVIORAL HEALTH PROGRESS NOTE - ATTENDING COMMENTS
I saw the patient and coordinated care with social work, ED team.  Had to return to emergency room in few occasions discussed the plan with the patient and her daughter.    Patient was admitted on involuntary status 2 PC due to attempted suicide.  Family asked the patient to be transferred to the [facility] SW  tried to obtain the bed but they did not have any.  Further our social workers called other hospitals and they found bed only in [facility].    Family expressed dissatisfaction because they did not hear good news about that hospital however considering the fact that there are no any other beds available and patient had to leave emergency room as she is involuntary status patient was transferred.    I agree with plan assessment.

## 2025-05-28 NOTE — ED ADULT NURSE REASSESSMENT NOTE - NS ED NURSE REASSESS COMMENT FT1
Received pt from Huyen Alba RN. Pt returning from bathroom, walking steadily. Pt medicated as per MD orders. Awaiting bed assignment. No complaints at this time. Constant observation remains in place. Comfort and safety maintained.
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
## 2025-05-28 NOTE — ED ADULT NURSE REASSESSMENT NOTE - NS ED NURSE REASSESS COMMENT FT1
PIV removed. Pt calm and cooperative. No complaints at this time.  at bedside. Constant observation remains in place. Comfort and safety maintained.

## 2025-05-28 NOTE — ED BEHAVIORAL HEALTH PROGRESS NOTE - NSRISKVIOL_PSY_A_CORE
Called patient, no answer, left message.  
Last seen in office 2/10/2022 no follow up appointment scheduled at this time  Last refill given on hydrochlorothiazide 2/10/2022  Last labs done 2/10/2022    Patient is due for 1 month follow up appointment with Dr Sewell. Please call patient to schedule appointment.   
Low

## 2025-05-28 NOTE — ED ADULT NURSE REASSESSMENT NOTE - NS ED NURSE REASSESS COMMENT FT1
Spoke with Renée from [facility] and provided all information and answered all questions related to transfer of patient from [facility] to [facility].

## 2025-05-28 NOTE — ED BEHAVIORAL HEALTH PROGRESS NOTE - CASE SUMMARY/FORMULATION (CLEARLY DOCUMENT RATIONALE FOR DISPOSITION CHANGE)
No change in plan at this time patient is to be admitted 2PC and transferred to another hospital at patients/ families request.

## 2025-06-16 ENCOUNTER — RX RENEWAL (OUTPATIENT)
Age: 65
End: 2025-06-16

## 2025-06-16 ENCOUNTER — OUTPATIENT (OUTPATIENT)
Dept: OUTPATIENT SERVICES | Facility: HOSPITAL | Age: 65
LOS: 1 days | Discharge: ANOTHER HOSPITAL | End: 2025-06-16
Payer: MEDICARE

## 2025-06-16 PROCEDURE — 90839 PSYTX CRISIS INITIAL 60 MIN: CPT

## 2025-06-20 ENCOUNTER — EMERGENCY (EMERGENCY)
Facility: HOSPITAL | Age: 65
LOS: 1 days | End: 2025-06-20
Attending: EMERGENCY MEDICINE | Admitting: STUDENT IN AN ORGANIZED HEALTH CARE EDUCATION/TRAINING PROGRAM
Payer: MEDICARE

## 2025-06-20 VITALS
SYSTOLIC BLOOD PRESSURE: 164 MMHG | OXYGEN SATURATION: 97 % | HEART RATE: 78 BPM | DIASTOLIC BLOOD PRESSURE: 90 MMHG | WEIGHT: 139.99 LBS | RESPIRATION RATE: 17 BRPM | HEIGHT: 59 IN | TEMPERATURE: 98 F

## 2025-06-20 VITALS
DIASTOLIC BLOOD PRESSURE: 90 MMHG | RESPIRATION RATE: 16 BRPM | TEMPERATURE: 98 F | HEART RATE: 71 BPM | SYSTOLIC BLOOD PRESSURE: 176 MMHG | OXYGEN SATURATION: 100 %

## 2025-06-20 LAB
ALBUMIN SERPL ELPH-MCNC: 4 G/DL — SIGNIFICANT CHANGE UP (ref 3.3–5)
ALP SERPL-CCNC: 53 U/L — SIGNIFICANT CHANGE UP (ref 40–120)
ALT FLD-CCNC: 16 U/L — SIGNIFICANT CHANGE UP (ref 4–33)
ANION GAP SERPL CALC-SCNC: 14 MMOL/L — SIGNIFICANT CHANGE UP (ref 7–14)
APPEARANCE UR: CLEAR — SIGNIFICANT CHANGE UP
APTT BLD: 29.5 SEC — SIGNIFICANT CHANGE UP (ref 26.1–36.8)
AST SERPL-CCNC: 17 U/L — SIGNIFICANT CHANGE UP (ref 4–32)
BASOPHILS # BLD AUTO: 0.02 K/UL — SIGNIFICANT CHANGE UP (ref 0–0.2)
BASOPHILS NFR BLD AUTO: 0.2 % — SIGNIFICANT CHANGE UP (ref 0–2)
BILIRUB SERPL-MCNC: 0.4 MG/DL — SIGNIFICANT CHANGE UP (ref 0.2–1.2)
BILIRUB UR-MCNC: NEGATIVE — SIGNIFICANT CHANGE UP
BLOOD GAS VENOUS COMPREHENSIVE RESULT: SIGNIFICANT CHANGE UP
BUN SERPL-MCNC: 14 MG/DL — SIGNIFICANT CHANGE UP (ref 7–23)
CALCIUM SERPL-MCNC: 9.8 MG/DL — SIGNIFICANT CHANGE UP (ref 8.4–10.5)
CHLORIDE SERPL-SCNC: 104 MMOL/L — SIGNIFICANT CHANGE UP (ref 98–107)
CO2 SERPL-SCNC: 21 MMOL/L — LOW (ref 22–31)
COLOR SPEC: SIGNIFICANT CHANGE UP
CREAT SERPL-MCNC: 0.74 MG/DL — SIGNIFICANT CHANGE UP (ref 0.5–1.3)
DIFF PNL FLD: NEGATIVE — SIGNIFICANT CHANGE UP
EGFR: 89 ML/MIN/1.73M2 — SIGNIFICANT CHANGE UP
EGFR: 89 ML/MIN/1.73M2 — SIGNIFICANT CHANGE UP
EOSINOPHIL # BLD AUTO: 0 K/UL — SIGNIFICANT CHANGE UP (ref 0–0.5)
EOSINOPHIL NFR BLD AUTO: 0 % — SIGNIFICANT CHANGE UP (ref 0–6)
FLUAV AG NPH QL: SIGNIFICANT CHANGE UP
FLUBV AG NPH QL: SIGNIFICANT CHANGE UP
GLUCOSE SERPL-MCNC: 85 MG/DL — SIGNIFICANT CHANGE UP (ref 70–99)
GLUCOSE UR QL: NEGATIVE MG/DL — SIGNIFICANT CHANGE UP
HCT VFR BLD CALC: 37.2 % — SIGNIFICANT CHANGE UP (ref 34.5–45)
HGB BLD-MCNC: 12.9 G/DL — SIGNIFICANT CHANGE UP (ref 11.5–15.5)
IMM GRANULOCYTES # BLD AUTO: 0.02 K/UL — SIGNIFICANT CHANGE UP (ref 0–0.07)
IMM GRANULOCYTES NFR BLD AUTO: 0.2 % — SIGNIFICANT CHANGE UP (ref 0–0.9)
INR BLD: <0.9 RATIO — SIGNIFICANT CHANGE UP (ref 0.85–1.16)
KETONES UR QL: NEGATIVE MG/DL — SIGNIFICANT CHANGE UP
LEUKOCYTE ESTERASE UR-ACNC: NEGATIVE — SIGNIFICANT CHANGE UP
LIDOCAIN IGE QN: 25 U/L — SIGNIFICANT CHANGE UP (ref 7–60)
LYMPHOCYTES # BLD AUTO: 1.03 K/UL — SIGNIFICANT CHANGE UP (ref 1–3.3)
LYMPHOCYTES NFR BLD AUTO: 12.4 % — LOW (ref 13–44)
MCHC RBC-ENTMCNC: 29.9 PG — SIGNIFICANT CHANGE UP (ref 27–34)
MCHC RBC-ENTMCNC: 34.7 G/DL — SIGNIFICANT CHANGE UP (ref 32–36)
MCV RBC AUTO: 86.1 FL — SIGNIFICANT CHANGE UP (ref 80–100)
MONOCYTES # BLD AUTO: 0.46 K/UL — SIGNIFICANT CHANGE UP (ref 0–0.9)
MONOCYTES NFR BLD AUTO: 5.5 % — SIGNIFICANT CHANGE UP (ref 2–14)
NEUTROPHILS # BLD AUTO: 6.79 K/UL — SIGNIFICANT CHANGE UP (ref 1.8–7.4)
NEUTROPHILS NFR BLD AUTO: 81.7 % — HIGH (ref 43–77)
NITRITE UR-MCNC: NEGATIVE — SIGNIFICANT CHANGE UP
NRBC # BLD AUTO: 0 K/UL — SIGNIFICANT CHANGE UP (ref 0–0)
NRBC # FLD: 0 K/UL — SIGNIFICANT CHANGE UP (ref 0–0)
NRBC BLD AUTO-RTO: 0 /100 WBCS — SIGNIFICANT CHANGE UP (ref 0–0)
PH UR: 5.5 — SIGNIFICANT CHANGE UP (ref 5–8)
PLATELET # BLD AUTO: 172 K/UL — SIGNIFICANT CHANGE UP (ref 150–400)
PMV BLD: 9.6 FL — SIGNIFICANT CHANGE UP (ref 7–13)
POTASSIUM SERPL-MCNC: 3.5 MMOL/L — SIGNIFICANT CHANGE UP (ref 3.5–5.3)
POTASSIUM SERPL-SCNC: 3.5 MMOL/L — SIGNIFICANT CHANGE UP (ref 3.5–5.3)
PROT SERPL-MCNC: 7.1 G/DL — SIGNIFICANT CHANGE UP (ref 6–8.3)
PROT UR-MCNC: SIGNIFICANT CHANGE UP MG/DL
PROTHROM AB SERPL-ACNC: 10.6 SEC — SIGNIFICANT CHANGE UP (ref 9.9–13.4)
RBC # BLD: 4.32 M/UL — SIGNIFICANT CHANGE UP (ref 3.8–5.2)
RBC # FLD: 14 % — SIGNIFICANT CHANGE UP (ref 10.3–14.5)
RSV RNA NPH QL NAA+NON-PROBE: SIGNIFICANT CHANGE UP
SARS-COV-2 RNA SPEC QL NAA+PROBE: SIGNIFICANT CHANGE UP
SODIUM SERPL-SCNC: 139 MMOL/L — SIGNIFICANT CHANGE UP (ref 135–145)
SOURCE RESPIRATORY: SIGNIFICANT CHANGE UP
SP GR SPEC: 1.03 — SIGNIFICANT CHANGE UP (ref 1–1.03)
UROBILINOGEN FLD QL: 0.2 MG/DL — SIGNIFICANT CHANGE UP (ref 0.2–1)
WBC # BLD: 8.32 K/UL — SIGNIFICANT CHANGE UP (ref 3.8–10.5)
WBC # FLD AUTO: 8.32 K/UL — SIGNIFICANT CHANGE UP (ref 3.8–10.5)

## 2025-06-20 PROCEDURE — 74177 CT ABD & PELVIS W/CONTRAST: CPT | Mod: 26

## 2025-06-20 PROCEDURE — 99285 EMERGENCY DEPT VISIT HI MDM: CPT

## 2025-06-20 RX ORDER — ACETAMINOPHEN 500 MG/5ML
975 LIQUID (ML) ORAL ONCE
Refills: 0 | Status: COMPLETED | OUTPATIENT
Start: 2025-06-20 | End: 2025-06-20

## 2025-06-20 RX ADMIN — Medication 975 MILLIGRAM(S): at 13:38

## 2025-06-20 RX ADMIN — Medication 20 MILLIGRAM(S): at 13:38

## 2025-06-20 RX ADMIN — Medication 1000 MILLILITER(S): at 13:37

## 2025-06-20 NOTE — ED PROVIDER NOTE - PATIENT PORTAL LINK FT
You can access the FollowMyHealth Patient Portal offered by [facility] by registering at the following website: [email or web address]. By joining [facility]’s FollowMyHealth portal, you will also be able to view your health information using other applications (apps) compatible with our system.

## 2025-06-20 NOTE — ED ADULT NURSE NOTE - CHIEF COMPLAINT QUOTE
Pt arrives with . Pt was seen at [facility] crisis center 3 days ago to have voluntary admission, but patient endorsing RLQ abdominal pain x2 years and was advised to come to the ED to be medically cleared before patient can be admitted voluntarily to [facility]. Pt endorsing nausea. Pt denies SI/HI, auditory or visual hallucinations. Hx of HTN, depression and anxiety. Compliant with medications. Pt calm and cooperative.

## 2025-06-20 NOTE — ED PROVIDER NOTE - ATTENDING CONTRIBUTION TO CARE
Pt was seen and evaluated by me. Pt is a 63 y/o female with PMHx of HTN, Anxiety, and Depression who presented to the ED for RLQ abd pain X 2 years. Pt states over the past 2 years having RLQ abd pain. Pt has had previous imaging and previous hernia repair. Pt was told she had fibroids and a small ovarian cyst. Pt presented today after evaluation at [facility] for anxiety for medical clearance.  VITALS: Vitals have been reviewed.  GEN APPEARANCE: Alert and cooperative, non-toxic appearing and in NAD  HEAD: Atraumatic, normocephalic.   EYES: PERRL,.   EARS: Gross hearing intact.   NOSE: No nasal discharge.   THROAT: MMM. Oral cavity and pharynx normal.   CV: RRR, S1S2, no c/r/m/g. No cyanosis or pallor.   LUNGS: CTAB. No wheezing. No rales. No rhonchi. No diminished breath sounds.   ABDOMEN: Soft, tender to RLQ.   MSK/EXT: Spine appears normal, no spine point tenderness.   NEURO: Alert, follows commands. Speech normal. Sensation and motor normal x4 extremities.   SKIN: Normal color for race, warm, dry and intact. No evidence of rash.  63 y/o female with PMHx of HTN, Anxiety, and Depression who presented to the ED for RLQ abd pain X 2 years  Concern for Appendicitis, Colitis, UTI, Ovarian Cyst, Fibroid  Will get labs, UA, and CT. Will give IVF and analgesia.

## 2025-06-20 NOTE — ED ADULT NURSE NOTE - OBJECTIVE STATEMENT
pt received to [facility], chaitanya&ox4, ambulatory at baseline, c/o abdominal pain radiating to groin associated with diarrhea. denies urinary complaints, fevers, chills, n/v. pt sent by [facility] for medical clearance for voluntary admission. pt denies SI, HI, Hallucinations, drug or alcohol use. pt in no acute distress, respirations even and nonlabored on room air. 20G IV placed in R arm, labs drawn and sent, medicated as ordered. comfort and safety maintained.  at bedside. pending CT

## 2025-06-20 NOTE — ED PROVIDER NOTE - NSFOLLOWUPINSTRUCTIONS_ED_ALL_ED_FT
Rest, drink plenty of fluids.  Advance activity as tolerated.  Continue all previously prescribed medications as directed.  Follow up with your primary care physician in 48-72 hours- bring copies of your results.  Return to the ER for worsening or persistent symptoms, and/or ANY NEW OR CONCERNING SYMPTOMS. If you have issues obtaining follow up, please call: [phone] to obtain a doctor or specialist who takes your insurance in your area.  You may call [phone] to make an appointment with the internal medicine clinic.    Crisis Center at [facility]    Location entrance and phone number:  [address] [place] [address] and [address] [place] [address]  [phone]    Walk-in hours:  Monday through Friday, 9am to 3pm  Earlier arrivals (before 2pm) would be appreciated  Questions:  Visit our website at [email or web address]  [email or web address]    Anxiety    WHAT YOU NEED TO KNOW:    Anxiety is a condition that causes you to feel extremely worried or nervous. The feelings are so strong that they can cause problems with your daily activities or sleep. Anxiety may be triggered by something you fear, or it may happen without a cause. Family or work stress, smoking, caffeine, and alcohol can increase your risk for anxiety. Certain medicines or health conditions can also increase your risk. Anxiety can become a long-term condition if it is not managed or treated.    DISCHARGE INSTRUCTIONS:    Call your local emergency number (911 in the [place]) if:    You have chest pain, tightness, or heaviness that may spread to your shoulders, arms, jaw, neck, or back.    You feel like hurting yourself or someone else.  Call your doctor if:    Your symptoms get worse or do not get better with treatment.    Your anxiety keeps you from doing your regular daily activities.    You have new symptoms since your last visit.    You have questions or concerns about your condition or care.  Medicines:    Medicines may be given to help you feel more calm and relaxed, and decrease your symptoms.    Take your medicine as directed. Contact your healthcare provider if you think your medicine is not helping or if you have side effects. Tell your provider if you are allergic to any medicine. Keep a list of the medicines, vitamins, and herbs you take. Include the amounts, and when and why you take them. Bring the list or the pill bottles to follow-up visits. Carry your medicine list with you in case of an emergency.  Manage anxiety:    Talk to someone about your anxiety. Your healthcare provider may suggest counseling. Cognitive behavioral therapy can help you understand and change how you react to events that trigger your symptoms. You might feel more comfortable talking with a friend or family member about your anxiety. Choose someone you know will be supportive and encouraging.    Find ways to relax. Activities such as exercise, meditation, or listening to music can help you relax. Spend time with friends, or do things you enjoy.    Practice deep breathing. Deep breathing can help you relax when you feel anxious. Focus on taking slow, deep breaths several times a day, or during an anxiety attack. Breathe in through your nose and out through your mouth.    Create a regular sleep routine. Regular sleep can help you feel calmer during the day. Go to sleep and wake up at the same times every day. Do not watch television or use the computer right before bed. Your room should be comfortable, dark, and quiet.    Eat a variety of healthy foods. Healthy foods include fruits, vegetables, low-fat dairy products, lean meats, fish, whole-grain breads, and cooked beans. Healthy foods can help you feel less anxious and have more energy.  Healthy Foods      Exercise regularly. Exercise can increase your energy level. Exercise may also lift your mood and help you sleep better. Your healthcare provider can help you create an exercise plan.   Family Walking for Exercise      Do not smoke. Nicotine and other chemicals in cigarettes and cigars can increase anxiety. Ask your healthcare provider for information if you currently smoke and need help to quit. E-cigarettes or smokeless tobacco still contain nicotine. Talk to your healthcare provider before you use these products.    Do not have caffeine. Caffeine can make your symptoms worse. Do not have foods or drinks that are meant to increase your energy level.    Limit or do not drink alcohol. Ask your healthcare provider if alcohol is safe for you. You may not be able to drink alcohol if you take certain anxiety or depression medicines. Limit alcohol to 1 drink per day if you are a woman. Limit alcohol to 2 drinks per day if you are a man. A drink of alcohol is 12 ounces of beer, 5 ounces of wine, or 1½ ounces of liquor.    Do not use drugs. Drugs can make your anxiety worse. It can also make anxiety hard to manage. Talk to your healthcare provider if you use drugs and want help to quit.  Follow up with your doctor within 2 weeks or as directed: Write down your questions so you remember to ask them during your visits.

## 2025-06-20 NOTE — ED PROVIDER NOTE - OBJECTIVE STATEMENT
Patient is a 64-year-old female history of HTN, anxiety, depression presenting with concern of worsening chronic abdominal pain. Patient has had RLQ abdominal pain for 2 years. Pain worsened overnight and is localized to the RLQ with radiation to the groin. Pain is constant and describes as a "stabbing sensation." Endorses nausea, lightheadedness, diarrhea, and chills. Denies fever, chest pain, back pain, shortness of breath, vomiting,  symptoms, vaginal discharge. OTC pain medication Tylenol and Ibuprofen with no relief. Patient had unremarkable CTAP and TVUS to evaluate pain in March 2025. Patient is needing medical clearance prior to voluntary admission to [facility].

## 2025-06-20 NOTE — ED PROVIDER NOTE - CLINICAL SUMMARY MEDICAL DECISION MAKING FREE TEXT BOX
Patient has appointment 4/22/22.      Received request via: Patient    Was the patient seen in the last year in this department? Yes    Does the patient have an active prescription (recently filled or refills available) for medication(s) requested? No   64-year-old female hx of HTN, anxiety, depression presenting with acute worsening of chronic RLQ abdominal pain. Pain located in RLQ radiating to groin. Worsened last night. Had recent CTAP and TVUS in March 2025 which was unremarkable. Endorses nausea, diarrhea, chills, and lightheadedness. Vital signs reviewed and stable. Physical exam notable for right-sided and epigastric tenderness to palpation; no rebound tenderness or guarding. Differential includes appendicitis vs diverticulitis vs UTI vs nephro/ureterolithiasis vs UTI. Will obtain CBC, CMP, coags, lipase, UA, CTAP. Famotidine and Tylenol for symptom control and re-assess.

## 2025-06-20 NOTE — ED PROVIDER NOTE - PROGRESS NOTE DETAILS
Omar Wright DO (ED Attending):    Patient was signed out to me pending CT of the abdomen pelvis.  She presented for chronic right lower quadrant abdominal pain.  Vital signs are stable.  On my assessment she had minimal tenderness in right lower quadrant without rebound or guarding.  Blood work reviewed no anemia or leukocytosis.  Normal renal function.  No electro abnormalities.  CT abdomen pelvis negative.  Viral swabs are negative.    Patient is having chronic anxiety.  Denies any SI, HI, auditory visualizations, drug abuse.    Patient is not interested in voluntary psych admission for anxiety.  Will discharge with outpatient follow-up to the crisis center.  Will give outpatient follow-up to GI for second opinion as patient states GI doctor told her to take MiraLAX for abdominal pain.  And she has not been taking it she is requesting second opinion.    Regarding her anxiety she follows with NP Aranda and patient is currently on Xanax, Abilify and fluoxetine.  She endorses compliance.    At this time patient is stable for discharge.

## 2025-06-20 NOTE — ED ADULT TRIAGE NOTE - NS ED NURSE BANDS TYPE
**Name band;

## 2025-06-20 NOTE — ED ADULT TRIAGE NOTE - CHIEF COMPLAINT QUOTE
Pt arrives with . Pt was seen at [facility] crisis center 3 days ago to have voluntary admission, but patient endorsing RLQ abdominal pain x2 years and was advised to come to the ED to be medically cleared before patient can be admitted voluntarily to [facility]. Pt endorsing nausea. Pt denies SI/HI, auditory or visual hallucinations. Hx of HTN, depression and anxiety. Compliant with medications. Pt calm and cooperative.

## 2025-06-20 NOTE — ED PROVIDER NOTE - GASTROINTESTINAL, MLM
Abdomen soft, tenderness to palpation in RLQ, RUQ, epigastric area, no guarding or rebound tenderness

## 2025-06-20 NOTE — ED POST DISCHARGE NOTE - ADDITIONAL DOCUMENTATION
I received a phone call from the daughter.  She stated that the patient was now suicidal.  She inquired further on why the patient was discharged.  I told her the reason for discharge is because at that time patient adamantly denied SI, HI with both the patient and  at bedside.  Additionally the patient denied SI, HI to Dr. Clifford.  The patient was endorsing anxiety.  Initially she was requesting voluntary admission for anxiety however on my assessment she denied this.  Additionally we were told that there are no female beds at [facility], which both the patient and  at bedside were made aware.  Her  had asked name how we can involuntarily admit the patient.  I told him that the patient is denying any SI, HI, alternations or physical danger to self or others she would not meet inpatient psychiatric criteria.  He then asked me "what if I call 911.  I told him as above. I received a phone call from the daughter.  She stated that the patient was now suicidal.  She inquired further on why the patient was discharged.  I told her the reason for discharge is because at that time patient adamantly denied SI, HI with both the patient and  at bedside.  Additionally the patient denied SI, HI to Dr. Clifford.  The patient was endorsing anxiety.  Initially she was requesting voluntary admission for anxiety however on my assessment she denied this.  Additionally we were told that there are no female beds at [facility], which both the patient and  at bedside were made aware.  Her  had asked name how we can involuntarily admit the patient.  I told him that the patient is denying any SI, HI, alternations or physical danger to self or others she would not meet inpatient psychiatric criteria.  He then asked me "what if I call 911.  I told him as above. at 2003 I contact 911 for a welfare check.

## 2025-06-25 NOTE — ED ADULT NURSE NOTE - NSFALLUNIVINTERV_ED_ALL_ED
Kesha aBbin is here for GYN exam    Concerns :none    Last [facility] Women Exam 08/19/2024  Last Pap: 08/19/2024 - WNL - Next due in 2027  History of abnormal Pap Smears: no  Last Mammogram:scheduled foe 11/10/2025  Last Colonoscopy:01/22/2025  Last Dexa: N/A  Gardasil series:N/A  Last TD/Tdap: 2021  Last Flu: 2024  Refills: yes  Contraception used: Seasonale    Chaperone needed:no    There are no preventive care reminders to display for this patient.    Patient is up to date, no discussion needed.        Patient would like communication of their results via:    [employer]    Patients Medical, Surgical, Family, Substance/Sexual and Obstetrics history were reviewed and/or updated.  Tobacco status verified. Latex status verified.            Bed/Stretcher in lowest position, wheels locked, appropriate side rails in place/Call bell, personal items and telephone in reach/Instruct patient to call for assistance before getting out of bed/chair/stretcher/Non-slip footwear applied when patient is off stretcher/[place] to call system/Physically safe environment - no spills, clutter or unnecessary equipment/Purposeful proactive rounding/Room/bathroom lighting operational, light cord in reach

## 2025-07-09 DIAGNOSIS — F33.9 MAJOR DEPRESSIVE DISORDER, RECURRENT, UNSPECIFIED: ICD-10-CM

## 2025-08-07 ENCOUNTER — INPATIENT (INPATIENT)
Facility: HOSPITAL | Age: 65
LOS: 10 days | Discharge: HOME | End: 2025-08-18
Attending: PSYCHIATRY & NEUROLOGY | Admitting: PSYCHIATRY & NEUROLOGY
Payer: MEDICARE

## 2025-08-07 VITALS
HEART RATE: 107 BPM | DIASTOLIC BLOOD PRESSURE: 104 MMHG | TEMPERATURE: 98 F | SYSTOLIC BLOOD PRESSURE: 177 MMHG | OXYGEN SATURATION: 98 % | HEIGHT: 59 IN | RESPIRATION RATE: 18 BRPM | WEIGHT: 145.06 LBS

## 2025-08-07 DIAGNOSIS — R45.851 SUICIDAL IDEATIONS: ICD-10-CM

## 2025-08-07 DIAGNOSIS — F33.2 MAJOR DEPRESSIVE DISORDER, RECURRENT SEVERE WITHOUT PSYCHOTIC FEATURES: ICD-10-CM

## 2025-08-07 LAB
ALBUMIN SERPL ELPH-MCNC: 4.5 G/DL — SIGNIFICANT CHANGE UP (ref 3.3–5)
ALP SERPL-CCNC: 58 U/L — SIGNIFICANT CHANGE UP (ref 40–120)
ALT FLD-CCNC: 18 U/L — SIGNIFICANT CHANGE UP (ref 4–33)
AMPHET UR-MCNC: NEGATIVE — SIGNIFICANT CHANGE UP
AMPHET UR-MCNC: NEGATIVE — SIGNIFICANT CHANGE UP
ANION GAP SERPL CALC-SCNC: 14 MMOL/L — SIGNIFICANT CHANGE UP (ref 7–14)
APAP SERPL-MCNC: <10 UG/ML — LOW (ref 15–25)
APPEARANCE UR: CLEAR — SIGNIFICANT CHANGE UP
AST SERPL-CCNC: 21 U/L — SIGNIFICANT CHANGE UP (ref 4–32)
BACTERIA # UR AUTO: ABNORMAL /HPF
BARBITURATES UR SCN-MCNC: NEGATIVE — SIGNIFICANT CHANGE UP
BARBITURATES UR SCN-MCNC: NEGATIVE — SIGNIFICANT CHANGE UP
BASOPHILS # BLD AUTO: 0.03 K/UL — SIGNIFICANT CHANGE UP (ref 0–0.2)
BASOPHILS NFR BLD AUTO: 0.3 % — SIGNIFICANT CHANGE UP (ref 0–2)
BENZODIAZ UR-MCNC: POSITIVE
BENZODIAZ UR-MCNC: POSITIVE
BILIRUB SERPL-MCNC: 0.5 MG/DL — SIGNIFICANT CHANGE UP (ref 0.2–1.2)
BILIRUB UR-MCNC: ABNORMAL
BUN SERPL-MCNC: 18 MG/DL — SIGNIFICANT CHANGE UP (ref 7–23)
CALCIUM SERPL-MCNC: 10.2 MG/DL — SIGNIFICANT CHANGE UP (ref 8.4–10.5)
CAST: 8 /LPF — HIGH (ref 0–4)
CHLORIDE SERPL-SCNC: 102 MMOL/L — SIGNIFICANT CHANGE UP (ref 98–107)
CO2 SERPL-SCNC: 24 MMOL/L — SIGNIFICANT CHANGE UP (ref 22–31)
COCAINE METAB.OTHER UR-MCNC: NEGATIVE — SIGNIFICANT CHANGE UP
COCAINE METAB.OTHER UR-MCNC: NEGATIVE — SIGNIFICANT CHANGE UP
COD CRY URNS QL: PRESENT
COLOR SPEC: SIGNIFICANT CHANGE UP
CREAT SERPL-MCNC: 0.84 MG/DL — SIGNIFICANT CHANGE UP (ref 0.5–1.3)
CREATININE URINE RESULT, DAU: 289 MG/DL — SIGNIFICANT CHANGE UP
CREATININE URINE RESULT, DAU: 291 MG/DL — SIGNIFICANT CHANGE UP
DIFF PNL FLD: NEGATIVE — SIGNIFICANT CHANGE UP
EGFR: 77 ML/MIN/1.73M2 — SIGNIFICANT CHANGE UP
EGFR: 77 ML/MIN/1.73M2 — SIGNIFICANT CHANGE UP
EOSINOPHIL # BLD AUTO: 0 K/UL — SIGNIFICANT CHANGE UP (ref 0–0.5)
EOSINOPHIL NFR BLD AUTO: 0 % — SIGNIFICANT CHANGE UP (ref 0–6)
EPI CELLS # UR: PRESENT
ETHANOL SERPL-MCNC: <10 MG/DL — SIGNIFICANT CHANGE UP
FENTANYL UR QL SCN: NEGATIVE — SIGNIFICANT CHANGE UP
FENTANYL UR QL SCN: NEGATIVE — SIGNIFICANT CHANGE UP
GLUCOSE SERPL-MCNC: 99 MG/DL — SIGNIFICANT CHANGE UP (ref 70–99)
GLUCOSE UR QL: NEGATIVE MG/DL — SIGNIFICANT CHANGE UP
HCT VFR BLD CALC: 42.6 % — SIGNIFICANT CHANGE UP (ref 34.5–45)
HGB BLD-MCNC: 14.5 G/DL — SIGNIFICANT CHANGE UP (ref 11.5–15.5)
HYALINE CASTS # UR AUTO: PRESENT
IMM GRANULOCYTES # BLD AUTO: 0.03 K/UL — SIGNIFICANT CHANGE UP (ref 0–0.07)
IMM GRANULOCYTES NFR BLD AUTO: 0.3 % — SIGNIFICANT CHANGE UP (ref 0–0.9)
KETONES UR QL: ABNORMAL MG/DL
LEUKOCYTE ESTERASE UR-ACNC: NEGATIVE — SIGNIFICANT CHANGE UP
LYMPHOCYTES # BLD AUTO: 1.34 K/UL — SIGNIFICANT CHANGE UP (ref 1–3.3)
LYMPHOCYTES NFR BLD AUTO: 12.7 % — LOW (ref 13–44)
MCHC RBC-ENTMCNC: 29.5 PG — SIGNIFICANT CHANGE UP (ref 27–34)
MCHC RBC-ENTMCNC: 34 G/DL — SIGNIFICANT CHANGE UP (ref 32–36)
MCV RBC AUTO: 86.6 FL — SIGNIFICANT CHANGE UP (ref 80–100)
METHADONE UR-MCNC: NEGATIVE — SIGNIFICANT CHANGE UP
METHADONE UR-MCNC: NEGATIVE — SIGNIFICANT CHANGE UP
MONOCYTES # BLD AUTO: 0.59 K/UL — SIGNIFICANT CHANGE UP (ref 0–0.9)
MONOCYTES NFR BLD AUTO: 5.6 % — SIGNIFICANT CHANGE UP (ref 2–14)
NEUTROPHILS # BLD AUTO: 8.57 K/UL — HIGH (ref 1.8–7.4)
NEUTROPHILS NFR BLD AUTO: 81.1 % — HIGH (ref 43–77)
NITRITE UR-MCNC: NEGATIVE — SIGNIFICANT CHANGE UP
NRBC # BLD AUTO: 0 K/UL — SIGNIFICANT CHANGE UP (ref 0–0)
NRBC # FLD: 0 K/UL — SIGNIFICANT CHANGE UP (ref 0–0)
NRBC BLD AUTO-RTO: 0 /100 WBCS — SIGNIFICANT CHANGE UP (ref 0–0)
OPIATES UR-MCNC: NEGATIVE — SIGNIFICANT CHANGE UP
OPIATES UR-MCNC: NEGATIVE — SIGNIFICANT CHANGE UP
OXYCODONE UR-MCNC: NEGATIVE — SIGNIFICANT CHANGE UP
OXYCODONE UR-MCNC: NEGATIVE — SIGNIFICANT CHANGE UP
PCP SPEC-MCNC: SIGNIFICANT CHANGE UP
PCP SPEC-MCNC: SIGNIFICANT CHANGE UP
PCP UR-MCNC: NEGATIVE — SIGNIFICANT CHANGE UP
PCP UR-MCNC: NEGATIVE — SIGNIFICANT CHANGE UP
PH UR: 5.5 — SIGNIFICANT CHANGE UP (ref 5–8)
PLATELET # BLD AUTO: 249 K/UL — SIGNIFICANT CHANGE UP (ref 150–400)
PMV BLD: 9.7 FL — SIGNIFICANT CHANGE UP (ref 7–13)
POTASSIUM SERPL-MCNC: 3.9 MMOL/L — SIGNIFICANT CHANGE UP (ref 3.5–5.3)
POTASSIUM SERPL-SCNC: 3.9 MMOL/L — SIGNIFICANT CHANGE UP (ref 3.5–5.3)
PROT SERPL-MCNC: 7.6 G/DL — SIGNIFICANT CHANGE UP (ref 6–8.3)
PROT UR-MCNC: 30 MG/DL
RBC # BLD: 4.92 M/UL — SIGNIFICANT CHANGE UP (ref 3.8–5.2)
RBC # FLD: 12.6 % — SIGNIFICANT CHANGE UP (ref 10.3–14.5)
RBC CASTS # UR COMP ASSIST: 1 /HPF — SIGNIFICANT CHANGE UP (ref 0–4)
REVIEW: SIGNIFICANT CHANGE UP
SALICYLATES SERPL-MCNC: <0.3 MG/DL — LOW (ref 15–30)
SODIUM SERPL-SCNC: 140 MMOL/L — SIGNIFICANT CHANGE UP (ref 135–145)
SP GR SPEC: 1.03 — HIGH (ref 1–1.03)
SQUAMOUS # UR AUTO: 2 /HPF — SIGNIFICANT CHANGE UP (ref 0–5)
THC UR QL: POSITIVE
THC UR QL: POSITIVE
TOXICOLOGY SCREEN, DRUGS OF ABUSE, SERUM RESULT: SIGNIFICANT CHANGE UP
TSH SERPL-MCNC: 1.05 UIU/ML — SIGNIFICANT CHANGE UP (ref 0.27–4.2)
UROBILINOGEN FLD QL: 1 MG/DL — SIGNIFICANT CHANGE UP (ref 0.2–1)
WBC # BLD: 10.56 K/UL — HIGH (ref 3.8–10.5)
WBC # FLD AUTO: 10.56 K/UL — HIGH (ref 3.8–10.5)
WBC UR QL: 2 /HPF — SIGNIFICANT CHANGE UP (ref 0–5)

## 2025-08-07 PROCEDURE — 99285 EMERGENCY DEPT VISIT HI MDM: CPT

## 2025-08-07 RX ORDER — LOSARTAN POTASSIUM 100 MG/1
100 TABLET, FILM COATED ORAL DAILY
Refills: 0 | Status: DISCONTINUED | OUTPATIENT
Start: 2025-08-07 | End: 2025-08-18

## 2025-08-07 RX ORDER — LORAZEPAM 4 MG/ML
1 VIAL (ML) INJECTION
Refills: 0 | Status: DISCONTINUED | OUTPATIENT
Start: 2025-08-07 | End: 2025-08-14

## 2025-08-07 RX ORDER — CARVEDILOL 3.12 MG/1
25 TABLET, FILM COATED ORAL EVERY 12 HOURS
Refills: 0 | Status: DISCONTINUED | OUTPATIENT
Start: 2025-08-07 | End: 2025-08-18

## 2025-08-07 RX ORDER — ARIPIPRAZOLE 2 MG/1
10 TABLET ORAL AT BEDTIME
Refills: 0 | Status: DISCONTINUED | OUTPATIENT
Start: 2025-08-07 | End: 2025-08-18

## 2025-08-07 RX ORDER — PAROXETINE HYDROCHLORIDE 20 MG/1
30 TABLET, FILM COATED ORAL DAILY
Refills: 0 | Status: DISCONTINUED | OUTPATIENT
Start: 2025-08-07 | End: 2025-08-08

## 2025-08-07 RX ORDER — LORAZEPAM 4 MG/ML
1 VIAL (ML) INJECTION
Refills: 0 | Status: DISCONTINUED | OUTPATIENT
Start: 2025-08-07 | End: 2025-08-07

## 2025-08-07 RX ORDER — LORAZEPAM 4 MG/ML
1 VIAL (ML) INJECTION ONCE
Refills: 0 | Status: DISCONTINUED | OUTPATIENT
Start: 2025-08-07 | End: 2025-08-07

## 2025-08-07 RX ORDER — HALOPERIDOL 10 MG/1
5 TABLET ORAL EVERY 6 HOURS
Refills: 0 | Status: DISCONTINUED | OUTPATIENT
Start: 2025-08-07 | End: 2025-08-11

## 2025-08-07 RX ORDER — LORAZEPAM 4 MG/ML
2 VIAL (ML) INJECTION EVERY 6 HOURS
Refills: 0 | Status: DISCONTINUED | OUTPATIENT
Start: 2025-08-07 | End: 2025-08-13

## 2025-08-07 RX ORDER — HALOPERIDOL 10 MG/1
5 TABLET ORAL ONCE
Refills: 0 | Status: DISCONTINUED | OUTPATIENT
Start: 2025-08-07 | End: 2025-08-11

## 2025-08-07 RX ORDER — LORAZEPAM 4 MG/ML
2 VIAL (ML) INJECTION ONCE
Refills: 0 | Status: DISCONTINUED | OUTPATIENT
Start: 2025-08-07 | End: 2025-08-11

## 2025-08-07 RX ADMIN — LOSARTAN POTASSIUM 100 MILLIGRAM(S): 100 TABLET, FILM COATED ORAL at 20:10

## 2025-08-07 RX ADMIN — CARVEDILOL 25 MILLIGRAM(S): 3.12 TABLET, FILM COATED ORAL at 20:10

## 2025-08-07 RX ADMIN — ARIPIPRAZOLE 10 MILLIGRAM(S): 2 TABLET ORAL at 20:10

## 2025-08-07 RX ADMIN — Medication 1 MILLIGRAM(S): at 15:29

## 2025-08-08 DIAGNOSIS — I10 ESSENTIAL (PRIMARY) HYPERTENSION: ICD-10-CM

## 2025-08-08 PROCEDURE — 99222 1ST HOSP IP/OBS MODERATE 55: CPT

## 2025-08-08 RX ORDER — FLUOXETINE HYDROCHLORIDE 20 MG/1
10 CAPSULE ORAL DAILY
Refills: 0 | Status: DISCONTINUED | OUTPATIENT
Start: 2025-08-08 | End: 2025-08-11

## 2025-08-08 RX ORDER — PAROXETINE HYDROCHLORIDE 20 MG/1
10 TABLET, FILM COATED ORAL ONCE
Refills: 0 | Status: COMPLETED | OUTPATIENT
Start: 2025-08-08 | End: 2025-08-08

## 2025-08-08 RX ORDER — B1/B2/B3/B5/B6/B12/VIT C/FOLIC 500-0.5 MG
1 TABLET ORAL DAILY
Refills: 0 | Status: DISCONTINUED | OUTPATIENT
Start: 2025-08-08 | End: 2025-08-18

## 2025-08-08 RX ORDER — PAROXETINE HYDROCHLORIDE 20 MG/1
10 TABLET, FILM COATED ORAL DAILY
Refills: 0 | Status: COMPLETED | OUTPATIENT
Start: 2025-08-08 | End: 2025-08-11

## 2025-08-08 RX ADMIN — CARVEDILOL 25 MILLIGRAM(S): 3.12 TABLET, FILM COATED ORAL at 09:30

## 2025-08-08 RX ADMIN — CARVEDILOL 25 MILLIGRAM(S): 3.12 TABLET, FILM COATED ORAL at 20:47

## 2025-08-08 RX ADMIN — PAROXETINE HYDROCHLORIDE 10 MILLIGRAM(S): 20 TABLET, FILM COATED ORAL at 13:42

## 2025-08-08 RX ADMIN — ARIPIPRAZOLE 10 MILLIGRAM(S): 2 TABLET ORAL at 20:47

## 2025-08-08 RX ADMIN — Medication 1 MILLIGRAM(S): at 13:43

## 2025-08-08 RX ADMIN — LOSARTAN POTASSIUM 100 MILLIGRAM(S): 100 TABLET, FILM COATED ORAL at 09:30

## 2025-08-09 LAB
A1C WITH ESTIMATED AVERAGE GLUCOSE RESULT: 5.2 % — SIGNIFICANT CHANGE UP (ref 4–5.6)
CHOLEST SERPL-MCNC: 168 MG/DL — SIGNIFICANT CHANGE UP
ESTIMATED AVERAGE GLUCOSE: 103 — SIGNIFICANT CHANGE UP
HDLC SERPL-MCNC: 56 MG/DL — SIGNIFICANT CHANGE UP
LDLC SERPL-MCNC: 74 MG/DL — SIGNIFICANT CHANGE UP
LIPID PNL WITH DIRECT LDL SERPL: 74 MG/DL — SIGNIFICANT CHANGE UP
NONHDLC SERPL-MCNC: 112 MG/DL — SIGNIFICANT CHANGE UP
TRIGL SERPL-MCNC: 234 MG/DL — HIGH

## 2025-08-09 PROCEDURE — 99232 SBSQ HOSP IP/OBS MODERATE 35: CPT

## 2025-08-09 RX ORDER — DIPHENHYDRAMINE HCL 12.5MG/5ML
25 ELIXIR ORAL EVERY 4 HOURS
Refills: 0 | Status: DISCONTINUED | OUTPATIENT
Start: 2025-08-09 | End: 2025-08-15

## 2025-08-09 RX ADMIN — ARIPIPRAZOLE 10 MILLIGRAM(S): 2 TABLET ORAL at 21:04

## 2025-08-09 RX ADMIN — Medication 2000 UNIT(S): at 08:19

## 2025-08-09 RX ADMIN — Medication 25 MILLIGRAM(S): at 07:57

## 2025-08-09 RX ADMIN — Medication 1 TABLET(S): at 08:19

## 2025-08-09 RX ADMIN — PAROXETINE HYDROCHLORIDE 10 MILLIGRAM(S): 20 TABLET, FILM COATED ORAL at 08:19

## 2025-08-09 RX ADMIN — LOSARTAN POTASSIUM 100 MILLIGRAM(S): 100 TABLET, FILM COATED ORAL at 08:19

## 2025-08-09 RX ADMIN — FLUOXETINE HYDROCHLORIDE 10 MILLIGRAM(S): 20 CAPSULE ORAL at 08:19

## 2025-08-09 RX ADMIN — CARVEDILOL 25 MILLIGRAM(S): 3.12 TABLET, FILM COATED ORAL at 08:19

## 2025-08-09 RX ADMIN — CARVEDILOL 25 MILLIGRAM(S): 3.12 TABLET, FILM COATED ORAL at 21:04

## 2025-08-10 PROCEDURE — 99232 SBSQ HOSP IP/OBS MODERATE 35: CPT

## 2025-08-10 RX ADMIN — PAROXETINE HYDROCHLORIDE 10 MILLIGRAM(S): 20 TABLET, FILM COATED ORAL at 08:27

## 2025-08-10 RX ADMIN — Medication 1 MILLIGRAM(S): at 20:12

## 2025-08-10 RX ADMIN — ARIPIPRAZOLE 10 MILLIGRAM(S): 2 TABLET ORAL at 20:12

## 2025-08-10 RX ADMIN — LOSARTAN POTASSIUM 100 MILLIGRAM(S): 100 TABLET, FILM COATED ORAL at 08:27

## 2025-08-10 RX ADMIN — FLUOXETINE HYDROCHLORIDE 10 MILLIGRAM(S): 20 CAPSULE ORAL at 08:27

## 2025-08-10 RX ADMIN — Medication 2000 UNIT(S): at 08:26

## 2025-08-10 RX ADMIN — Medication 1 TABLET(S): at 08:27

## 2025-08-10 RX ADMIN — CARVEDILOL 25 MILLIGRAM(S): 3.12 TABLET, FILM COATED ORAL at 20:12

## 2025-08-10 RX ADMIN — CARVEDILOL 25 MILLIGRAM(S): 3.12 TABLET, FILM COATED ORAL at 08:27

## 2025-08-11 PROCEDURE — 99232 SBSQ HOSP IP/OBS MODERATE 35: CPT

## 2025-08-11 RX ORDER — OLANZAPINE 10 MG/1
2.5 TABLET ORAL EVERY 8 HOURS
Refills: 0 | Status: DISCONTINUED | OUTPATIENT
Start: 2025-08-11 | End: 2025-08-18

## 2025-08-11 RX ORDER — OLANZAPINE 10 MG/1
2.5 TABLET ORAL ONCE
Refills: 0 | Status: DISCONTINUED | OUTPATIENT
Start: 2025-08-11 | End: 2025-08-18

## 2025-08-11 RX ORDER — FLUOXETINE HYDROCHLORIDE 20 MG/1
20 CAPSULE ORAL DAILY
Refills: 0 | Status: DISCONTINUED | OUTPATIENT
Start: 2025-08-11 | End: 2025-08-14

## 2025-08-11 RX ADMIN — LOSARTAN POTASSIUM 100 MILLIGRAM(S): 100 TABLET, FILM COATED ORAL at 08:53

## 2025-08-11 RX ADMIN — ARIPIPRAZOLE 10 MILLIGRAM(S): 2 TABLET ORAL at 21:26

## 2025-08-11 RX ADMIN — PAROXETINE HYDROCHLORIDE 10 MILLIGRAM(S): 20 TABLET, FILM COATED ORAL at 08:54

## 2025-08-11 RX ADMIN — CARVEDILOL 25 MILLIGRAM(S): 3.12 TABLET, FILM COATED ORAL at 21:26

## 2025-08-11 RX ADMIN — CARVEDILOL 25 MILLIGRAM(S): 3.12 TABLET, FILM COATED ORAL at 08:54

## 2025-08-11 RX ADMIN — Medication 2000 UNIT(S): at 08:54

## 2025-08-11 RX ADMIN — Medication 1 TABLET(S): at 08:53

## 2025-08-11 RX ADMIN — FLUOXETINE HYDROCHLORIDE 10 MILLIGRAM(S): 20 CAPSULE ORAL at 08:54

## 2025-08-12 PROCEDURE — 99232 SBSQ HOSP IP/OBS MODERATE 35: CPT

## 2025-08-12 RX ADMIN — LOSARTAN POTASSIUM 100 MILLIGRAM(S): 100 TABLET, FILM COATED ORAL at 08:17

## 2025-08-12 RX ADMIN — Medication 1 TABLET(S): at 08:18

## 2025-08-12 RX ADMIN — ARIPIPRAZOLE 10 MILLIGRAM(S): 2 TABLET ORAL at 20:48

## 2025-08-12 RX ADMIN — Medication 2000 UNIT(S): at 08:18

## 2025-08-12 RX ADMIN — FLUOXETINE HYDROCHLORIDE 20 MILLIGRAM(S): 20 CAPSULE ORAL at 08:17

## 2025-08-12 RX ADMIN — CARVEDILOL 25 MILLIGRAM(S): 3.12 TABLET, FILM COATED ORAL at 08:18

## 2025-08-12 RX ADMIN — CARVEDILOL 25 MILLIGRAM(S): 3.12 TABLET, FILM COATED ORAL at 20:49

## 2025-08-12 RX ADMIN — Medication 1 MILLIGRAM(S): at 16:30

## 2025-08-13 PROCEDURE — 99232 SBSQ HOSP IP/OBS MODERATE 35: CPT

## 2025-08-13 RX ORDER — MIRTAZAPINE 30 MG/1
7.5 TABLET, FILM COATED ORAL AT BEDTIME
Refills: 0 | Status: DISCONTINUED | OUTPATIENT
Start: 2025-08-13 | End: 2025-08-18

## 2025-08-13 RX ADMIN — LOSARTAN POTASSIUM 100 MILLIGRAM(S): 100 TABLET, FILM COATED ORAL at 09:18

## 2025-08-13 RX ADMIN — Medication 2000 UNIT(S): at 09:18

## 2025-08-13 RX ADMIN — FLUOXETINE HYDROCHLORIDE 20 MILLIGRAM(S): 20 CAPSULE ORAL at 09:18

## 2025-08-13 RX ADMIN — CARVEDILOL 25 MILLIGRAM(S): 3.12 TABLET, FILM COATED ORAL at 20:14

## 2025-08-13 RX ADMIN — Medication 1 TABLET(S): at 09:18

## 2025-08-13 RX ADMIN — CARVEDILOL 25 MILLIGRAM(S): 3.12 TABLET, FILM COATED ORAL at 09:18

## 2025-08-13 RX ADMIN — ARIPIPRAZOLE 10 MILLIGRAM(S): 2 TABLET ORAL at 20:13

## 2025-08-14 PROBLEM — F32.9 MAJOR DEPRESSIVE DISORDER, SINGLE EPISODE, UNSPECIFIED: Chronic | Status: ACTIVE | Noted: 2025-08-07

## 2025-08-14 PROCEDURE — 99232 SBSQ HOSP IP/OBS MODERATE 35: CPT

## 2025-08-14 RX ORDER — FLUOXETINE HYDROCHLORIDE 20 MG/1
30 CAPSULE ORAL DAILY
Refills: 0 | Status: DISCONTINUED | OUTPATIENT
Start: 2025-08-14 | End: 2025-08-18

## 2025-08-14 RX ORDER — LORAZEPAM 4 MG/ML
1 VIAL (ML) INJECTION
Refills: 0 | Status: DISCONTINUED | OUTPATIENT
Start: 2025-08-14 | End: 2025-08-18

## 2025-08-14 RX ORDER — LOPERAMIDE HCL 1 MG/7.5ML
2 SOLUTION ORAL DAILY
Refills: 0 | Status: DISCONTINUED | OUTPATIENT
Start: 2025-08-14 | End: 2025-08-18

## 2025-08-14 RX ADMIN — Medication 25 MILLIGRAM(S): at 18:08

## 2025-08-14 RX ADMIN — CARVEDILOL 25 MILLIGRAM(S): 3.12 TABLET, FILM COATED ORAL at 20:33

## 2025-08-14 RX ADMIN — Medication 2000 UNIT(S): at 08:46

## 2025-08-14 RX ADMIN — CARVEDILOL 25 MILLIGRAM(S): 3.12 TABLET, FILM COATED ORAL at 08:46

## 2025-08-14 RX ADMIN — LOSARTAN POTASSIUM 100 MILLIGRAM(S): 100 TABLET, FILM COATED ORAL at 08:46

## 2025-08-14 RX ADMIN — FLUOXETINE HYDROCHLORIDE 20 MILLIGRAM(S): 20 CAPSULE ORAL at 08:46

## 2025-08-14 RX ADMIN — Medication 1 TABLET(S): at 08:46

## 2025-08-14 RX ADMIN — ARIPIPRAZOLE 10 MILLIGRAM(S): 2 TABLET ORAL at 20:33

## 2025-08-15 PROCEDURE — 99232 SBSQ HOSP IP/OBS MODERATE 35: CPT

## 2025-08-15 RX ORDER — LOPERAMIDE HCL 1 MG/7.5ML
2 SOLUTION ORAL
Refills: 0 | Status: DISCONTINUED | OUTPATIENT
Start: 2025-08-15 | End: 2025-08-18

## 2025-08-15 RX ADMIN — LOSARTAN POTASSIUM 100 MILLIGRAM(S): 100 TABLET, FILM COATED ORAL at 09:33

## 2025-08-15 RX ADMIN — CARVEDILOL 25 MILLIGRAM(S): 3.12 TABLET, FILM COATED ORAL at 09:33

## 2025-08-15 RX ADMIN — Medication 1 TABLET(S): at 09:34

## 2025-08-15 RX ADMIN — ARIPIPRAZOLE 10 MILLIGRAM(S): 2 TABLET ORAL at 20:39

## 2025-08-15 RX ADMIN — FLUOXETINE HYDROCHLORIDE 30 MILLIGRAM(S): 20 CAPSULE ORAL at 09:33

## 2025-08-15 RX ADMIN — CARVEDILOL 25 MILLIGRAM(S): 3.12 TABLET, FILM COATED ORAL at 20:39

## 2025-08-15 RX ADMIN — Medication 25 MILLIGRAM(S): at 05:12

## 2025-08-15 RX ADMIN — Medication 2000 UNIT(S): at 09:33

## 2025-08-16 RX ADMIN — CARVEDILOL 25 MILLIGRAM(S): 3.12 TABLET, FILM COATED ORAL at 09:27

## 2025-08-16 RX ADMIN — LOSARTAN POTASSIUM 100 MILLIGRAM(S): 100 TABLET, FILM COATED ORAL at 09:27

## 2025-08-16 RX ADMIN — FLUOXETINE HYDROCHLORIDE 30 MILLIGRAM(S): 20 CAPSULE ORAL at 09:27

## 2025-08-16 RX ADMIN — ARIPIPRAZOLE 10 MILLIGRAM(S): 2 TABLET ORAL at 20:43

## 2025-08-16 RX ADMIN — CARVEDILOL 25 MILLIGRAM(S): 3.12 TABLET, FILM COATED ORAL at 20:44

## 2025-08-16 RX ADMIN — Medication 2000 UNIT(S): at 09:26

## 2025-08-16 RX ADMIN — Medication 1 TABLET(S): at 09:27

## 2025-08-17 RX ADMIN — CARVEDILOL 25 MILLIGRAM(S): 3.12 TABLET, FILM COATED ORAL at 08:47

## 2025-08-17 RX ADMIN — Medication 1 TABLET(S): at 08:47

## 2025-08-17 RX ADMIN — CARVEDILOL 25 MILLIGRAM(S): 3.12 TABLET, FILM COATED ORAL at 20:39

## 2025-08-17 RX ADMIN — LOSARTAN POTASSIUM 100 MILLIGRAM(S): 100 TABLET, FILM COATED ORAL at 08:47

## 2025-08-17 RX ADMIN — Medication 2000 UNIT(S): at 08:47

## 2025-08-17 RX ADMIN — FLUOXETINE HYDROCHLORIDE 30 MILLIGRAM(S): 20 CAPSULE ORAL at 08:47

## 2025-08-17 RX ADMIN — Medication 1 MILLIGRAM(S): at 13:09

## 2025-08-17 RX ADMIN — ARIPIPRAZOLE 10 MILLIGRAM(S): 2 TABLET ORAL at 20:39

## 2025-08-18 VITALS — OXYGEN SATURATION: 98 % | TEMPERATURE: 98 F | RESPIRATION RATE: 18 BRPM

## 2025-08-18 RX ORDER — LORAZEPAM 4 MG/ML
1 VIAL (ML) INJECTION
Qty: 0 | Refills: 0 | DISCHARGE
Start: 2025-08-18

## 2025-08-18 RX ORDER — ARIPIPRAZOLE 2 MG/1
1 TABLET ORAL
Qty: 15 | Refills: 0
Start: 2025-08-18 | End: 2025-09-01

## 2025-08-18 RX ORDER — FLUOXETINE HYDROCHLORIDE 20 MG/1
1 CAPSULE ORAL
Qty: 15 | Refills: 0
Start: 2025-08-18 | End: 2025-09-01

## 2025-08-18 RX ADMIN — Medication 2000 UNIT(S): at 09:17

## 2025-08-18 RX ADMIN — Medication 10 MILLIGRAM(S): at 10:15

## 2025-08-18 RX ADMIN — LOPERAMIDE HCL 2 MILLIGRAM(S): 1 SOLUTION ORAL at 09:17

## 2025-08-18 RX ADMIN — FLUOXETINE HYDROCHLORIDE 30 MILLIGRAM(S): 20 CAPSULE ORAL at 09:18

## 2025-08-18 RX ADMIN — Medication 1 TABLET(S): at 09:17

## 2025-08-18 RX ADMIN — CARVEDILOL 25 MILLIGRAM(S): 3.12 TABLET, FILM COATED ORAL at 09:17

## 2025-08-18 RX ADMIN — LOSARTAN POTASSIUM 100 MILLIGRAM(S): 100 TABLET, FILM COATED ORAL at 09:18

## 2025-08-20 ENCOUNTER — OUTPATIENT (OUTPATIENT)
Dept: OUTPATIENT SERVICES | Facility: HOSPITAL | Age: 65
LOS: 1 days | Discharge: HOME | End: 2025-08-20
Payer: MEDICARE

## 2025-08-20 DIAGNOSIS — F33.9 MAJOR DEPRESSIVE DISORDER, RECURRENT, UNSPECIFIED: ICD-10-CM

## 2025-08-20 PROCEDURE — 90792 PSYCH DIAG EVAL W/MED SRVCS: CPT

## 2025-08-21 ENCOUNTER — INPATIENT (INPATIENT)
Facility: HOSPITAL | Age: 65
LOS: 5 days | Discharge: HOME | End: 2025-08-27
Attending: PSYCHIATRY & NEUROLOGY | Admitting: PSYCHIATRY & NEUROLOGY
Payer: MEDICARE

## 2025-08-21 VITALS
OXYGEN SATURATION: 98 % | WEIGHT: 154.1 LBS | HEART RATE: 70 BPM | RESPIRATION RATE: 18 BRPM | SYSTOLIC BLOOD PRESSURE: 150 MMHG | HEIGHT: 59 IN | DIASTOLIC BLOOD PRESSURE: 90 MMHG | TEMPERATURE: 98 F

## 2025-08-21 DIAGNOSIS — F32.9 MAJOR DEPRESSIVE DISORDER, SINGLE EPISODE, UNSPECIFIED: ICD-10-CM

## 2025-08-21 DIAGNOSIS — F33.3 MAJOR DEPRESSIVE DISORDER, RECURRENT, SEVERE WITH PSYCHOTIC SYMPTOMS: ICD-10-CM

## 2025-08-21 LAB
ALBUMIN SERPL ELPH-MCNC: 4.5 G/DL — SIGNIFICANT CHANGE UP (ref 3.3–5)
ALP SERPL-CCNC: 59 U/L — SIGNIFICANT CHANGE UP (ref 40–120)
ALT FLD-CCNC: 21 U/L — SIGNIFICANT CHANGE UP (ref 4–33)
AMPHET UR-MCNC: NEGATIVE — SIGNIFICANT CHANGE UP
ANION GAP SERPL CALC-SCNC: 19 MMOL/L — HIGH (ref 7–14)
APAP SERPL-MCNC: <10 UG/ML — LOW (ref 15–25)
APPEARANCE UR: CLEAR — SIGNIFICANT CHANGE UP
AST SERPL-CCNC: 23 U/L — SIGNIFICANT CHANGE UP (ref 4–32)
BARBITURATES UR SCN-MCNC: NEGATIVE — SIGNIFICANT CHANGE UP
BASOPHILS # BLD AUTO: 0.03 K/UL — SIGNIFICANT CHANGE UP (ref 0–0.2)
BASOPHILS NFR BLD AUTO: 0.3 % — SIGNIFICANT CHANGE UP (ref 0–2)
BENZODIAZ UR-MCNC: NEGATIVE — SIGNIFICANT CHANGE UP
BILIRUB SERPL-MCNC: 0.8 MG/DL — SIGNIFICANT CHANGE UP (ref 0.2–1.2)
BILIRUB UR-MCNC: NEGATIVE — SIGNIFICANT CHANGE UP
BUN SERPL-MCNC: 14 MG/DL — SIGNIFICANT CHANGE UP (ref 7–23)
CALCIUM SERPL-MCNC: 10.5 MG/DL — SIGNIFICANT CHANGE UP (ref 8.4–10.5)
CHLORIDE SERPL-SCNC: 101 MMOL/L — SIGNIFICANT CHANGE UP (ref 98–107)
CO2 SERPL-SCNC: 20 MMOL/L — LOW (ref 22–31)
COCAINE METAB.OTHER UR-MCNC: NEGATIVE — SIGNIFICANT CHANGE UP
COLOR SPEC: SIGNIFICANT CHANGE UP
CREAT SERPL-MCNC: 0.78 MG/DL — SIGNIFICANT CHANGE UP (ref 0.5–1.3)
CREATININE URINE RESULT, DAU: 83 MG/DL — SIGNIFICANT CHANGE UP
DIFF PNL FLD: NEGATIVE — SIGNIFICANT CHANGE UP
EGFR: 84 ML/MIN/1.73M2 — SIGNIFICANT CHANGE UP
EGFR: 84 ML/MIN/1.73M2 — SIGNIFICANT CHANGE UP
EOSINOPHIL # BLD AUTO: 0 K/UL — SIGNIFICANT CHANGE UP (ref 0–0.5)
EOSINOPHIL NFR BLD AUTO: 0 % — SIGNIFICANT CHANGE UP (ref 0–6)
ETHANOL SERPL-MCNC: <10 MG/DL — SIGNIFICANT CHANGE UP
FENTANYL UR QL SCN: NEGATIVE — SIGNIFICANT CHANGE UP
GLUCOSE SERPL-MCNC: 103 MG/DL — HIGH (ref 70–99)
GLUCOSE UR QL: NEGATIVE MG/DL — SIGNIFICANT CHANGE UP
HCT VFR BLD CALC: 45.8 % — HIGH (ref 34.5–45)
HGB BLD-MCNC: 15.8 G/DL — HIGH (ref 11.5–15.5)
IMM GRANULOCYTES # BLD AUTO: 0.03 K/UL — SIGNIFICANT CHANGE UP (ref 0–0.07)
IMM GRANULOCYTES NFR BLD AUTO: 0.3 % — SIGNIFICANT CHANGE UP (ref 0–0.9)
KETONES UR QL: NEGATIVE MG/DL — SIGNIFICANT CHANGE UP
LEUKOCYTE ESTERASE UR-ACNC: NEGATIVE — SIGNIFICANT CHANGE UP
LYMPHOCYTES # BLD AUTO: 1.16 K/UL — SIGNIFICANT CHANGE UP (ref 1–3.3)
LYMPHOCYTES NFR BLD AUTO: 11.1 % — LOW (ref 13–44)
MCHC RBC-ENTMCNC: 29.5 PG — SIGNIFICANT CHANGE UP (ref 27–34)
MCHC RBC-ENTMCNC: 34.5 G/DL — SIGNIFICANT CHANGE UP (ref 32–36)
MCV RBC AUTO: 85.6 FL — SIGNIFICANT CHANGE UP (ref 80–100)
METHADONE UR-MCNC: NEGATIVE — SIGNIFICANT CHANGE UP
MONOCYTES # BLD AUTO: 0.55 K/UL — SIGNIFICANT CHANGE UP (ref 0–0.9)
MONOCYTES NFR BLD AUTO: 5.3 % — SIGNIFICANT CHANGE UP (ref 2–14)
NEUTROPHILS # BLD AUTO: 8.67 K/UL — HIGH (ref 1.8–7.4)
NEUTROPHILS NFR BLD AUTO: 83 % — HIGH (ref 43–77)
NITRITE UR-MCNC: NEGATIVE — SIGNIFICANT CHANGE UP
NRBC # BLD AUTO: 0 K/UL — SIGNIFICANT CHANGE UP (ref 0–0)
NRBC # FLD: 0 K/UL — SIGNIFICANT CHANGE UP (ref 0–0)
NRBC BLD AUTO-RTO: 0 /100 WBCS — SIGNIFICANT CHANGE UP (ref 0–0)
OPIATES UR-MCNC: NEGATIVE — SIGNIFICANT CHANGE UP
OXYCODONE UR-MCNC: NEGATIVE — SIGNIFICANT CHANGE UP
PCP SPEC-MCNC: SIGNIFICANT CHANGE UP
PCP UR-MCNC: NEGATIVE — SIGNIFICANT CHANGE UP
PH UR: 5.5 — SIGNIFICANT CHANGE UP (ref 5–8)
PLATELET # BLD AUTO: 214 K/UL — SIGNIFICANT CHANGE UP (ref 150–400)
PMV BLD: 10.2 FL — SIGNIFICANT CHANGE UP (ref 7–13)
POTASSIUM SERPL-MCNC: 3.7 MMOL/L — SIGNIFICANT CHANGE UP (ref 3.5–5.3)
POTASSIUM SERPL-SCNC: 3.7 MMOL/L — SIGNIFICANT CHANGE UP (ref 3.5–5.3)
PROT SERPL-MCNC: 7.9 G/DL — SIGNIFICANT CHANGE UP (ref 6–8.3)
PROT UR-MCNC: NEGATIVE MG/DL — SIGNIFICANT CHANGE UP
RBC # BLD: 5.35 M/UL — HIGH (ref 3.8–5.2)
RBC # FLD: 12.8 % — SIGNIFICANT CHANGE UP (ref 10.3–14.5)
SALICYLATES SERPL-MCNC: <0.3 MG/DL — LOW (ref 15–30)
SODIUM SERPL-SCNC: 140 MMOL/L — SIGNIFICANT CHANGE UP (ref 135–145)
SP GR SPEC: 1.02 — SIGNIFICANT CHANGE UP (ref 1–1.03)
THC UR QL: NEGATIVE — SIGNIFICANT CHANGE UP
TOXICOLOGY SCREEN, DRUGS OF ABUSE, SERUM RESULT: SIGNIFICANT CHANGE UP
TSH SERPL-MCNC: 1.37 UIU/ML — SIGNIFICANT CHANGE UP (ref 0.27–4.2)
UROBILINOGEN FLD QL: 0.2 MG/DL — SIGNIFICANT CHANGE UP (ref 0.2–1)
WBC # BLD: 10.44 K/UL — SIGNIFICANT CHANGE UP (ref 3.8–10.5)
WBC # FLD AUTO: 10.44 K/UL — SIGNIFICANT CHANGE UP (ref 3.8–10.5)

## 2025-08-21 PROCEDURE — 99285 EMERGENCY DEPT VISIT HI MDM: CPT

## 2025-08-21 RX ORDER — LOSARTAN POTASSIUM 100 MG/1
100 TABLET, FILM COATED ORAL DAILY
Refills: 0 | Status: DISCONTINUED | OUTPATIENT
Start: 2025-08-21 | End: 2025-08-27

## 2025-08-21 RX ORDER — OLANZAPINE 10 MG/1
2.5 TABLET ORAL ONCE
Refills: 0 | Status: DISCONTINUED | OUTPATIENT
Start: 2025-08-21 | End: 2025-08-27

## 2025-08-21 RX ORDER — FLUOXETINE HYDROCHLORIDE 20 MG/1
40 CAPSULE ORAL DAILY
Refills: 0 | Status: DISCONTINUED | OUTPATIENT
Start: 2025-08-21 | End: 2025-08-27

## 2025-08-21 RX ORDER — MIRTAZAPINE 30 MG/1
7.5 TABLET, FILM COATED ORAL AT BEDTIME
Refills: 0 | Status: DISCONTINUED | OUTPATIENT
Start: 2025-08-21 | End: 2025-08-27

## 2025-08-21 RX ORDER — CARVEDILOL 3.12 MG/1
25 TABLET, FILM COATED ORAL EVERY 12 HOURS
Refills: 0 | Status: DISCONTINUED | OUTPATIENT
Start: 2025-08-21 | End: 2025-08-27

## 2025-08-21 RX ORDER — ARIPIPRAZOLE 2 MG/1
10 TABLET ORAL DAILY
Refills: 0 | Status: DISCONTINUED | OUTPATIENT
Start: 2025-08-21 | End: 2025-08-24

## 2025-08-21 RX ORDER — LORAZEPAM 4 MG/ML
1 VIAL (ML) INJECTION EVERY 12 HOURS
Refills: 0 | Status: DISCONTINUED | OUTPATIENT
Start: 2025-08-21 | End: 2025-08-27

## 2025-08-21 RX ORDER — CARVEDILOL 3.12 MG/1
25 TABLET, FILM COATED ORAL
Refills: 0 | Status: DISCONTINUED | OUTPATIENT
Start: 2025-08-21 | End: 2025-08-21

## 2025-08-21 RX ORDER — OLANZAPINE 10 MG/1
2.5 TABLET ORAL EVERY 4 HOURS
Refills: 0 | Status: DISCONTINUED | OUTPATIENT
Start: 2025-08-21 | End: 2025-08-27

## 2025-08-21 RX ADMIN — CARVEDILOL 25 MILLIGRAM(S): 3.12 TABLET, FILM COATED ORAL at 20:42

## 2025-08-22 DIAGNOSIS — I10 ESSENTIAL (PRIMARY) HYPERTENSION: ICD-10-CM

## 2025-08-22 PROCEDURE — 99222 1ST HOSP IP/OBS MODERATE 55: CPT

## 2025-08-22 PROCEDURE — 90853 GROUP PSYCHOTHERAPY: CPT

## 2025-08-22 RX ADMIN — LOSARTAN POTASSIUM 100 MILLIGRAM(S): 100 TABLET, FILM COATED ORAL at 09:17

## 2025-08-22 RX ADMIN — CARVEDILOL 25 MILLIGRAM(S): 3.12 TABLET, FILM COATED ORAL at 09:17

## 2025-08-22 RX ADMIN — CARVEDILOL 25 MILLIGRAM(S): 3.12 TABLET, FILM COATED ORAL at 20:46

## 2025-08-22 RX ADMIN — FLUOXETINE HYDROCHLORIDE 40 MILLIGRAM(S): 20 CAPSULE ORAL at 09:18

## 2025-08-23 PROCEDURE — 99232 SBSQ HOSP IP/OBS MODERATE 35: CPT

## 2025-08-23 RX ORDER — MELATONIN 5 MG
5 TABLET ORAL AT BEDTIME
Refills: 0 | Status: DISCONTINUED | OUTPATIENT
Start: 2025-08-23 | End: 2025-08-27

## 2025-08-23 RX ADMIN — CARVEDILOL 25 MILLIGRAM(S): 3.12 TABLET, FILM COATED ORAL at 20:30

## 2025-08-23 RX ADMIN — LOSARTAN POTASSIUM 100 MILLIGRAM(S): 100 TABLET, FILM COATED ORAL at 08:12

## 2025-08-23 RX ADMIN — ARIPIPRAZOLE 10 MILLIGRAM(S): 2 TABLET ORAL at 08:12

## 2025-08-23 RX ADMIN — Medication 5 MILLIGRAM(S): at 20:31

## 2025-08-23 RX ADMIN — CARVEDILOL 25 MILLIGRAM(S): 3.12 TABLET, FILM COATED ORAL at 08:12

## 2025-08-23 RX ADMIN — FLUOXETINE HYDROCHLORIDE 40 MILLIGRAM(S): 20 CAPSULE ORAL at 08:12

## 2025-08-24 PROCEDURE — 99232 SBSQ HOSP IP/OBS MODERATE 35: CPT

## 2025-08-24 RX ORDER — ARIPIPRAZOLE 2 MG/1
10 TABLET ORAL AT BEDTIME
Refills: 0 | Status: DISCONTINUED | OUTPATIENT
Start: 2025-08-24 | End: 2025-08-27

## 2025-08-24 RX ADMIN — ARIPIPRAZOLE 10 MILLIGRAM(S): 2 TABLET ORAL at 20:38

## 2025-08-24 RX ADMIN — Medication 5 MILLIGRAM(S): at 20:39

## 2025-08-24 RX ADMIN — FLUOXETINE HYDROCHLORIDE 40 MILLIGRAM(S): 20 CAPSULE ORAL at 08:16

## 2025-08-24 RX ADMIN — CARVEDILOL 25 MILLIGRAM(S): 3.12 TABLET, FILM COATED ORAL at 20:38

## 2025-08-24 RX ADMIN — LOSARTAN POTASSIUM 100 MILLIGRAM(S): 100 TABLET, FILM COATED ORAL at 08:16

## 2025-08-24 RX ADMIN — CARVEDILOL 25 MILLIGRAM(S): 3.12 TABLET, FILM COATED ORAL at 08:17

## 2025-08-24 RX ADMIN — Medication 1 MILLIGRAM(S): at 16:54

## 2025-08-25 PROCEDURE — 99232 SBSQ HOSP IP/OBS MODERATE 35: CPT

## 2025-08-25 RX ORDER — DIPHENHYDRAMINE HCL 12.5MG/5ML
50 ELIXIR ORAL EVERY 6 HOURS
Refills: 0 | Status: DISCONTINUED | OUTPATIENT
Start: 2025-08-25 | End: 2025-08-27

## 2025-08-25 RX ADMIN — CARVEDILOL 25 MILLIGRAM(S): 3.12 TABLET, FILM COATED ORAL at 20:27

## 2025-08-25 RX ADMIN — FLUOXETINE HYDROCHLORIDE 40 MILLIGRAM(S): 20 CAPSULE ORAL at 08:38

## 2025-08-25 RX ADMIN — Medication 50 MILLIGRAM(S): at 21:27

## 2025-08-25 RX ADMIN — LOSARTAN POTASSIUM 100 MILLIGRAM(S): 100 TABLET, FILM COATED ORAL at 08:38

## 2025-08-25 RX ADMIN — ARIPIPRAZOLE 10 MILLIGRAM(S): 2 TABLET ORAL at 20:28

## 2025-08-25 RX ADMIN — CARVEDILOL 25 MILLIGRAM(S): 3.12 TABLET, FILM COATED ORAL at 08:38

## 2025-08-25 RX ADMIN — Medication 50 MILLIGRAM(S): at 13:23

## 2025-08-26 PROCEDURE — 99232 SBSQ HOSP IP/OBS MODERATE 35: CPT

## 2025-08-26 RX ORDER — ACETAMINOPHEN 500 MG/5ML
650 LIQUID (ML) ORAL EVERY 6 HOURS
Refills: 0 | Status: DISCONTINUED | OUTPATIENT
Start: 2025-08-26 | End: 2025-08-27

## 2025-08-26 RX ORDER — ACETAMINOPHEN 500 MG/5ML
650 LIQUID (ML) ORAL EVERY 6 HOURS
Refills: 0 | Status: DISCONTINUED | OUTPATIENT
Start: 2025-08-26 | End: 2025-08-26

## 2025-08-26 RX ADMIN — LOSARTAN POTASSIUM 100 MILLIGRAM(S): 100 TABLET, FILM COATED ORAL at 08:05

## 2025-08-26 RX ADMIN — CARVEDILOL 25 MILLIGRAM(S): 3.12 TABLET, FILM COATED ORAL at 08:05

## 2025-08-26 RX ADMIN — FLUOXETINE HYDROCHLORIDE 40 MILLIGRAM(S): 20 CAPSULE ORAL at 08:05

## 2025-08-26 RX ADMIN — CARVEDILOL 25 MILLIGRAM(S): 3.12 TABLET, FILM COATED ORAL at 20:30

## 2025-08-26 RX ADMIN — Medication 5 MILLIGRAM(S): at 20:30

## 2025-08-26 RX ADMIN — ARIPIPRAZOLE 10 MILLIGRAM(S): 2 TABLET ORAL at 20:30

## 2025-08-26 RX ADMIN — Medication 1 MILLIGRAM(S): at 14:16

## 2025-08-27 VITALS — TEMPERATURE: 98 F

## 2025-08-27 PROCEDURE — 99238 HOSP IP/OBS DSCHRG MGMT 30/<: CPT

## 2025-08-27 PROCEDURE — 90853 GROUP PSYCHOTHERAPY: CPT

## 2025-08-27 RX ORDER — LOSARTAN POTASSIUM 100 MG/1
1 TABLET, FILM COATED ORAL
Qty: 0 | Refills: 0 | DISCHARGE
Start: 2025-08-27

## 2025-08-27 RX ORDER — MIRTAZAPINE 30 MG/1
1 TABLET, FILM COATED ORAL
Qty: 14 | Refills: 0
Start: 2025-08-27 | End: 2025-09-09

## 2025-08-27 RX ORDER — FLUOXETINE HYDROCHLORIDE 20 MG/1
1 CAPSULE ORAL
Qty: 14 | Refills: 0
Start: 2025-08-27 | End: 2025-09-09

## 2025-08-27 RX ORDER — ARIPIPRAZOLE 2 MG/1
1 TABLET ORAL
Qty: 14 | Refills: 0
Start: 2025-08-27 | End: 2025-09-09

## 2025-08-27 RX ADMIN — LOSARTAN POTASSIUM 100 MILLIGRAM(S): 100 TABLET, FILM COATED ORAL at 09:01

## 2025-08-27 RX ADMIN — FLUOXETINE HYDROCHLORIDE 40 MILLIGRAM(S): 20 CAPSULE ORAL at 09:00

## 2025-08-27 RX ADMIN — CARVEDILOL 25 MILLIGRAM(S): 3.12 TABLET, FILM COATED ORAL at 08:59
